# Patient Record
Sex: FEMALE | Race: WHITE | Employment: OTHER | URBAN - METROPOLITAN AREA
[De-identification: names, ages, dates, MRNs, and addresses within clinical notes are randomized per-mention and may not be internally consistent; named-entity substitution may affect disease eponyms.]

---

## 2017-09-23 PROBLEM — R07.9 CHEST PAIN: Status: ACTIVE | Noted: 2017-09-23

## 2017-09-25 PROBLEM — R07.2 PRECORDIAL PAIN: Status: ACTIVE | Noted: 2017-09-23

## 2018-05-02 ENCOUNTER — HOSPITAL ENCOUNTER (OUTPATIENT)
Age: 83
Setting detail: OBSERVATION
Discharge: HOME | End: 2018-05-04
Attending: EMERGENCY MEDICINE | Admitting: INTERNAL MEDICINE
Payer: MEDICARE

## 2018-05-02 ENCOUNTER — APPOINTMENT (OUTPATIENT)
Dept: CT IMAGING | Age: 83
End: 2018-05-02
Payer: MEDICARE

## 2018-05-02 ENCOUNTER — APPOINTMENT (OUTPATIENT)
Dept: GENERAL RADIOLOGY | Age: 83
End: 2018-05-02
Payer: MEDICARE

## 2018-05-02 DIAGNOSIS — R29.90 STROKE-LIKE SYMPTOMS: ICD-10-CM

## 2018-05-02 DIAGNOSIS — H81.4 VERTIGO OF CENTRAL ORIGIN, UNSPECIFIED LATERALITY: Primary | ICD-10-CM

## 2018-05-02 LAB
ANION GAP SERPL CALCULATED.3IONS-SCNC: 13 MMOL/L (ref 7–16)
BASOPHILS ABSOLUTE: 0.05 E9/L (ref 0–0.2)
BASOPHILS RELATIVE PERCENT: 1 % (ref 0–2)
BUN BLDV-MCNC: 15 MG/DL (ref 8–23)
CALCIUM SERPL-MCNC: 9.5 MG/DL (ref 8.6–10.2)
CHLORIDE BLD-SCNC: 99 MMOL/L (ref 98–107)
CO2: 27 MMOL/L (ref 22–29)
CREAT SERPL-MCNC: 0.9 MG/DL (ref 0.5–1)
EOSINOPHILS ABSOLUTE: 0.11 E9/L (ref 0.05–0.5)
EOSINOPHILS RELATIVE PERCENT: 2.1 % (ref 0–6)
GFR AFRICAN AMERICAN: >60
GFR NON-AFRICAN AMERICAN: 59 ML/MIN/1.73
GLUCOSE BLD-MCNC: 105 MG/DL (ref 74–109)
HCT VFR BLD CALC: 40.6 % (ref 34–48)
HEMOGLOBIN: 13.4 G/DL (ref 11.5–15.5)
IMMATURE GRANULOCYTES #: 0.01 E9/L
IMMATURE GRANULOCYTES %: 0.2 % (ref 0–5)
LYMPHOCYTES ABSOLUTE: 1.39 E9/L (ref 1.5–4)
LYMPHOCYTES RELATIVE PERCENT: 26.6 % (ref 20–42)
MCH RBC QN AUTO: 30.5 PG (ref 26–35)
MCHC RBC AUTO-ENTMCNC: 33 % (ref 32–34.5)
MCV RBC AUTO: 92.5 FL (ref 80–99.9)
METER GLUCOSE: 95 MG/DL (ref 70–110)
MONOCYTES ABSOLUTE: 0.34 E9/L (ref 0.1–0.95)
MONOCYTES RELATIVE PERCENT: 6.5 % (ref 2–12)
NEUTROPHILS ABSOLUTE: 3.32 E9/L (ref 1.8–7.3)
NEUTROPHILS RELATIVE PERCENT: 63.6 % (ref 43–80)
PDW BLD-RTO: 12.8 FL (ref 11.5–15)
PLATELET # BLD: 201 E9/L (ref 130–450)
PMV BLD AUTO: 9.6 FL (ref 7–12)
POTASSIUM SERPL-SCNC: 5 MMOL/L (ref 3.5–5)
RBC # BLD: 4.39 E12/L (ref 3.5–5.5)
SODIUM BLD-SCNC: 139 MMOL/L (ref 132–146)
TROPONIN: <0.01 NG/ML (ref 0–0.03)
WBC # BLD: 5.2 E9/L (ref 4.5–11.5)

## 2018-05-02 PROCEDURE — 93005 ELECTROCARDIOGRAM TRACING: CPT

## 2018-05-02 PROCEDURE — 85025 COMPLETE CBC W/AUTO DIFF WBC: CPT

## 2018-05-02 PROCEDURE — 2580000003 HC RX 258: Performed by: INTERNAL MEDICINE

## 2018-05-02 PROCEDURE — 36415 COLL VENOUS BLD VENIPUNCTURE: CPT

## 2018-05-02 PROCEDURE — 6370000000 HC RX 637 (ALT 250 FOR IP): Performed by: EMERGENCY MEDICINE

## 2018-05-02 PROCEDURE — G0378 HOSPITAL OBSERVATION PER HR: HCPCS

## 2018-05-02 PROCEDURE — 82962 GLUCOSE BLOOD TEST: CPT

## 2018-05-02 PROCEDURE — 6360000002 HC RX W HCPCS: Performed by: INTERNAL MEDICINE

## 2018-05-02 PROCEDURE — 96372 THER/PROPH/DIAG INJ SC/IM: CPT

## 2018-05-02 PROCEDURE — 84484 ASSAY OF TROPONIN QUANT: CPT

## 2018-05-02 PROCEDURE — 71045 X-RAY EXAM CHEST 1 VIEW: CPT

## 2018-05-02 PROCEDURE — 80048 BASIC METABOLIC PNL TOTAL CA: CPT

## 2018-05-02 PROCEDURE — 70450 CT HEAD/BRAIN W/O DYE: CPT

## 2018-05-02 PROCEDURE — 99285 EMERGENCY DEPT VISIT HI MDM: CPT

## 2018-05-02 RX ORDER — ACETAMINOPHEN 325 MG/1
650 TABLET ORAL EVERY 4 HOURS PRN
Status: DISCONTINUED | OUTPATIENT
Start: 2018-05-02 | End: 2018-05-04 | Stop reason: HOSPADM

## 2018-05-02 RX ORDER — SODIUM CHLORIDE 0.9 % (FLUSH) 0.9 %
10 SYRINGE (ML) INJECTION PRN
Status: DISCONTINUED | OUTPATIENT
Start: 2018-05-02 | End: 2018-05-04 | Stop reason: HOSPADM

## 2018-05-02 RX ORDER — LEVOTHYROXINE SODIUM 0.05 MG/1
50 TABLET ORAL DAILY
Status: DISCONTINUED | OUTPATIENT
Start: 2018-05-03 | End: 2018-05-04 | Stop reason: HOSPADM

## 2018-05-02 RX ORDER — SODIUM CHLORIDE 0.9 % (FLUSH) 0.9 %
10 SYRINGE (ML) INJECTION EVERY 12 HOURS SCHEDULED
Status: DISCONTINUED | OUTPATIENT
Start: 2018-05-02 | End: 2018-05-04 | Stop reason: HOSPADM

## 2018-05-02 RX ORDER — ALBUTEROL SULFATE 90 UG/1
2 AEROSOL, METERED RESPIRATORY (INHALATION) EVERY 6 HOURS PRN
Status: DISCONTINUED | OUTPATIENT
Start: 2018-05-02 | End: 2018-05-04 | Stop reason: HOSPADM

## 2018-05-02 RX ORDER — TRAMADOL HYDROCHLORIDE 50 MG/1
50 TABLET ORAL EVERY 6 HOURS PRN
Status: DISCONTINUED | OUTPATIENT
Start: 2018-05-02 | End: 2018-05-04 | Stop reason: HOSPADM

## 2018-05-02 RX ORDER — POLYETHYLENE GLYCOL 3350 17 G/17G
17 POWDER, FOR SOLUTION ORAL DAILY PRN
Status: ON HOLD | COMMUNITY
End: 2019-09-10 | Stop reason: HOSPADM

## 2018-05-02 RX ORDER — DEXLANSOPRAZOLE 60 MG/1
60 CAPSULE, DELAYED RELEASE ORAL DAILY
Status: DISCONTINUED | OUTPATIENT
Start: 2018-05-02 | End: 2018-05-04 | Stop reason: HOSPADM

## 2018-05-02 RX ORDER — ONDANSETRON 2 MG/ML
4 INJECTION INTRAMUSCULAR; INTRAVENOUS EVERY 6 HOURS PRN
Status: DISCONTINUED | OUTPATIENT
Start: 2018-05-02 | End: 2018-05-04 | Stop reason: HOSPADM

## 2018-05-02 RX ORDER — ASPIRIN 81 MG/1
81 TABLET ORAL DAILY
Status: DISCONTINUED | OUTPATIENT
Start: 2018-05-03 | End: 2018-05-04 | Stop reason: HOSPADM

## 2018-05-02 RX ORDER — ASPIRIN 81 MG/1
324 TABLET, CHEWABLE ORAL ONCE
Status: COMPLETED | OUTPATIENT
Start: 2018-05-02 | End: 2018-05-02

## 2018-05-02 RX ORDER — SODIUM CHLORIDE 9 MG/ML
INJECTION, SOLUTION INTRAVENOUS CONTINUOUS
Status: DISCONTINUED | OUTPATIENT
Start: 2018-05-02 | End: 2018-05-04

## 2018-05-02 RX ADMIN — Medication 10 ML: at 20:22

## 2018-05-02 RX ADMIN — ENOXAPARIN SODIUM 40 MG: 40 INJECTION SUBCUTANEOUS at 21:10

## 2018-05-02 RX ADMIN — ASPIRIN 81 MG 324 MG: 81 TABLET ORAL at 17:13

## 2018-05-02 RX ADMIN — SODIUM CHLORIDE: 9 INJECTION, SOLUTION INTRAVENOUS at 20:16

## 2018-05-02 ASSESSMENT — ENCOUNTER SYMPTOMS
EYE DISCHARGE: 0
SINUS PRESSURE: 1
DIARRHEA: 0
EYE PAIN: 0
BACK PAIN: 0
STRIDOR: 0
SORE THROAT: 0
SHORTNESS OF BREATH: 0
RHINORRHEA: 0
COUGH: 0
BLOOD IN STOOL: 0
NAUSEA: 0
ABDOMINAL PAIN: 0
VOMITING: 0
COLOR CHANGE: 0
CHEST TIGHTNESS: 0
VOICE CHANGE: 0

## 2018-05-02 ASSESSMENT — PAIN SCALES - GENERAL
PAINLEVEL_OUTOF10: 0
PAINLEVEL_OUTOF10: 0

## 2018-05-03 ENCOUNTER — APPOINTMENT (OUTPATIENT)
Dept: MRI IMAGING | Age: 83
End: 2018-05-03
Payer: MEDICARE

## 2018-05-03 PROBLEM — R07.2 PRECORDIAL PAIN: Status: RESOLVED | Noted: 2017-09-23 | Resolved: 2018-05-03

## 2018-05-03 PROBLEM — G70.00 OCULAR MYASTHENIA GRAVIS (HCC): Status: ACTIVE | Noted: 2018-05-03

## 2018-05-03 PROBLEM — H53.2 DIPLOPIA: Status: ACTIVE | Noted: 2018-05-03

## 2018-05-03 PROCEDURE — 96374 THER/PROPH/DIAG INJ IV PUSH: CPT

## 2018-05-03 PROCEDURE — 97165 OT EVAL LOW COMPLEX 30 MIN: CPT

## 2018-05-03 PROCEDURE — 6360000002 HC RX W HCPCS

## 2018-05-03 PROCEDURE — G8987 SELF CARE CURRENT STATUS: HCPCS

## 2018-05-03 PROCEDURE — G0378 HOSPITAL OBSERVATION PER HR: HCPCS

## 2018-05-03 PROCEDURE — 70544 MR ANGIOGRAPHY HEAD W/O DYE: CPT

## 2018-05-03 PROCEDURE — 2580000003 HC RX 258: Performed by: INTERNAL MEDICINE

## 2018-05-03 PROCEDURE — 83519 RIA NONANTIBODY: CPT

## 2018-05-03 PROCEDURE — G9159 LANG COMP CURRENT STATUS: HCPCS

## 2018-05-03 PROCEDURE — 70547 MR ANGIOGRAPHY NECK W/O DYE: CPT

## 2018-05-03 PROCEDURE — G8979 MOBILITY GOAL STATUS: HCPCS | Performed by: PHYSICAL THERAPIST

## 2018-05-03 PROCEDURE — 97161 PT EVAL LOW COMPLEX 20 MIN: CPT | Performed by: PHYSICAL THERAPIST

## 2018-05-03 PROCEDURE — G8978 MOBILITY CURRENT STATUS: HCPCS | Performed by: PHYSICAL THERAPIST

## 2018-05-03 PROCEDURE — 92523 SPEECH SOUND LANG COMPREHEN: CPT

## 2018-05-03 PROCEDURE — G9161 LANG COMP D/C STATUS: HCPCS

## 2018-05-03 PROCEDURE — 70551 MRI BRAIN STEM W/O DYE: CPT

## 2018-05-03 PROCEDURE — 99221 1ST HOSP IP/OBS SF/LOW 40: CPT | Performed by: OTOLARYNGOLOGY

## 2018-05-03 PROCEDURE — 6370000000 HC RX 637 (ALT 250 FOR IP): Performed by: INTERNAL MEDICINE

## 2018-05-03 PROCEDURE — G9160 LANG COMP GOAL STATUS: HCPCS

## 2018-05-03 PROCEDURE — 96372 THER/PROPH/DIAG INJ SC/IM: CPT

## 2018-05-03 PROCEDURE — G8988 SELF CARE GOAL STATUS: HCPCS

## 2018-05-03 PROCEDURE — 6360000002 HC RX W HCPCS: Performed by: INTERNAL MEDICINE

## 2018-05-03 PROCEDURE — 83516 IMMUNOASSAY NONANTIBODY: CPT

## 2018-05-03 RX ORDER — LORAZEPAM 2 MG/ML
INJECTION INTRAMUSCULAR
Status: COMPLETED
Start: 2018-05-03 | End: 2018-05-03

## 2018-05-03 RX ORDER — LORAZEPAM 2 MG/ML
1 INJECTION INTRAMUSCULAR ONCE
Status: COMPLETED | OUTPATIENT
Start: 2018-05-03 | End: 2018-05-03

## 2018-05-03 RX ADMIN — BECLOMETHASONE DIPROPIONATE 2 PUFF: 80 AEROSOL, METERED RESPIRATORY (INHALATION) at 21:05

## 2018-05-03 RX ADMIN — ASPIRIN 81 MG: 81 TABLET, COATED ORAL at 08:40

## 2018-05-03 RX ADMIN — ACETAMINOPHEN 650 MG: 325 TABLET, FILM COATED ORAL at 21:06

## 2018-05-03 RX ADMIN — BECLOMETHASONE DIPROPIONATE 2 PUFF: 80 AEROSOL, METERED RESPIRATORY (INHALATION) at 08:41

## 2018-05-03 RX ADMIN — Medication 10 ML: at 08:44

## 2018-05-03 RX ADMIN — Medication 10 ML: at 21:07

## 2018-05-03 RX ADMIN — LEVOTHYROXINE SODIUM 50 MCG: 50 TABLET ORAL at 08:40

## 2018-05-03 RX ADMIN — LORAZEPAM 1 MG: 2 INJECTION INTRAMUSCULAR; INTRAVENOUS at 06:07

## 2018-05-03 RX ADMIN — LORAZEPAM 1 MG: 2 INJECTION INTRAMUSCULAR at 06:07

## 2018-05-03 RX ADMIN — ENOXAPARIN SODIUM 40 MG: 40 INJECTION SUBCUTANEOUS at 08:40

## 2018-05-03 ASSESSMENT — PAIN SCALES - GENERAL
PAINLEVEL_OUTOF10: 0
PAINLEVEL_OUTOF10: 0
PAINLEVEL_OUTOF10: 5
PAINLEVEL_OUTOF10: 0
PAINLEVEL_OUTOF10: 0

## 2018-05-04 VITALS
BODY MASS INDEX: 23.6 KG/M2 | DIASTOLIC BLOOD PRESSURE: 64 MMHG | HEIGHT: 61 IN | HEART RATE: 74 BPM | RESPIRATION RATE: 16 BRPM | OXYGEN SATURATION: 95 % | TEMPERATURE: 99.1 F | WEIGHT: 125 LBS | SYSTOLIC BLOOD PRESSURE: 135 MMHG

## 2018-05-04 PROBLEM — D32.9 MENINGIOMA (HCC): Status: ACTIVE | Noted: 2018-05-04

## 2018-05-04 LAB
BACTERIA: ABNORMAL /HPF
BILIRUBIN URINE: NEGATIVE
BLOOD, URINE: ABNORMAL
CLARITY: CLEAR
COLOR: YELLOW
EPITHELIAL CELLS, UA: ABNORMAL /HPF
GLUCOSE URINE: NEGATIVE MG/DL
KETONES, URINE: NEGATIVE MG/DL
LEUKOCYTE ESTERASE, URINE: ABNORMAL
NITRITE, URINE: NEGATIVE
PH UA: 7 (ref 5–9)
PROTEIN UA: NEGATIVE MG/DL
RBC UA: ABNORMAL /HPF (ref 0–2)
SPECIFIC GRAVITY UA: 1.01 (ref 1–1.03)
UROBILINOGEN, URINE: 0.2 E.U./DL
WBC UA: ABNORMAL /HPF (ref 0–5)

## 2018-05-04 PROCEDURE — 6370000000 HC RX 637 (ALT 250 FOR IP): Performed by: INTERNAL MEDICINE

## 2018-05-04 PROCEDURE — 81001 URINALYSIS AUTO W/SCOPE: CPT

## 2018-05-04 PROCEDURE — 96372 THER/PROPH/DIAG INJ SC/IM: CPT

## 2018-05-04 PROCEDURE — 87088 URINE BACTERIA CULTURE: CPT

## 2018-05-04 PROCEDURE — 99219 PR INITIAL OBSERVATION CARE/DAY 50 MINUTES: CPT | Performed by: NEUROLOGICAL SURGERY

## 2018-05-04 PROCEDURE — G0378 HOSPITAL OBSERVATION PER HR: HCPCS

## 2018-05-04 PROCEDURE — 6360000002 HC RX W HCPCS: Performed by: INTERNAL MEDICINE

## 2018-05-04 RX ADMIN — ENOXAPARIN SODIUM 40 MG: 40 INJECTION SUBCUTANEOUS at 08:50

## 2018-05-04 RX ADMIN — ASPIRIN 81 MG: 81 TABLET, COATED ORAL at 08:50

## 2018-05-04 RX ADMIN — LEVOTHYROXINE SODIUM 50 MCG: 50 TABLET ORAL at 06:58

## 2018-05-04 RX ADMIN — BECLOMETHASONE DIPROPIONATE 2 PUFF: 80 AEROSOL, METERED RESPIRATORY (INHALATION) at 08:50

## 2018-05-04 ASSESSMENT — PAIN SCALES - GENERAL: PAINLEVEL_OUTOF10: 0

## 2018-05-06 LAB
ACETYLCHOLINE BINDING ANTIBODY: 0 NMOL/L (ref 0–0.4)
ACETYLCHOLINE BLOCKING AB: 14 % (ref 0–26)
URINE CULTURE, ROUTINE: NORMAL

## 2018-05-09 LAB
EKG ATRIAL RATE: 75 BPM
EKG P AXIS: 68 DEGREES
EKG P-R INTERVAL: 166 MS
EKG Q-T INTERVAL: 372 MS
EKG QRS DURATION: 80 MS
EKG QTC CALCULATION (BAZETT): 415 MS
EKG R AXIS: 17 DEGREES
EKG T AXIS: 71 DEGREES
EKG VENTRICULAR RATE: 75 BPM

## 2018-05-18 ENCOUNTER — TELEPHONE (OUTPATIENT)
Dept: CARDIOLOGY CLINIC | Age: 83
End: 2018-05-18

## 2018-06-01 ENCOUNTER — HOSPITAL ENCOUNTER (OUTPATIENT)
Dept: CARDIOLOGY | Age: 83
Discharge: HOME | End: 2018-06-01
Payer: MEDICARE

## 2018-06-01 DIAGNOSIS — G45.9 TRANSIENT CEREBRAL ISCHEMIA, UNSPECIFIED TYPE: Primary | ICD-10-CM

## 2018-06-01 LAB
LEFT VENTRICULAR EJECTION FRACTION MODE: NORMAL
LV EF: 65 %
LV EF: 65 %
LVEF MODALITY: NORMAL

## 2018-06-01 PROCEDURE — 2580000003 HC RX 258: Performed by: INTERNAL MEDICINE

## 2018-06-01 PROCEDURE — 93306 TTE W/DOPPLER COMPLETE: CPT | Performed by: PSYCHIATRY & NEUROLOGY

## 2018-06-01 RX ORDER — SODIUM CHLORIDE 0.9 % (FLUSH) 0.9 %
10 SYRINGE (ML) INJECTION PRN
Status: DISCONTINUED | OUTPATIENT
Start: 2018-06-01 | End: 2018-06-02 | Stop reason: HOSPADM

## 2018-06-01 RX ADMIN — Medication 10 ML: at 11:15

## 2018-06-04 ENCOUNTER — TELEPHONE (OUTPATIENT)
Dept: CARDIOLOGY CLINIC | Age: 83
End: 2018-06-04

## 2018-06-26 ENCOUNTER — OFFICE VISIT (OUTPATIENT)
Dept: CARDIOLOGY CLINIC | Age: 83
End: 2018-06-26
Payer: MEDICARE

## 2018-06-26 VITALS
DIASTOLIC BLOOD PRESSURE: 78 MMHG | BODY MASS INDEX: 23.92 KG/M2 | HEIGHT: 61 IN | HEART RATE: 65 BPM | SYSTOLIC BLOOD PRESSURE: 130 MMHG | WEIGHT: 126.7 LBS | RESPIRATION RATE: 16 BRPM

## 2018-06-26 DIAGNOSIS — R42 VERTIGO: ICD-10-CM

## 2018-06-26 DIAGNOSIS — I51.89 DIASTOLIC DYSFUNCTION: Primary | ICD-10-CM

## 2018-06-26 DIAGNOSIS — I10 ESSENTIAL HYPERTENSION: ICD-10-CM

## 2018-06-26 DIAGNOSIS — J44.9 CHRONIC OBSTRUCTIVE PULMONARY DISEASE, UNSPECIFIED COPD TYPE (HCC): Chronic | ICD-10-CM

## 2018-06-26 DIAGNOSIS — H53.2 DIPLOPIA: ICD-10-CM

## 2018-06-26 PROCEDURE — 99213 OFFICE O/P EST LOW 20 MIN: CPT | Performed by: PHYSICIAN ASSISTANT

## 2018-06-26 PROCEDURE — 93000 ELECTROCARDIOGRAM COMPLETE: CPT | Performed by: INTERNAL MEDICINE

## 2018-06-26 PROCEDURE — 1123F ACP DISCUSS/DSCN MKR DOCD: CPT | Performed by: PHYSICIAN ASSISTANT

## 2018-06-26 PROCEDURE — 1090F PRES/ABSN URINE INCON ASSESS: CPT | Performed by: PHYSICIAN ASSISTANT

## 2018-06-26 PROCEDURE — G8427 DOCREV CUR MEDS BY ELIG CLIN: HCPCS | Performed by: PHYSICIAN ASSISTANT

## 2018-06-26 PROCEDURE — 3023F SPIROM DOC REV: CPT | Performed by: PHYSICIAN ASSISTANT

## 2018-06-26 PROCEDURE — G8420 CALC BMI NORM PARAMETERS: HCPCS | Performed by: PHYSICIAN ASSISTANT

## 2018-06-26 PROCEDURE — 4040F PNEUMOC VAC/ADMIN/RCVD: CPT | Performed by: PHYSICIAN ASSISTANT

## 2018-06-26 PROCEDURE — G8926 SPIRO NO PERF OR DOC: HCPCS | Performed by: PHYSICIAN ASSISTANT

## 2018-06-26 PROCEDURE — 1036F TOBACCO NON-USER: CPT | Performed by: PHYSICIAN ASSISTANT

## 2018-07-28 ENCOUNTER — HOSPITAL ENCOUNTER (INPATIENT)
Age: 83
LOS: 5 days | Discharge: HOME | DRG: 479 | End: 2018-08-02
Attending: EMERGENCY MEDICINE | Admitting: INTERNAL MEDICINE
Payer: MEDICARE

## 2018-07-28 ENCOUNTER — APPOINTMENT (OUTPATIENT)
Dept: GENERAL RADIOLOGY | Age: 83
DRG: 479 | End: 2018-07-28
Payer: MEDICARE

## 2018-07-28 DIAGNOSIS — R52 INTRACTABLE PAIN: ICD-10-CM

## 2018-07-28 DIAGNOSIS — M54.50 ACUTE MIDLINE LOW BACK PAIN WITHOUT SCIATICA: Primary | ICD-10-CM

## 2018-07-28 DIAGNOSIS — S22.080A CLOSED WEDGE COMPRESSION FRACTURE OF ELEVENTH THORACIC VERTEBRA, INITIAL ENCOUNTER: ICD-10-CM

## 2018-07-28 PROBLEM — H53.2 DIPLOPIA: Status: RESOLVED | Noted: 2018-05-03 | Resolved: 2018-07-28

## 2018-07-28 PROBLEM — E03.9 ACQUIRED HYPOTHYROIDISM: Chronic | Status: ACTIVE | Noted: 2018-07-28

## 2018-07-28 PROBLEM — M54.9 BACK PAIN: Status: ACTIVE | Noted: 2018-07-28

## 2018-07-28 PROBLEM — R29.90 STROKE-LIKE SYMPTOMS: Status: RESOLVED | Noted: 2018-05-02 | Resolved: 2018-07-28

## 2018-07-28 PROCEDURE — 2580000003 HC RX 258: Performed by: INTERNAL MEDICINE

## 2018-07-28 PROCEDURE — 6360000002 HC RX W HCPCS: Performed by: STUDENT IN AN ORGANIZED HEALTH CARE EDUCATION/TRAINING PROGRAM

## 2018-07-28 PROCEDURE — 6360000002 HC RX W HCPCS: Performed by: EMERGENCY MEDICINE

## 2018-07-28 PROCEDURE — 6360000002 HC RX W HCPCS: Performed by: INTERNAL MEDICINE

## 2018-07-28 PROCEDURE — 96374 THER/PROPH/DIAG INJ IV PUSH: CPT

## 2018-07-28 PROCEDURE — 1200000000 HC SEMI PRIVATE

## 2018-07-28 PROCEDURE — 72100 X-RAY EXAM L-S SPINE 2/3 VWS: CPT

## 2018-07-28 PROCEDURE — 72072 X-RAY EXAM THORAC SPINE 3VWS: CPT

## 2018-07-28 PROCEDURE — 6370000000 HC RX 637 (ALT 250 FOR IP): Performed by: INTERNAL MEDICINE

## 2018-07-28 PROCEDURE — 99284 EMERGENCY DEPT VISIT MOD MDM: CPT

## 2018-07-28 RX ORDER — MORPHINE SULFATE 4 MG/ML
4 INJECTION, SOLUTION INTRAMUSCULAR; INTRAVENOUS ONCE
Status: COMPLETED | OUTPATIENT
Start: 2018-07-28 | End: 2018-07-28

## 2018-07-28 RX ORDER — TRAMADOL HYDROCHLORIDE 50 MG/1
100 TABLET ORAL EVERY 8 HOURS PRN
Status: DISCONTINUED | OUTPATIENT
Start: 2018-07-28 | End: 2018-08-02 | Stop reason: HOSPADM

## 2018-07-28 RX ORDER — CELECOXIB 100 MG/1
200 CAPSULE ORAL 2 TIMES DAILY
Status: DISCONTINUED | OUTPATIENT
Start: 2018-07-28 | End: 2018-08-02 | Stop reason: HOSPADM

## 2018-07-28 RX ORDER — ONDANSETRON 2 MG/ML
4 INJECTION INTRAMUSCULAR; INTRAVENOUS EVERY 6 HOURS PRN
Status: DISCONTINUED | OUTPATIENT
Start: 2018-07-28 | End: 2018-08-02 | Stop reason: HOSPADM

## 2018-07-28 RX ORDER — LEVOTHYROXINE SODIUM 0.05 MG/1
50 TABLET ORAL DAILY
Status: DISCONTINUED | OUTPATIENT
Start: 2018-07-29 | End: 2018-08-02 | Stop reason: HOSPADM

## 2018-07-28 RX ORDER — TRAMADOL HYDROCHLORIDE 50 MG/1
50 TABLET ORAL EVERY 6 HOURS PRN
Status: DISCONTINUED | OUTPATIENT
Start: 2018-07-28 | End: 2018-08-02 | Stop reason: HOSPADM

## 2018-07-28 RX ORDER — OXYCODONE HYDROCHLORIDE AND ACETAMINOPHEN 5; 325 MG/1; MG/1
1 TABLET ORAL EVERY 4 HOURS PRN
Status: DISCONTINUED | OUTPATIENT
Start: 2018-07-28 | End: 2018-07-28

## 2018-07-28 RX ORDER — OXYCODONE HYDROCHLORIDE AND ACETAMINOPHEN 5; 325 MG/1; MG/1
2 TABLET ORAL EVERY 4 HOURS PRN
Status: DISCONTINUED | OUTPATIENT
Start: 2018-07-28 | End: 2018-07-28

## 2018-07-28 RX ORDER — SODIUM CHLORIDE 0.9 % (FLUSH) 0.9 %
10 SYRINGE (ML) INJECTION EVERY 12 HOURS SCHEDULED
Status: DISCONTINUED | OUTPATIENT
Start: 2018-07-28 | End: 2018-08-02 | Stop reason: HOSPADM

## 2018-07-28 RX ORDER — FUROSEMIDE 10 MG/ML
80 INJECTION INTRAMUSCULAR; INTRAVENOUS ONCE
Status: DISCONTINUED | OUTPATIENT
Start: 2018-07-28 | End: 2018-07-28

## 2018-07-28 RX ORDER — TRAMADOL HYDROCHLORIDE 50 MG/1
50 TABLET ORAL EVERY 6 HOURS PRN
Status: DISCONTINUED | OUTPATIENT
Start: 2018-07-28 | End: 2018-07-28

## 2018-07-28 RX ORDER — DEXLANSOPRAZOLE 60 MG/1
60 CAPSULE, DELAYED RELEASE ORAL
Status: DISCONTINUED | OUTPATIENT
Start: 2018-07-29 | End: 2018-08-02 | Stop reason: HOSPADM

## 2018-07-28 RX ORDER — ASPIRIN 81 MG/1
81 TABLET ORAL DAILY
Status: DISCONTINUED | OUTPATIENT
Start: 2018-07-29 | End: 2018-07-30

## 2018-07-28 RX ORDER — SUMATRIPTAN 50 MG/1
100 TABLET, FILM COATED ORAL DAILY PRN
Status: DISCONTINUED | OUTPATIENT
Start: 2018-07-28 | End: 2018-08-02 | Stop reason: HOSPADM

## 2018-07-28 RX ORDER — MORPHINE SULFATE 4 MG/ML
4 INJECTION, SOLUTION INTRAMUSCULAR; INTRAVENOUS EVERY 4 HOURS PRN
Status: DISCONTINUED | OUTPATIENT
Start: 2018-07-28 | End: 2018-07-29

## 2018-07-28 RX ORDER — POLYETHYLENE GLYCOL 3350 17 G/17G
17 POWDER, FOR SOLUTION ORAL DAILY
Status: DISCONTINUED | OUTPATIENT
Start: 2018-07-29 | End: 2018-08-02 | Stop reason: HOSPADM

## 2018-07-28 RX ORDER — MORPHINE SULFATE 2 MG/ML
2 INJECTION, SOLUTION INTRAMUSCULAR; INTRAVENOUS EVERY 4 HOURS PRN
Status: DISCONTINUED | OUTPATIENT
Start: 2018-07-28 | End: 2018-07-29

## 2018-07-28 RX ORDER — SODIUM CHLORIDE 0.9 % (FLUSH) 0.9 %
10 SYRINGE (ML) INJECTION PRN
Status: DISCONTINUED | OUTPATIENT
Start: 2018-07-28 | End: 2018-08-02 | Stop reason: HOSPADM

## 2018-07-28 RX ORDER — ANTIOX #8/OM3/DHA/EPA/LUT/ZEAX 250-2.5 MG
CAPSULE ORAL
Status: ON HOLD | COMMUNITY
End: 2018-08-02 | Stop reason: HOSPADM

## 2018-07-28 RX ADMIN — MORPHINE SULFATE 4 MG: 4 INJECTION, SOLUTION INTRAMUSCULAR; INTRAVENOUS at 10:03

## 2018-07-28 RX ADMIN — CELECOXIB 200 MG: 100 CAPSULE ORAL at 21:44

## 2018-07-28 RX ADMIN — MORPHINE SULFATE 4 MG: 4 INJECTION INTRAVENOUS at 18:34

## 2018-07-28 RX ADMIN — Medication 10 ML: at 21:44

## 2018-07-28 RX ADMIN — ENOXAPARIN SODIUM 40 MG: 100 INJECTION SUBCUTANEOUS at 18:35

## 2018-07-28 RX ADMIN — MORPHINE SULFATE 4 MG: 4 INJECTION INTRAVENOUS at 23:22

## 2018-07-28 RX ADMIN — BECLOMETHASONE DIPROPIONATE 2 PUFF: 80 AEROSOL, METERED RESPIRATORY (INHALATION) at 21:44

## 2018-07-28 ASSESSMENT — PAIN DESCRIPTION - ORIENTATION
ORIENTATION: LOWER

## 2018-07-28 ASSESSMENT — PAIN SCALES - GENERAL
PAINLEVEL_OUTOF10: 3
PAINLEVEL_OUTOF10: 10
PAINLEVEL_OUTOF10: 9
PAINLEVEL_OUTOF10: 10
PAINLEVEL_OUTOF10: 7
PAINLEVEL_OUTOF10: 4
PAINLEVEL_OUTOF10: 6
PAINLEVEL_OUTOF10: 10

## 2018-07-28 ASSESSMENT — PAIN DESCRIPTION - LOCATION
LOCATION: BACK

## 2018-07-28 ASSESSMENT — ENCOUNTER SYMPTOMS
COUGH: 0
SHORTNESS OF BREATH: 0
NAUSEA: 0
VOMITING: 0
ABDOMINAL PAIN: 0
BLOOD IN STOOL: 0
BACK PAIN: 1

## 2018-07-28 ASSESSMENT — PAIN DESCRIPTION - DESCRIPTORS
DESCRIPTORS: ACHING;DISCOMFORT
DESCRIPTORS: ACHING
DESCRIPTORS: ACHING;SORE;CONSTANT

## 2018-07-28 ASSESSMENT — PAIN DESCRIPTION - FREQUENCY: FREQUENCY: CONTINUOUS

## 2018-07-28 ASSESSMENT — PAIN DESCRIPTION - ONSET: ONSET: ON-GOING

## 2018-07-28 ASSESSMENT — PAIN DESCRIPTION - PAIN TYPE
TYPE: ACUTE PAIN

## 2018-07-28 NOTE — ED PROVIDER NOTES
Patient is a 51-year-old female presenting to ED with a complaint of back pain. Patient denies any injury has been ambulatory states that for the past 3 days she's had pain she followed up yesterday with the orthopedic urgent care. Patient saw her surgeon Dr. Kash STOKES. She is advised to have an outpatient MRI. Patient was then discharged home. Patient states she woke up today and states she cannot handle the severity of the pain. Patient denies any urinary incontinence or retention, numbness, and Lipitor issue. Patient states when she changes position or moves the pain is severe and is located in the lumbar spine on both sides of the spinous process, she denies any lesion or pain. He states that she's felt nauseous after taking tramadol for the pain and only had minimal relief with tramadol. Patient denies fever recent injury states that this occurred while she was walking around her house but denies any injury or history of carrying or moving heavy objects recently. Review of Systems   Constitutional: Negative for chills and fever. Respiratory: Negative for cough and shortness of breath. Cardiovascular: Negative for chest pain. Gastrointestinal: Negative for abdominal pain, blood in stool, nausea and vomiting. Genitourinary: Negative for dysuria and frequency. Musculoskeletal: Positive for arthralgias and back pain. Negative for gait problem, neck pain and neck stiffness. Skin: Negative for rash. Neurological: Negative for weakness and headaches. All other systems reviewed and are negative. Physical Exam   Constitutional: She is oriented to person, place, and time. She appears well-developed and well-nourished. No distress. HENT:   Head: Normocephalic and atraumatic. Eyes: Conjunctivae are normal.   Neck: Normal range of motion. Neck supple. Cardiovascular: Normal rate, regular rhythm and normal heart sounds. No murmur heard.   Pulmonary/Chest: Effort normal and breath sounds normal. No respiratory distress. She has no wheezes. She has no rales. Abdominal: Soft. Bowel sounds are normal. There is no tenderness. There is no rebound and no guarding. Musculoskeletal: She exhibits tenderness. She exhibits no edema. Lumbar spine tenderness present at level of L3-L4 bilaterally. no spinous process tenderness. Neurological: She is alert and oriented to person, place, and time. No cranial nerve deficit. Coordination normal.   Skin: Skin is warm and dry. She is not diaphoretic. Nursing note and vitals reviewed. Procedures    MDM  Number of Diagnoses or Management Options  Diagnosis management comments: Value inpatient for back pain was advised by her surgeon have an MRI. Spoke with patient about imaging ED. Patient does not have any symptoms concerning for cauda equina or epidural abscess patient will be unable to get an MRI in ED setting. We will treat patient's pain      11:00 treated back pain with morphine without relief. We will consults orthopedic surgery and admit to medicine. 11:25 spoke with Dr. Freddy Vargas. He will admit patient.    --------------------------------------------- PAST HISTORY ---------------------------------------------  Past Medical History:  has a past medical history of Acid reflux disease; Arthritis; Chronic back pain; COPD (chronic obstructive pulmonary disease) ([facility] Hearing loss; Hyperlipidemia; Irritable bowel; Migraine; Neuropathy ([facility] [facility] Osteoarthritis; Osteoporosis; and Thyroid disease. Past Surgical History:  has a past surgical history that includes Hysterectomy; Tonsillectomy; Appendectomy; Thyroid surgery; Endoscopy, colon, diagnostic (8/2012); ERCP (10/31/2012); ERCP (12/05/2012); fracture surgery; eye surgery; Cholecystectomy; Fixation Kyphoplasty (08/03/2016); Breast surgery; Colonoscopy; blepharoplasty; Ankle surgery (1980); and Breast biopsy (1978). Social History:  reports that she quit smoking about 22 years ago.  She has never used smokeless tobacco. She reports that she does not drink alcohol or use drugs. Family History: family history is not on file. The patients home medications have been reviewed. Allergies: Amlodipine besylate; Bentyl [dicyclomine hcl]; Codeine; Mobic [meloxicam];  Other; Pantoprazole; Prednisone; and Talwin [pentazocine]    -------------------------------------------------- RESULTS -------------------------------------------------    LABS:  Results for orders placed or performed during the hospital encounter of 18   CBC Auto Differential   Result Value Ref Range    WBC 5.6 4.5 - 11.5 E9/L    RBC 3.59 3.50 - 5.50 E12/L    Hemoglobin 10.7 (L) 11.5 - 15.5 g/dL    Hematocrit 32.9 (L) 34.0 - 48.0 %    MCV 91.6 80.0 - 99.9 fL    MCH 29.8 26.0 - 35.0 pg    MCHC 32.5 32.0 - 34.5 %    RDW 12.9 11.5 - 15.0 fL    Platelets 124 425 - 910 E9/L    MPV 9.4 7.0 - 12.0 fL    Neutrophils % 64.2 43.0 - 80.0 %    Immature Granulocytes % 0.2 0.0 - 5.0 %    Lymphocytes % 25.0 20.0 - 42.0 %    Monocytes % 7.2 2.0 - 12.0 %    Eosinophils % 2.9 0.0 - 6.0 %    Basophils % 0.5 0.0 - 2.0 %    Neutrophils # 3.56 1.80 - 7.30 E9/L    Immature Granulocytes # 0.01 E9/L    Lymphocytes # 1.39 (L) 1.50 - 4.00 E9/L    Monocytes # 0.40 0.10 - 0.95 E9/L    Eosinophils # 0.16 0.05 - 0.50 E9/L    Basophils # 0.03 0.00 - 0.20 B5/T   Basic Metabolic Panel   Result Value Ref Range    Sodium 137 132 - 146 mmol/L    Potassium 4.6 3.5 - 5.0 mmol/L    Chloride 101 98 - 107 mmol/L    CO2 25 22 - 29 mmol/L    Anion Gap 11 7 - 16 mmol/L    Glucose 108 74 - 109 mg/dL    BUN 13 8 - 23 mg/dL    CREATININE 0.8 0.5 - 1.0 mg/dL    GFR Non-African American >60 >=60 mL/min/1.73    GFR African American >60     Calcium 9.0 8.6 - 10.2 mg/dL   Magnesium   Result Value Ref Range    Magnesium 1.8 1.6 - 2.6 mg/dL       RADIOLOGY:  Xr Thoracic Spine (3 Views)    Result Date: 2018  Patient MRN:  [id number] : 1927 Age: 80 years Gender: Female Order Date:  2018 9:15 PM EXAM: XR THORACIC SPINE (3 VIEWS) NUMBER OF IMAGES:  4 INDICATION: Back pain COMPARISON: CT chests 2017 FINDINGS: Diffuse osteopenia. There is normal alignment of the vertebral bodies with no identifiable compression fracture or spondylolisthesis. Age indeterminate compression fracture deformity of the visualized T12 and L1 with approximately 5-10% anterior height loss. Vertebroplasty at visualized L2. Mild degenerative disc disease of the mid and lower thoracic spine. . Pedicles are intact. No bony lytic or sclerotic lesion is identifiable. 1. Diffuse osteopenia. 2. Age indeterminate compression fracture deformity of T12 and L1 with approximately 5-10% anterior height loss since CT 2017. Xr Lumbar Spine (2-3 Views)    Result Date: 2018  Patient MRN:  [id number] : 1927 Age: 80 years Gender: Female Order Date:  2018 9:15 PM EXAM: XR LUMBAR SPINE (2-3 VIEWS) NUMBER OF IMAGES:  3 views,  X-rays of the lumbar spine (frontal, lateral views ). INDICATION: Low back pain COMPARISON: None FINDINGS: Diffuse osteopenia There are 5 nonrib-bearing lumbar vertebral bodies in near anatomic alignment with no identifiable n spondylolisthesis. Vertebroplasty at L2. Age indeterminate compression fracture deformity of L1 and T11 with approximately 5-10% anterior height loss. Endplates osteophytes are present. Moderate to severe facet joint arthropathy of the lower lumbar spine. Pedicles are intact. No pars interarticularis defect. Visualized sacroiliac joints are unremarkable. No bony lytic or sclerotic lesion is identifiable. 1. Diffuse osteopenia.  2. Vertebroplasty at L2. 3. Multilevel mild to moderate degenerative disc disease and moderate to severe facet joint arthropathy of the lower lumbar spine.           ------------------------- NURSING NOTES AND VITALS REVIEWED ---------------------------  Date / Time Roomed:  2018  8:46 AM  ED Bed Assignment:  5210/5210-A    The nursing notes within the ED encounter and vital signs as below have been reviewed. Patient Vitals for the past 24 hrs:   BP Temp Temp src Pulse Resp SpO2   07/29/18 0830 (!) 108/59 98 °F (36.7 °C) Oral 75 15 93 %   07/28/18 2328 130/70 98.4 °F (36.9 °C) Temporal 72 16 -   07/28/18 1615 135/66 98.5 °F (36.9 °C) Temporal 70 14 96 %       Oxygen Saturation Interpretation: Normal    ------------------------------------------ PROGRESS NOTES ------------------------------------------  Re-evaluation(s):  See above  Counseling:  I have spoken with the patient and discussed todays results, in addition to providing specific details for the plan of care and counseling regarding the diagnosis and prognosis. Their questions are answered at this time and they are agreeable with the plan of admission.    --------------------------------- ADDITIONAL PROVIDER NOTES ---------------------------------  Consultations:  See above    This patient's ED course included: a personal history and physicial examination and IV medications    This patient has remained hemodynamically stable during their ED course. Diagnosis:  1. Acute midline low back pain without sciatica    2. Intractable pain        Disposition:  Patient's disposition: Admit to med/surg floor  Patient's condition is stable.              Randy Ashford DO  Resident  07/29/18 [id number]

## 2018-07-29 LAB
ANION GAP SERPL CALCULATED.3IONS-SCNC: 11 MMOL/L (ref 7–16)
BASOPHILS ABSOLUTE: 0.03 E9/L (ref 0–0.2)
BASOPHILS RELATIVE PERCENT: 0.5 % (ref 0–2)
BUN BLDV-MCNC: 13 MG/DL (ref 8–23)
CALCIUM SERPL-MCNC: 9 MG/DL (ref 8.6–10.2)
CHLORIDE BLD-SCNC: 101 MMOL/L (ref 98–107)
CO2: 25 MMOL/L (ref 22–29)
CREAT SERPL-MCNC: 0.8 MG/DL (ref 0.5–1)
EOSINOPHILS ABSOLUTE: 0.16 E9/L (ref 0.05–0.5)
EOSINOPHILS RELATIVE PERCENT: 2.9 % (ref 0–6)
GFR AFRICAN AMERICAN: >60
GFR NON-AFRICAN AMERICAN: >60 ML/MIN/1.73
GLUCOSE BLD-MCNC: 108 MG/DL (ref 74–109)
HCT VFR BLD CALC: 32.9 % (ref 34–48)
HEMOGLOBIN: 10.7 G/DL (ref 11.5–15.5)
IMMATURE GRANULOCYTES #: 0.01 E9/L
IMMATURE GRANULOCYTES %: 0.2 % (ref 0–5)
LYMPHOCYTES ABSOLUTE: 1.39 E9/L (ref 1.5–4)
LYMPHOCYTES RELATIVE PERCENT: 25 % (ref 20–42)
MAGNESIUM: 1.8 MG/DL (ref 1.6–2.6)
MCH RBC QN AUTO: 29.8 PG (ref 26–35)
MCHC RBC AUTO-ENTMCNC: 32.5 % (ref 32–34.5)
MCV RBC AUTO: 91.6 FL (ref 80–99.9)
MONOCYTES ABSOLUTE: 0.4 E9/L (ref 0.1–0.95)
MONOCYTES RELATIVE PERCENT: 7.2 % (ref 2–12)
NEUTROPHILS ABSOLUTE: 3.56 E9/L (ref 1.8–7.3)
NEUTROPHILS RELATIVE PERCENT: 64.2 % (ref 43–80)
PDW BLD-RTO: 12.9 FL (ref 11.5–15)
PLATELET # BLD: 161 E9/L (ref 130–450)
PMV BLD AUTO: 9.4 FL (ref 7–12)
POTASSIUM SERPL-SCNC: 4.6 MMOL/L (ref 3.5–5)
RBC # BLD: 3.59 E12/L (ref 3.5–5.5)
SODIUM BLD-SCNC: 137 MMOL/L (ref 132–146)
WBC # BLD: 5.6 E9/L (ref 4.5–11.5)

## 2018-07-29 PROCEDURE — 80048 BASIC METABOLIC PNL TOTAL CA: CPT

## 2018-07-29 PROCEDURE — 36415 COLL VENOUS BLD VENIPUNCTURE: CPT

## 2018-07-29 PROCEDURE — 6360000002 HC RX W HCPCS: Performed by: STUDENT IN AN ORGANIZED HEALTH CARE EDUCATION/TRAINING PROGRAM

## 2018-07-29 PROCEDURE — 6360000002 HC RX W HCPCS: Performed by: INTERNAL MEDICINE

## 2018-07-29 PROCEDURE — 1200000000 HC SEMI PRIVATE

## 2018-07-29 PROCEDURE — 83735 ASSAY OF MAGNESIUM: CPT

## 2018-07-29 PROCEDURE — 85025 COMPLETE CBC W/AUTO DIFF WBC: CPT

## 2018-07-29 PROCEDURE — 2580000003 HC RX 258: Performed by: INTERNAL MEDICINE

## 2018-07-29 PROCEDURE — 6370000000 HC RX 637 (ALT 250 FOR IP): Performed by: INTERNAL MEDICINE

## 2018-07-29 RX ORDER — MORPHINE SULFATE 2 MG/ML
2 INJECTION, SOLUTION INTRAMUSCULAR; INTRAVENOUS EVERY 4 HOURS PRN
Status: DISCONTINUED | OUTPATIENT
Start: 2018-07-29 | End: 2018-08-02 | Stop reason: HOSPADM

## 2018-07-29 RX ADMIN — MORPHINE SULFATE 4 MG: 4 INJECTION INTRAVENOUS at 03:58

## 2018-07-29 RX ADMIN — MORPHINE SULFATE 2 MG: 2 INJECTION, SOLUTION INTRAMUSCULAR; INTRAVENOUS at 16:30

## 2018-07-29 RX ADMIN — MORPHINE SULFATE 2 MG: 2 INJECTION, SOLUTION INTRAMUSCULAR; INTRAVENOUS at 10:06

## 2018-07-29 RX ADMIN — Medication 10 ML: at 09:46

## 2018-07-29 RX ADMIN — BECLOMETHASONE DIPROPIONATE 2 PUFF: 80 AEROSOL, METERED RESPIRATORY (INHALATION) at 20:36

## 2018-07-29 RX ADMIN — ENOXAPARIN SODIUM 40 MG: 100 INJECTION SUBCUTANEOUS at 09:45

## 2018-07-29 RX ADMIN — ASPIRIN 81 MG: 81 TABLET, COATED ORAL at 09:45

## 2018-07-29 RX ADMIN — POLYETHYLENE GLYCOL 3350 17 G: 17 POWDER, FOR SOLUTION ORAL at 09:45

## 2018-07-29 RX ADMIN — CELECOXIB 200 MG: 100 CAPSULE ORAL at 09:45

## 2018-07-29 RX ADMIN — MORPHINE SULFATE 2 MG: 2 INJECTION, SOLUTION INTRAMUSCULAR; INTRAVENOUS at 20:36

## 2018-07-29 RX ADMIN — LEVOTHYROXINE SODIUM 50 MCG: 50 TABLET ORAL at 06:55

## 2018-07-29 RX ADMIN — CELECOXIB 200 MG: 100 CAPSULE ORAL at 20:36

## 2018-07-29 RX ADMIN — Medication 10 ML: at 20:36

## 2018-07-29 ASSESSMENT — PAIN SCALES - GENERAL
PAINLEVEL_OUTOF10: 4
PAINLEVEL_OUTOF10: 8
PAINLEVEL_OUTOF10: 4
PAINLEVEL_OUTOF10: 8
PAINLEVEL_OUTOF10: 7
PAINLEVEL_OUTOF10: 4
PAINLEVEL_OUTOF10: 7
PAINLEVEL_OUTOF10: 8
PAINLEVEL_OUTOF10: 4

## 2018-07-29 ASSESSMENT — PAIN DESCRIPTION - DESCRIPTORS
DESCRIPTORS: SORE;ACHING;DISCOMFORT
DESCRIPTORS: ACHING;DISCOMFORT
DESCRIPTORS: ACHING;DISCOMFORT

## 2018-07-29 ASSESSMENT — PAIN DESCRIPTION - PROGRESSION: CLINICAL_PROGRESSION: NOT CHANGED

## 2018-07-29 ASSESSMENT — PAIN DESCRIPTION - PAIN TYPE
TYPE: ACUTE PAIN

## 2018-07-29 ASSESSMENT — PAIN DESCRIPTION - LOCATION
LOCATION: BACK

## 2018-07-29 ASSESSMENT — PAIN DESCRIPTION - ONSET: ONSET: ON-GOING

## 2018-07-29 ASSESSMENT — PAIN DESCRIPTION - FREQUENCY: FREQUENCY: CONTINUOUS

## 2018-07-29 ASSESSMENT — PAIN DESCRIPTION - ORIENTATION: ORIENTATION: LOWER

## 2018-07-29 NOTE — PROGRESS NOTES
Seen and examined  Continues with T-L back pain, but somewhat improved  Was up to chair this morning  Awaiting MRI - Needs sedation. No bowel/bladder changes. No saddle anesthesia    NAD  NVSI  Compartments soft  No calf pain    A/P:  Acute low back pain  ? Acute compression fx  Await MRI.   Discussed possible kyphoplasy if fxs acute  Will D/W Dr. Kingston Call

## 2018-07-29 NOTE — H&P
signs  Extremities:  No clubbing, cyanosis, or edema  Skin:  Warm and dry, no open lesions or rash  Neuro:  Cranial nerves 2-12 intact, no focal deficits.   General physical deconditioning  Breast: deferred  Rectal: deferred  Genitalia:  deferred    LABS:    CBC with Differential:    Lab Results   Component Value Date    WBC 5.6 07/29/2018    RBC 3.59 07/29/2018    HGB 10.7 07/29/2018    HCT 32.9 07/29/2018     07/29/2018    MCV 91.6 07/29/2018    MCH 29.8 07/29/2018    MCHC 32.5 07/29/2018    RDW 12.9 07/29/2018    SEGSPCT 48 08/22/2012    LYMPHOPCT 25.0 07/29/2018    MONOPCT 7.2 07/29/2018    BASOPCT 0.5 07/29/2018    MONOSABS 0.40 07/29/2018    LYMPHSABS 1.39 07/29/2018    EOSABS 0.16 07/29/2018    BASOSABS 0.03 07/29/2018     CMP:    Lab Results   Component Value Date     07/29/2018    K 4.6 07/29/2018     07/29/2018    CO2 25 07/29/2018    BUN 13 07/29/2018    CREATININE 0.8 07/29/2018    GFRAA >60 07/29/2018    LABGLOM >60 07/29/2018    GLUCOSE 108 07/29/2018    PROT 5.7 09/25/2017    LABALBU 3.7 09/25/2017    CALCIUM 9.0 07/29/2018    BILITOT 0.4 09/25/2017    ALKPHOS 68 09/25/2017    AST 11 09/25/2017    ALT 9 09/25/2017     BMP:    Lab Results   Component Value Date     07/29/2018    K 4.6 07/29/2018     07/29/2018    CO2 25 07/29/2018    BUN 13 07/29/2018    LABALBU 3.7 09/25/2017    CREATININE 0.8 07/29/2018    CALCIUM 9.0 07/29/2018    GFRAA >60 07/29/2018    LABGLOM >60 07/29/2018    GLUCOSE 108 07/29/2018     Magnesium:    Lab Results   Component Value Date    MG 1.8 07/29/2018     Phosphorus:    Lab Results   Component Value Date    PHOS 4.4 09/24/2017     PT/INR:    Lab Results   Component Value Date    PROTIME 12.1 09/06/2017    INR 1.1 09/06/2017     PTT:    Lab Results   Component Value Date    APTT 29.6 09/06/2017   [APTT}  Troponin:    Lab Results   Component Value Date    TROPONINI <0.01 05/02/2018     Last 3 Troponin:    Lab Results   Component Value Date

## 2018-07-29 NOTE — PROGRESS NOTES
Jenae Carlson was ordered Dexilant (dexlansoprazole) which is a nonformulary medication. Patient is unable to take our formulary alternative, Protonix (pantoprazole), due to an allergy. The patient has indicated that the home supply of this medication will be brought in to the hospital for inpatient use. If the medication has not been administered by 1400 on the following day from the time the order was placed, a pharmacist will follow-up with the nurse of the patient to assess the capability of the patient to bring in the medication. If it is determined that the patient cannot supply the medication and it is not available to be dispensed from the pharmacy, a call will be placed to the ordering provider to discuss alternative options.       Thank you,  Roselia Camacho, PharmD 7/29/2018 8:54 AM

## 2018-07-29 NOTE — PROGRESS NOTES
Physical Therapy    PT order received, chart reviewed and PT evaluation held this date. Pt awaiting x-rays of lumbar & thoracic spine and MRI of lumbar spine. Will attempt following completion of imaging. Thank you for the opportunity to assist in the care of this patient.   Winsome Monet, PT, DPT  License PT [id number]

## 2018-07-29 NOTE — PROGRESS NOTES
OT BEDSIDE TREATMENT NOTE      Date:2018  Patient Name: Linda Walter  MRN: [id number]  : 1927  Room: [address] [place] [address]-A     OT consult received & appreciated, chart reviewed. Currently awaiting pending x-rays of lumbar & thoracic spine, MRI of lumbar spine, further Ortho orders. Will check back at a later time. Thank you,  Sienna Vázquez.  LEANDER BlackR/L   License #  [id number]

## 2018-07-29 NOTE — CONSULTS
BID  [START ON 7/29/2018] dexlansoprazole (DEXILANT) delayed release capsule 60 mg, 60 mg, Oral, QAM AC  [START ON 7/29/2018] levothyroxine (SYNTHROID) tablet 50 mcg, 50 mcg, Oral, Daily  [START ON 7/29/2018] polyethylene glycol (GLYCOLAX) packet 17 g, 17 g, Oral, Daily  SUMAtriptan (IMITREX) tablet 100 mg, 100 mg, Oral, Daily PRN  sodium chloride flush 0.9 % injection 10 mL, 10 mL, Intravenous, 2 times per day  sodium chloride flush 0.9 % injection 10 mL, 10 mL, Intravenous, PRN  magnesium hydroxide (MILK OF MAGNESIA) 400 MG/5ML suspension 30 mL, 30 mL, Oral, Daily PRN  ondansetron (ZOFRAN) injection 4 mg, 4 mg, Intravenous, Q6H PRN  enoxaparin (LOVENOX) injection 40 mg, 40 mg, Subcutaneous, Daily  morphine (PF) injection 2 mg, 2 mg, Intravenous, Q4H PRN **OR** morphine (PF) injection 4 mg, 4 mg, Intravenous, Q4H PRN  traMADol (ULTRAM) tablet 50 mg, 50 mg, Oral, Q6H PRN **OR** traMADol (ULTRAM) tablet 100 mg, 100 mg, Oral, Q8H PRN  Allergies:  Amlodipine besylate; Bentyl [dicyclomine hcl]; Codeine; Mobic [meloxicam]; Other; Pantoprazole; Prednisone; and Talwin [pentazocine]    Social History:   TOBACCO:   reports that she quit smoking about 22 years ago. She has never used smokeless tobacco.  ETOH:   reports that she does not drink alcohol. DRUGS:   reports that she does not use drugs. ACTIVITIES OF DAILY LIVING:    OCCUPATION:    Family History:   No family history on file.     REVIEW OF SYSTEMS:  CONSTITUTIONAL:  negative for  fevers, chills  EYES:  negative for blurred vision, visual disturbance  HEENT:  negative for  hearing loss, voice change  RESPIRATORY:  negative for  dyspnea, wheezing  CARDIOVASCULAR:  negative for  chest pain, palpitations  GASTROINTESTINAL:  negative for nausea, vomiting  GENITOURINARY:  negative for frequency, urinary incontinence  HEMATOLOGIC/LYMPHATIC:  negative for bleeding and petechiae  MUSCULOSKELETAL:  positive for  Lumbar back pain  NEUROLOGICAL:  negative for headaches,

## 2018-07-30 ENCOUNTER — APPOINTMENT (OUTPATIENT)
Dept: MRI IMAGING | Age: 83
DRG: 479 | End: 2018-07-30
Payer: MEDICARE

## 2018-07-30 ENCOUNTER — ANESTHESIA (OUTPATIENT)
Dept: MRI IMAGING | Age: 83
DRG: 479 | End: 2018-07-30
Payer: MEDICARE

## 2018-07-30 ENCOUNTER — ANESTHESIA EVENT (OUTPATIENT)
Dept: MRI IMAGING | Age: 83
DRG: 479 | End: 2018-07-30
Payer: MEDICARE

## 2018-07-30 VITALS
RESPIRATION RATE: 10 BRPM | OXYGEN SATURATION: 96 % | DIASTOLIC BLOOD PRESSURE: 47 MMHG | SYSTOLIC BLOOD PRESSURE: 100 MMHG

## 2018-07-30 PROCEDURE — 72146 MRI CHEST SPINE W/O DYE: CPT

## 2018-07-30 PROCEDURE — G8979 MOBILITY GOAL STATUS: HCPCS

## 2018-07-30 PROCEDURE — G8987 SELF CARE CURRENT STATUS: HCPCS

## 2018-07-30 PROCEDURE — 97530 THERAPEUTIC ACTIVITIES: CPT

## 2018-07-30 PROCEDURE — 6360000002 HC RX W HCPCS: Performed by: NURSE ANESTHETIST, CERTIFIED REGISTERED

## 2018-07-30 PROCEDURE — 2580000003 HC RX 258: Performed by: INTERNAL MEDICINE

## 2018-07-30 PROCEDURE — 6360000002 HC RX W HCPCS: Performed by: STUDENT IN AN ORGANIZED HEALTH CARE EDUCATION/TRAINING PROGRAM

## 2018-07-30 PROCEDURE — 6370000000 HC RX 637 (ALT 250 FOR IP): Performed by: INTERNAL MEDICINE

## 2018-07-30 PROCEDURE — 7100000001 HC PACU RECOVERY - ADDTL 15 MIN

## 2018-07-30 PROCEDURE — 1200000000 HC SEMI PRIVATE

## 2018-07-30 PROCEDURE — 2580000003 HC RX 258: Performed by: NURSE ANESTHETIST, CERTIFIED REGISTERED

## 2018-07-30 PROCEDURE — 97165 OT EVAL LOW COMPLEX 30 MIN: CPT

## 2018-07-30 PROCEDURE — 3700000001 HC ADD 15 MINUTES (ANESTHESIA)

## 2018-07-30 PROCEDURE — 6360000002 HC RX W HCPCS: Performed by: INTERNAL MEDICINE

## 2018-07-30 PROCEDURE — 7100000000 HC PACU RECOVERY - FIRST 15 MIN

## 2018-07-30 PROCEDURE — 72148 MRI LUMBAR SPINE W/O DYE: CPT

## 2018-07-30 PROCEDURE — G8988 SELF CARE GOAL STATUS: HCPCS

## 2018-07-30 PROCEDURE — G8978 MOBILITY CURRENT STATUS: HCPCS

## 2018-07-30 PROCEDURE — 97161 PT EVAL LOW COMPLEX 20 MIN: CPT

## 2018-07-30 PROCEDURE — 3700000000 HC ANESTHESIA ATTENDED CARE

## 2018-07-30 RX ORDER — SODIUM CHLORIDE 9 MG/ML
INJECTION, SOLUTION INTRAVENOUS CONTINUOUS PRN
Status: DISCONTINUED | OUTPATIENT
Start: 2018-07-30 | End: 2018-07-30 | Stop reason: SDUPTHER

## 2018-07-30 RX ORDER — PROPOFOL 10 MG/ML
INJECTION, EMULSION INTRAVENOUS CONTINUOUS PRN
Status: DISCONTINUED | OUTPATIENT
Start: 2018-07-30 | End: 2018-07-30 | Stop reason: SDUPTHER

## 2018-07-30 RX ADMIN — Medication 10 ML: at 19:53

## 2018-07-30 RX ADMIN — MORPHINE SULFATE 2 MG: 2 INJECTION, SOLUTION INTRAMUSCULAR; INTRAVENOUS at 14:12

## 2018-07-30 RX ADMIN — Medication 10 ML: at 18:39

## 2018-07-30 RX ADMIN — Medication 10 ML: at 14:12

## 2018-07-30 RX ADMIN — LEVOTHYROXINE SODIUM 50 MCG: 50 TABLET ORAL at 14:03

## 2018-07-30 RX ADMIN — PROPOFOL 40 MCG/KG/MIN: 10 INJECTION, EMULSION INTRAVENOUS at 11:25

## 2018-07-30 RX ADMIN — Medication 10 ML: at 23:49

## 2018-07-30 RX ADMIN — CELECOXIB 200 MG: 100 CAPSULE ORAL at 14:02

## 2018-07-30 RX ADMIN — SODIUM CHLORIDE: 9 INJECTION, SOLUTION INTRAVENOUS at 11:22

## 2018-07-30 RX ADMIN — ENOXAPARIN SODIUM 40 MG: 100 INJECTION SUBCUTANEOUS at 14:03

## 2018-07-30 RX ADMIN — Medication 10 ML: at 07:49

## 2018-07-30 RX ADMIN — CELECOXIB 200 MG: 100 CAPSULE ORAL at 19:52

## 2018-07-30 RX ADMIN — MORPHINE SULFATE 2 MG: 2 INJECTION, SOLUTION INTRAMUSCULAR; INTRAVENOUS at 07:49

## 2018-07-30 RX ADMIN — MORPHINE SULFATE 2 MG: 2 INJECTION, SOLUTION INTRAMUSCULAR; INTRAVENOUS at 18:38

## 2018-07-30 RX ADMIN — ASPIRIN 81 MG: 81 TABLET, COATED ORAL at 14:02

## 2018-07-30 RX ADMIN — POLYETHYLENE GLYCOL 3350 17 G: 17 POWDER, FOR SOLUTION ORAL at 19:52

## 2018-07-30 RX ADMIN — DEXLANSOPRAZOLE 60 MG: 60 CAPSULE, DELAYED RELEASE ORAL at 14:03

## 2018-07-30 RX ADMIN — MORPHINE SULFATE 2 MG: 2 INJECTION, SOLUTION INTRAMUSCULAR; INTRAVENOUS at 00:47

## 2018-07-30 RX ADMIN — MORPHINE SULFATE 2 MG: 2 INJECTION, SOLUTION INTRAMUSCULAR; INTRAVENOUS at 23:49

## 2018-07-30 RX ADMIN — BECLOMETHASONE DIPROPIONATE 2 PUFF: 80 AEROSOL, METERED RESPIRATORY (INHALATION) at 07:49

## 2018-07-30 ASSESSMENT — PAIN DESCRIPTION - ORIENTATION
ORIENTATION: LOWER
ORIENTATION: LOWER
ORIENTATION: LOWER;MID
ORIENTATION: MID;LOWER

## 2018-07-30 ASSESSMENT — PAIN DESCRIPTION - ONSET
ONSET: ON-GOING

## 2018-07-30 ASSESSMENT — PAIN SCALES - GENERAL
PAINLEVEL_OUTOF10: 10
PAINLEVEL_OUTOF10: 10
PAINLEVEL_OUTOF10: 8
PAINLEVEL_OUTOF10: 4
PAINLEVEL_OUTOF10: 9
PAINLEVEL_OUTOF10: 8
PAINLEVEL_OUTOF10: 0
PAINLEVEL_OUTOF10: 8
PAINLEVEL_OUTOF10: 7
PAINLEVEL_OUTOF10: 8
PAINLEVEL_OUTOF10: 0
PAINLEVEL_OUTOF10: 3

## 2018-07-30 ASSESSMENT — PAIN DESCRIPTION - PAIN TYPE
TYPE: ACUTE PAIN

## 2018-07-30 ASSESSMENT — PAIN DESCRIPTION - LOCATION
LOCATION: BACK

## 2018-07-30 ASSESSMENT — PAIN DESCRIPTION - PROGRESSION
CLINICAL_PROGRESSION: NOT CHANGED
CLINICAL_PROGRESSION: NOT CHANGED

## 2018-07-30 ASSESSMENT — ENCOUNTER SYMPTOMS: SHORTNESS OF BREATH: 0

## 2018-07-30 ASSESSMENT — PAIN DESCRIPTION - DESCRIPTORS
DESCRIPTORS: ACHING;DISCOMFORT

## 2018-07-30 ASSESSMENT — PAIN DESCRIPTION - FREQUENCY
FREQUENCY: CONTINUOUS

## 2018-07-30 ASSESSMENT — COPD QUESTIONNAIRES: CAT_SEVERITY: NO INTERVAL CHANGE

## 2018-07-30 NOTE — PROGRESS NOTES
Dr. Ludwig Reynolds read MRI result      MRI result was read over the phone by the nurse. At this time. No official read took place by the resident at this juncture.      Electronically signed by Leesa Leonardo DO on 7/30/2018 at 4:47 PM

## 2018-07-30 NOTE — PROGRESS NOTES
Occupational Therapy            OCCUPATIONAL THERAPY INITIAL EVALUATION      Date:2018  Patient Name: Roger Taveras  MRN: [id number]  : 1927  Room: [address] [place] [address]     Evaluating OT: Karl Rutherford OTR/YENI #[id number]    Recommended Adaptive Equipment: tbd     AM PAC ADL RAW SCORE -  19/24  G CODE: CK  Diagnosis: back pain   Patient Active Problem List   Diagnosis    IBS (irritable bowel syndrome)    [facility]    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[facility] [address]    Meningioma ([facility] [address] [facility]    Acquired hypothyroidism    Back pain       Surgery:   Past Surgical History:   Procedure Laterality Date    ANKLE SURGERY      pin placement    APPENDECTOMY      BLEPHAROPLASTY      BREAST BIOPSY      BREAST SURGERY      biopsy bilat     CHOLECYSTECTOMY      COLONOSCOPY      ENDOSCOPY, COLON, DIAGNOSTIC  2012    ERCP  10/31/2012    ERCP  2012    stent removal    EYE SURGERY      cataract    FIXATION KYPHOPLASTY  2016    LUMBAR 2    FRACTURE SURGERY      left ankle, pin placement    HYSTERECTOMY      THYROID SURGERY      biopsy    TONSILLECTOMY          Past Medical History:   Past Medical History:   Diagnosis Date    Acid reflux disease     Arthritis     Chronic back pain     COPD (chronic obstructive pulmonary disease) (HCC)     Hearing loss     Hyperlipidemia     Irritable bowel     Migraine     Neuropathy (HCC)     Osteoarthritis     Osteoporosis     Thyroid disease     hypo       Precautions: falls, spinal neutral mechanics,    Patient agreeable to treatment yes  Home Living: Pt lives alone in a [facility] home  with one stairs to enter and one rails; bed/bath on main floor. basement laundry with full flight of steps with HR. Bathroom setup: tub shower with HH shower and elevated with HR commode  Equipment owned: lois.w/c, [facility] shower seat    Prior Level of Function: mod I with ADLs Independent   with IADLs; using SPC prn for ambulation. daughter assists with changing sheets and transportation. Driving: no   Medication management:self  Occupation: enjoys cooking and adult coloring books. Pain Level: pt c/o 7/10 mid back pain at rest and increase with mobility this session     Cognition: oriented x 3; follows multi step directions. Good- Problem solving skills  Good- Memory   good Sequencing  Additional Comments: alert and cooperative , followed commands with good- safety  Sensory:   Hearing: WFL  Vision: WFL    Glasses: yes [x] no [] reading []      UE Assessment:  Hand Dominance: Right [x]  Left []     Strength ROM Additional Info:    RUE  WFL WFL WFL  and WFL FMC/dexterity noted during ADL tasks     [facility] WFL WFL  and WFL FMC/dexterity noted during ADL tasks   Sensation: intact  Tone: WFL  Edema:none noted    Functional Assessment:   Initial Eval Status 7/30/18 Comments   Feeding  Independent    Grooming  Supervision hand washing cues for walker safety    Upper Body Dressing supervision     Lower Body Dressing supervision    Bathing supervision    Toileting  Supervision able to perform bladder hygiene    Bed Mobility  Supine to Sit: supervision   Sit to Supine:supervision Reinforced spinal neutral mechanics   Functional Transfers Sit to stand supervision   Functional Mobility Supervision with ww and min A with HH assist      Sit balance: good  Stand balance: good-  Endurance/Activity tolerance: good-                              Comments: Upon arrival pt supine in bed. At end of session pt supine with HOB elevated with all devices within reach, all lines and tubes intact. Nursing notified. Treatment: OT evaluation, education on spinal neutral mechanics with bed mobility, LE ADL's and bathroom safety, toileting and grooming task, OT for functional assessment of  ADL, Functional Transfer/Mobility Training, Equipment Needs, Energy Conservation Techniques, Pt/Family Education, OT role and POC reviewed. Patient demo good- understanding of spinal neutral mechanics and bathroom safety and walker safety. Assessment of current deficits   Functional mobility [x]  ADLs [x] Strength []  Cognition []  Functional transfers  [x] IADLs [x] Safety Awareness [x]  Endurance [x]  Fine Motor Coordination [] Balance [x] Vision/perception [] Sensation []   Gross Motor Coordination [] ROM []       Eval Complexity: low  Profile and History- low  Assessment of Occupational Performance and Identification of Deficits- low  Clinical Decision Making- low    Treatment frequency: OT 2-4x     Plan of Care:  ADL retraining [x]   Equipment needs [x]   Neuromuscular re-education []  Energy Conservation Techniques [x]  Functional Transfer training [x]  Patient and/or Family Education [x]  Functional Mobility training [x]  Environmental Modifications []  Cognitive re-training []    Compensatory techniques for ADLs [x]  Splinting Needs []   Positioning to improve overall function [x]  Other: []      Rehab Potential: good    Patient / Family Goal: decrease back pain    Short term goals  Time Frame: 2-4 days  STG #1:  Demo good follow through of spinal neutral  precautions in ADL's and transfers      STG #2:  Increase grooming to mod I in standing/ sitting with ww  STGl #3   Increase UE dressing and bathing to Independent    STG #4   Increase LE dressing and bathing to mod I with AE prn   STG #5   Increase functional commode transfer to mod I with ww  STG #6   Increase safety and problem solving to good during ADL's  STG #7   Increase functional activity tolerance to good in ADL tasks      Patient and/or family understands diagnosis, prognosis and plan of care: yes    [] Malnutrition indicators have been identified and nursing has been notified to ensure a dietitian consult is ordered.      Comments: Based on patient's functional performance as stated above and level of assistance needed prior to admission, this therapist believes that the patient would benefit from further skilled OT following

## 2018-07-30 NOTE — PROGRESS NOTES
Physical Therapy  Initial Assessment     Name: Dariel Michelle  : 1927  MRN: [id number]    Date of Service: 2018    Evaluating PT:  Gracia Reese, PT [id number]    Room #:  5210/5210-A  Diagnosis:  Back pain. PMHx:        Diagnosis Date    Acid reflux disease     Arthritis     Chronic back pain     COPD (chronic obstructive pulmonary disease) (HCC)     Hearing loss     Hyperlipidemia     Irritable bowel     Migraine     Neuropathy (HCC)     Osteoarthritis     Osteoporosis     Thyroid disease     hypo     Precautions:  Spinal neutral mechanics. Equipment Needs:  none    Pt lives with Ind in a [place] [facility] style home lives on the 1st floor with 1 stairs to enter and no rail. Bed is on 1 floor and bath is on 1 floor. Pt ambulated with Ind PTA. Equipment owned: [employer], [employer], w/c     Initial Evaluation  Date: 18 Treatment Short Term/ Long Term   Goals   AM-PAC 6 Clicks 38/08     Was pt agreeable to Eval/treatment? yes     Does pt have pain? Yes 7/10 low back pain. Bed Mobility  Rolling: Ind  Supine to sit: Ind  Sit to supine: In  Scooting: Ind  Ind   Transfers Sit to stand: Sup  Stand to sit: Sup  Stand pivot: Sup  Sit to stand: Ind  Stand to sit: Ind  Stand pivot: Ind   Ambulation    120 feet with  fww initially then no device no LOB at Min A level. >200 feet with  Ind   Stair negotiation: ascended and descended  NT  1 steps with SBA   ROM BUE:  WFL  BLE:  WFL     Strength BUE: WFL  BLE:  4/5  4+/5   Balance Sitting EOB:  Ind  Dynamic Standing:  Sup  Sitting EOB:  Ind  Dynamic Standing:  Ind     Pt is A & O x 3  Sensation:  Pt denies numbness and tingling to extremities  Edema:  none    Patient education  Pt educated on spinal neutral mechanics with poor follow through. Patient response to education:   Pt verbalized understanding Pt demonstrated skill Pt requires further education in this area   yes no yes     Comments:  Patient was seen this date for PT evaluation.  Results of the functional assessment are noted above. Upon entering the room patient was found in supine position in bed. Completed gait initially with fww then without device without LOB. Following assessment patient elected to lay back down to rest. . Call light placed within reach and all lines were intact. This patient can benefit from the continuation of skilled PT while in acute care facility to maximize functional level and return to PLOF. Pts/ family goals   1. Return to home. Patient and or family understand(s) diagnosis, prognosis, and plan of care. yes    PLAN  PT care will be provided in accordance with the objectives noted above. Whenever appropriate, clear delegation orders will be provided for nursing staff. Exercises and functional mobility practice will be used as well as appropriate assistive devices or modalities to obtain goals. Patient and family education will also be administered as needed. Frequency of treatments: 2-5x/week x 7-10 days.     Time in  94050  Time out  [address] [facility], [address] [facility]

## 2018-07-30 NOTE — ANESTHESIA PRE PROCEDURE
procedure    Historical Provider, MD       Current medications:    Current Facility-Administered Medications   Medication Dose Route Frequency Provider Last Rate Last Dose    morphine (PF) injection 2 mg  2 mg Intravenous Q4H PRN Will B Beucler, DO   2 mg at 07/30/18 0749    aspirin EC tablet 81 mg  81 mg Oral Daily Adriana Dolan MD   81 mg at 07/29/18 0945    beclomethasone (QVAR) 80 MCG/ACT inhaler 2 puff  2 puff Inhalation BID dAriana Dolan MD   2 puff at 07/30/18 7516    celecoxib (CELEBREX) capsule 200 mg  200 mg Oral BID Adriana Dolan MD   200 mg at 07/29/18 2036    dexlansoprazole (DEXILANT) delayed release capsule 60 mg  60 mg Oral QAM AC Adriana Dolan MD        levothyroxine (SYNTHROID) tablet 50 mcg  50 mcg Oral Daily Adriana Dolan MD   50 mcg at 07/29/18 6967    polyethylene glycol (GLYCOLAX) packet 17 g  17 g Oral Daily Adriana Dolan MD   17 g at 07/29/18 0945    SUMAtriptan (IMITREX) tablet 100 mg  100 mg Oral Daily PRN Adriana Dolan MD        sodium chloride flush 0.9 % injection 10 mL  10 mL Intravenous 2 times per day Adriana Dolan MD   10 mL at 07/30/18 0749    sodium chloride flush 0.9 % injection 10 mL  10 mL Intravenous PRN Adriana Dolan MD        magnesium hydroxide (MILK OF MAGNESIA) 400 MG/5ML suspension 30 mL  30 mL Oral Daily PRN Adriana Dolan MD        ondansetron [facility]) injection 4 mg  4 mg Intravenous Q6H PRN Adriana Dolan MD        enoxaparin (LOVENOX) injection 40 mg  40 mg Subcutaneous Daily Adriana Dolan MD   40 mg at 07/29/18 0945    traMADol (ULTRAM) tablet 50 mg  50 mg Oral Q6H PRN Corinne Riverside, DO        Or    traMADol Frankie Fortis) tablet 100 mg  100 mg Oral Q8H PRN Corinne Riverside, DO           Allergies:     Allergies   Allergen Reactions    Amlodipine Besylate     Bentyl [Dicyclomine Hcl]     Codeine     Mobic [Meloxicam]     Other      Artificial Sweetners    Pantoprazole     Prednisone     Talwin [Pentazocine]        Problem List:    Patient Active Problem List   Diagnosis Code    IBS (irritable bowel syndrome) K58.9    COPD (chronic obstructive pulmonary disease) ([facility]) J44.9    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    Meningioma ([facility] D32.9    Acquired hypothyroidism E03.9    Back pain M54.9       Past Medical History:        Diagnosis Date    Acid reflux disease     Arthritis     Chronic back pain     COPD (chronic obstructive pulmonary disease) ([facility]     Hearing loss     Hyperlipidemia     Irritable bowel     Migraine     Neuropathy ([facility]     Osteoarthritis     Osteoporosis     Thyroid disease     hypo       Past Surgical History:        Procedure Laterality Date    ANKLE SURGERY  1980    pin placement    APPENDECTOMY      BLEPHAROPLASTY      BREAST BIOPSY  1978    BREAST SURGERY      biopsy bilat     CHOLECYSTECTOMY      COLONOSCOPY      ENDOSCOPY, COLON, DIAGNOSTIC  8/2012    ERCP  10/31/2012    ERCP  12/05/2012    stent removal    EYE SURGERY      cataract    FIXATION KYPHOPLASTY  08/03/2016    LUMBAR 2    FRACTURE SURGERY      left ankle, pin placement    HYSTERECTOMY      THYROID SURGERY      biopsy    TONSILLECTOMY         Social History:    Social History   Substance Use Topics    Smoking status: Former Smoker     Quit date: 1/23/1996    Smokeless tobacco: Never Used    Alcohol use No                                Counseling given: Not Answered      Vital Signs (Current):   Vitals:    07/29/18 1702 07/29/18 2334 07/30/18 0730 07/30/18 0901   BP: (!) 127/59 126/62 135/65    Pulse: 65 64 68    Resp: 16 16 16    Temp: 36.7 °C (98.1 °F) 36.9 °C (98.4 °F) 36.7 °C (98 °F)    TempSrc: Temporal Temporal Oral    SpO2: 96%  95%    Weight:       Height:    5' 1.02\" (1.55 m)                                              BP Readings from Last 3 Encounters:   07/30/18 135/65   06/26/18 130/78   05/04/18 135/64       NPO Status:   0000 7/30/2018, >10 hours

## 2018-07-31 ENCOUNTER — ANESTHESIA (OUTPATIENT)
Dept: OPERATING ROOM | Age: 83
DRG: 479 | End: 2018-07-31
Payer: MEDICARE

## 2018-07-31 ENCOUNTER — ANESTHESIA EVENT (OUTPATIENT)
Dept: OPERATING ROOM | Age: 83
DRG: 479 | End: 2018-07-31
Payer: MEDICARE

## 2018-07-31 PROCEDURE — 6370000000 HC RX 637 (ALT 250 FOR IP): Performed by: STUDENT IN AN ORGANIZED HEALTH CARE EDUCATION/TRAINING PROGRAM

## 2018-07-31 PROCEDURE — 1200000000 HC SEMI PRIVATE

## 2018-07-31 PROCEDURE — 6360000002 HC RX W HCPCS: Performed by: ORTHOPAEDIC SURGERY

## 2018-07-31 PROCEDURE — 2580000003 HC RX 258: Performed by: INTERNAL MEDICINE

## 2018-07-31 PROCEDURE — 6360000002 HC RX W HCPCS: Performed by: STUDENT IN AN ORGANIZED HEALTH CARE EDUCATION/TRAINING PROGRAM

## 2018-07-31 PROCEDURE — 6370000000 HC RX 637 (ALT 250 FOR IP): Performed by: INTERNAL MEDICINE

## 2018-07-31 RX ADMIN — BECLOMETHASONE DIPROPIONATE 2 PUFF: 80 AEROSOL, METERED RESPIRATORY (INHALATION) at 08:55

## 2018-07-31 RX ADMIN — TRAMADOL HYDROCHLORIDE 50 MG: 50 TABLET, FILM COATED ORAL at 21:29

## 2018-07-31 RX ADMIN — Medication 10 ML: at 18:58

## 2018-07-31 RX ADMIN — MORPHINE SULFATE 2 MG: 2 INJECTION, SOLUTION INTRAMUSCULAR; INTRAVENOUS at 14:23

## 2018-07-31 RX ADMIN — POLYETHYLENE GLYCOL 3350 17 G: 17 POWDER, FOR SOLUTION ORAL at 20:24

## 2018-07-31 RX ADMIN — CEFAZOLIN SODIUM 1 G: 1 SOLUTION INTRAVENOUS at 07:23

## 2018-07-31 RX ADMIN — Medication 10 ML: at 20:19

## 2018-07-31 RX ADMIN — CELECOXIB 200 MG: 100 CAPSULE ORAL at 20:19

## 2018-07-31 RX ADMIN — Medication 10 ML: at 08:38

## 2018-07-31 RX ADMIN — MORPHINE SULFATE 2 MG: 2 INJECTION, SOLUTION INTRAMUSCULAR; INTRAVENOUS at 08:34

## 2018-07-31 RX ADMIN — MORPHINE SULFATE 2 MG: 2 INJECTION, SOLUTION INTRAMUSCULAR; INTRAVENOUS at 18:58

## 2018-07-31 ASSESSMENT — PAIN DESCRIPTION - DESCRIPTORS
DESCRIPTORS: ACHING;DISCOMFORT
DESCRIPTORS: ACHING;DISCOMFORT
DESCRIPTORS: ACHING;DULL;DISCOMFORT

## 2018-07-31 ASSESSMENT — PAIN DESCRIPTION - FREQUENCY
FREQUENCY: CONTINUOUS
FREQUENCY: CONTINUOUS

## 2018-07-31 ASSESSMENT — PAIN SCALES - GENERAL
PAINLEVEL_OUTOF10: 8
PAINLEVEL_OUTOF10: 6
PAINLEVEL_OUTOF10: 3
PAINLEVEL_OUTOF10: 9
PAINLEVEL_OUTOF10: 5
PAINLEVEL_OUTOF10: 8
PAINLEVEL_OUTOF10: 0
PAINLEVEL_OUTOF10: 4
PAINLEVEL_OUTOF10: 9
PAINLEVEL_OUTOF10: 8

## 2018-07-31 ASSESSMENT — PAIN DESCRIPTION - PAIN TYPE
TYPE: ACUTE PAIN

## 2018-07-31 ASSESSMENT — PAIN DESCRIPTION - LOCATION
LOCATION: BACK

## 2018-07-31 ASSESSMENT — PAIN DESCRIPTION - ONSET
ONSET: ON-GOING
ONSET: ON-GOING

## 2018-07-31 ASSESSMENT — PAIN DESCRIPTION - ORIENTATION
ORIENTATION: LOWER

## 2018-07-31 NOTE — ANESTHESIA PRE PROCEDURE
Department of Anesthesiology  Preprocedure Note       Name:  Dariel Michelle   Age:  80 y.o.  :  1927                                          MRN:  [id number]         Date:  2018      Surgeon: Carmina Zapata):  Marium Cummins DO    Procedure: Procedure(s):  KYPHOPLASTY T11    Medications prior to admission:   Prior to Admission medications    Medication Sig Start Date End Date Taking? Authorizing Provider   Multiple Vitamins-Minerals (PRESERVISION AREDS 2) CAPS Take by mouth   Yes Historical Provider, MD   NONFORMULARY    Yes Historical Provider, MD   celecoxib (CELEBREX) 200 MG capsule Take 200 mg by mouth 2 times daily   Yes Historical Provider, MD   traMADol (ULTRAM) 50 MG tablet Take 50 mg by mouth every 6 hours as needed for Pain   Yes Historical Provider, MD   aspirin 81 MG EC tablet Take 1 tablet by mouth daily 17  Yes Leslie Martinez DO   acetaminophen (TYLENOL) 325 MG tablet Take 650 mg by mouth every 6 hours as needed for Pain   Yes Historical Provider, MD   Cholecalciferol (VITAMIN D3) 5000 units TABS Take 20,000 Units by mouth daily   Yes Historical Provider, MD   Calcium-Vitamin D (CALTRATE 600 PLUS-VIT D PO) Take by mouth LD 2016   Yes Historical Provider, MD   budesonide (PULMICORT) 180 MCG/ACT AEPB inhaler Inhale 2 puffs into the lungs 2 times daily Use am of 10/31/16   Yes Historical Provider, MD   levothyroxine (SYNTHROID) 50 MCG tablet Take 50 mcg by mouth Six times weekly    Yes Historical Provider, MD   polyethylene glycol (GLYCOLAX) packet Take 17 g by mouth daily as needed for Constipation    Historical Provider, MD   BACITRACIN-POLYMYXIN B, OPHTH, OINT Apply to eye    Historical Provider, MD   SUMAtriptan (IMITREX) 100 MG tablet Take 100 mg by mouth once as needed for Migraine    Historical Provider, MD   bacitracin 500 UNIT/GM ointment Apply topically 2 times daily Apply topically 2 times daily.     Historical Provider, MD   dexlansoprazole (DEXILANT) 60 MG CPDR capsule Take 60 mg by mouth daily Instructed to take am of procedure    Historical Provider, MD       Current medications:    Current Facility-Administered Medications   Medication Dose Route Frequency Provider Last Rate Last Dose    morphine (PF) injection 2 mg  2 mg Intravenous Q4H PRN Will B Beucler, DO   2 mg at 07/31/18 0834    beclomethasone (QVAR) 80 MCG/ACT inhaler 2 puff  2 puff Inhalation BID Wayne Lewis MD   2 puff at 07/30/18 1950    celecoxib (CELEBREX) capsule 200 mg  200 mg Oral BID Wayne Lewis MD   200 mg at 07/30/18 1952    dexlansoprazole (DEXILANT) delayed release capsule 60 mg  60 mg Oral QAM AC Wayne Lewis MD   60 mg at 07/30/18 1403    levothyroxine (SYNTHROID) tablet 50 mcg  50 mcg Oral Daily Wayne Lewis MD   50 mcg at 07/30/18 1403    polyethylene glycol (GLYCOLAX) packet 17 g  17 g Oral Daily Wayne Lewis MD   17 g at 07/30/18 1952    SUMAtriptan (IMITREX) tablet 100 mg  100 mg Oral Daily PRN Wayne Lewis MD        sodium chloride flush 0.9 % injection 10 mL  10 mL Intravenous 2 times per day Wayne Lewis MD   10 mL at 07/31/18 0838    sodium chloride flush 0.9 % injection 10 mL  10 mL Intravenous PRN Wayne Lewis MD   10 mL at 07/30/18 2349    magnesium hydroxide (MILK OF MAGNESIA) 400 MG/5ML suspension 30 mL  30 mL Oral Daily PRN Wayne Lewis MD        ondansetron [facility]) injection 4 mg  4 mg Intravenous Q6H PRN Wayne Lewis MD        traMADol Camillo Pattee) tablet 50 mg  50 mg Oral Q6H PRN Rolando Madison DO        Or    traMADol Camillo Pattee) tablet 100 mg  100 mg Oral Q8H PRN Rolando Madison DO           Allergies:     Allergies   Allergen Reactions    Amlodipine Besylate     Bentyl [Dicyclomine Hcl]     Codeine     Mobic [Meloxicam]     Other      Artificial Sweetners    Pantoprazole     Prednisone     Talwin [Pentazocine]        Problem List:    Patient Active Problem List   Diagnosis Code    IBS (irritable bowel syndrome) K58.9    COPD (chronic Body mass index is 23.62 kg/m². CBC:   Lab Results   Component Value Date    WBC 5.6 07/29/2018    RBC 3.59 07/29/2018    HGB 10.7 07/29/2018    HCT 32.9 07/29/2018    MCV 91.6 07/29/2018    RDW 12.9 07/29/2018     07/29/2018       CMP:   Lab Results   Component Value Date     07/29/2018    K 4.6 07/29/2018     07/29/2018    CO2 25 07/29/2018    BUN 13 07/29/2018    CREATININE 0.8 07/29/2018    GFRAA >60 07/29/2018    LABGLOM >60 07/29/2018    GLUCOSE 108 07/29/2018    PROT 5.7 09/25/2017    CALCIUM 9.0 07/29/2018    BILITOT 0.4 09/25/2017    ALKPHOS 68 09/25/2017    AST 11 09/25/2017    ALT 9 09/25/2017       POC Tests: No results for input(s): POCGLU, POCNA, POCK, POCCL, POCBUN, POCHEMO, POCHCT in the last 72 hours. Coags:   Lab Results   Component Value Date    PROTIME 12.1 09/06/2017    INR 1.1 09/06/2017    APTT 29.6 09/06/2017       HCG (If Applicable): No results found for: PREGTESTUR, PREGSERUM, HCG, HCGQUANT     ABGs: No results found for: PHART, PO2ART, MTC2WRA, CYQ7UFN, BEART, V3WEEMUO     Type & Screen (If Applicable):  No results found for: LABABO, [address]    Anesthesia Evaluation  Patient summary reviewed and Nursing notes reviewed no history of anesthetic complications:   Airway: Mallampati: II  TM distance: >3 FB   Neck ROM: full  Mouth opening: > = 3 FB Dental:    (+) upper dentures and lower dentures  Comment: 4 implants on the bottom    Pulmonary: breath sounds clear to auscultation  (+) COPD:                            ROS comment: Former smoker   Cardiovascular:    (+) hyperlipidemia        Rhythm: regular  Rate: normal                    Neuro/Psych:   (+) neuromuscular disease ( myasthenia gravis):, TIA, headaches: migraine headaches,              ROS comment: Back pain  Neuropathy  [place]  kyphoplasty GI/Hepatic/Renal:   (+) GERD:,          ROS comment: IBS.    Endo/Other:    (+) hypothyroidism, blood dyscrasia: anticoagulation therapy, arthritis:., available for comparison. TECHNIQUE: Magnetic resonance angiography of the brain was performed   without intravenous contrast using a 3-D time-of-flight sequence. Rotating maximum intensity projection (MIP) images were generated from   the axial source images. FINDINGS:     The intracranial internal carotid arteries, middle cerebral arteries,   and anterior cerebral arteries are patent bilaterally, without   evidence of hemodynamically significant stenosis. The intracranial bilateral vertebral arteries, basilar artery, and   bilateral posterior cerebral arteries are patent, without evidence of   hemodynamically significant stenosis. The left vertebral artery is   dominant. No intracranial aneurysm is seen. There appears to be a nodular mass in the left posterior fossa   measuring approximately 1 cm in diameter, isointense to the adjacent   gray matter, and described more fully on the contemporaneously   performed MRI of the brain. Impression   1. No hemodynamically significant stenosis. 2. No intracranial aneurysm. Note that MRA is relatively insensitive   for small aneurysms. 3. Small, approximately 1-cm mass in the left posterior fossa, better   characterized and described more fully on the contemporaneous MRI of   the brain. 5/2/2018 6:30 PM   EXAM: MRA NECK WO CONTRAST   NUMBER OF IMAGES:  240   INDICATION: Stroke symptoms. Headache. COMPARISON: No prior study is available for comparison. TECHNIQUE: Magnetic resonance angiography of the neck was performed   using an axial 2-D time-of-flight pulse sequence without intravenous   contrast. Rotating maximum intensity projection (MIP) images were   generated. FINDINGS:     The study is somewhat degraded by motion artifact, particularly at the   level of the aortic arch and origins of the great vessels, which   cannot be adequately evaluated.        The bilateral common, internal, infarct, intracranial hemorrhage, or major mass   effect. 2. Small nodular mass in the posterior fossa, approximately 11 mm in   diameter. Differential considerations include meningioma and   epidermoid cyst.       3. Moderate cerebral atrophy. 7/29/2018 8:00 AM       EXAM: MRI LUMBAR SPINE WO CONTRAST       TECHNIQUE: Multiplanar, multi-sequential MRI lumbar spine without   contrast. 114 images. INDICATION: Patient is a 5[de-identified]year-old woman with history of back pain   and compression fracture       COMPARISON: MRI of the lumbar spine August 9, 2017       FINDINGS: Mild levocurvature of the lumbar spine. Chronic compression   fracture of L1 with approximately 30-40% vertebral height loss   centrally, no retropulsion of the posterior wall. Mild chronic compression deformity of the superior endplate of L4 with   approximately 5-10% vertebral height loss. Vertebroplasty at L2. Diffuse bone marrow signal changes representing   underlying osteopenia. No spondylolisthesis. Thecal sac ends at S2. Conus medullaris ends at L1. Cauda equina is unremarkable. For the findings of the lower thoracic spine please refer to MRI   thoracic spine report on the same date. T12-L1: Diffuse disc desiccation. No disc bulges or herniations. No   spinal stenosis or foraminal narrowing. L1-L2: Diffuse disc desiccation. Minimal diffuse disc bulge. No   central canal stenosis. Mild foraminal narrowing bilaterally. L2-L3: Diffuse disc desiccation. Diffuse disc bulge associated with   hypertrophy of the ligamentum flavum and facet joint arthropathy. There is mild central canal and lateral recesses narrowing. Moderate   foraminal narrowing bilaterally. L3-L4: Moderate diffuse disc height loss and desiccation. Degenerative   endplates irregularities are present. Mild diffuse disc bulge   associated with hypertrophy of the ligamentum flavum and facet joint   arthropathy.  Mild to

## 2018-07-31 NOTE — PROGRESS NOTES
declined    Patient education  Pt was educated on therapy participation    Patient response to education:   Pt verbalized understanding Pt demonstrated skill Pt requires further education in this area   x x x     Additional Comments: Upon arrival pt was using BR commode. Once pt finished she stood at sink. Pt declined additional ambulation or therex reporting extreme pain and kypho scheduled for today. Pt was encouraged to sit in bedside chair at this time as well and also declined. Pt returned to bed in supine. Maintained spinal precautions. Pt was left supine with call light left by patient. Time in: 0744  Time out: 0752    Pt is making good progress toward established Physical Therapy goals. Continue with physical therapy current plan of care.     Arlnee Blanton PTA  License Number: [id number]

## 2018-08-01 ENCOUNTER — APPOINTMENT (OUTPATIENT)
Dept: GENERAL RADIOLOGY | Age: 83
DRG: 479 | End: 2018-08-01
Payer: MEDICARE

## 2018-08-01 VITALS — DIASTOLIC BLOOD PRESSURE: 53 MMHG | OXYGEN SATURATION: 100 % | SYSTOLIC BLOOD PRESSURE: 105 MMHG

## 2018-08-01 LAB
INR BLD: 1.1
PROTHROMBIN TIME: 13.1 SEC (ref 9.3–12.4)

## 2018-08-01 PROCEDURE — 3700000001 HC ADD 15 MINUTES (ANESTHESIA): Performed by: ORTHOPAEDIC SURGERY

## 2018-08-01 PROCEDURE — 3600000004 HC SURGERY LEVEL 4 BASE: Performed by: ORTHOPAEDIC SURGERY

## 2018-08-01 PROCEDURE — 2500000003 HC RX 250 WO HCPCS: Performed by: ORTHOPAEDIC SURGERY

## 2018-08-01 PROCEDURE — 7100000001 HC PACU RECOVERY - ADDTL 15 MIN: Performed by: ORTHOPAEDIC SURGERY

## 2018-08-01 PROCEDURE — 2580000003 HC RX 258: Performed by: INTERNAL MEDICINE

## 2018-08-01 PROCEDURE — 2720000010 HC SURG SUPPLY STERILE: Performed by: ORTHOPAEDIC SURGERY

## 2018-08-01 PROCEDURE — C1713 ANCHOR/SCREW BN/BN,TIS/BN: HCPCS | Performed by: ORTHOPAEDIC SURGERY

## 2018-08-01 PROCEDURE — 36415 COLL VENOUS BLD VENIPUNCTURE: CPT

## 2018-08-01 PROCEDURE — 6360000002 HC RX W HCPCS: Performed by: STUDENT IN AN ORGANIZED HEALTH CARE EDUCATION/TRAINING PROGRAM

## 2018-08-01 PROCEDURE — 2709999900 HC NON-CHARGEABLE SUPPLY: Performed by: ORTHOPAEDIC SURGERY

## 2018-08-01 PROCEDURE — 85610 PROTHROMBIN TIME: CPT

## 2018-08-01 PROCEDURE — 6360000002 HC RX W HCPCS

## 2018-08-01 PROCEDURE — 3600000014 HC SURGERY LEVEL 4 ADDTL 15MIN: Performed by: ORTHOPAEDIC SURGERY

## 2018-08-01 PROCEDURE — 71045 X-RAY EXAM CHEST 1 VIEW: CPT

## 2018-08-01 PROCEDURE — 1200000000 HC SEMI PRIVATE

## 2018-08-01 PROCEDURE — 3700000000 HC ANESTHESIA ATTENDED CARE: Performed by: ORTHOPAEDIC SURGERY

## 2018-08-01 PROCEDURE — 3209999900 FLUORO FOR SURGICAL PROCEDURES

## 2018-08-01 PROCEDURE — 2580000003 HC RX 258

## 2018-08-01 PROCEDURE — 6370000000 HC RX 637 (ALT 250 FOR IP): Performed by: INTERNAL MEDICINE

## 2018-08-01 PROCEDURE — 7100000000 HC PACU RECOVERY - FIRST 15 MIN: Performed by: ORTHOPAEDIC SURGERY

## 2018-08-01 DEVICE — BONE CEMENT C01B HV-R WITH MIXER  US
Type: IMPLANTABLE DEVICE | Site: BACK | Status: FUNCTIONAL
Brand: KYPHON® HV-R® BONE CEMENT AND KYPHON® MIXER PACK

## 2018-08-01 RX ORDER — MEPERIDINE HYDROCHLORIDE 50 MG/ML
12.5 INJECTION INTRAMUSCULAR; INTRAVENOUS; SUBCUTANEOUS EVERY 5 MIN PRN
Status: DISCONTINUED | OUTPATIENT
Start: 2018-08-01 | End: 2018-08-01 | Stop reason: HOSPADM

## 2018-08-01 RX ORDER — PROMETHAZINE HYDROCHLORIDE 25 MG/ML
6.25 INJECTION, SOLUTION INTRAMUSCULAR; INTRAVENOUS
Status: DISCONTINUED | OUTPATIENT
Start: 2018-08-01 | End: 2018-08-01 | Stop reason: HOSPADM

## 2018-08-01 RX ORDER — HYDRALAZINE HYDROCHLORIDE 20 MG/ML
5 INJECTION INTRAMUSCULAR; INTRAVENOUS EVERY 10 MIN PRN
Status: DISCONTINUED | OUTPATIENT
Start: 2018-08-01 | End: 2018-08-01 | Stop reason: HOSPADM

## 2018-08-01 RX ORDER — BUPIVACAINE HYDROCHLORIDE 5 MG/ML
INJECTION, SOLUTION EPIDURAL; INTRACAUDAL PRN
Status: DISCONTINUED | OUTPATIENT
Start: 2018-08-01 | End: 2018-08-01 | Stop reason: HOSPADM

## 2018-08-01 RX ORDER — PROPOFOL 10 MG/ML
INJECTION, EMULSION INTRAVENOUS CONTINUOUS PRN
Status: DISCONTINUED | OUTPATIENT
Start: 2018-08-01 | End: 2018-08-01 | Stop reason: SDUPTHER

## 2018-08-01 RX ORDER — FENTANYL CITRATE 50 UG/ML
50 INJECTION, SOLUTION INTRAMUSCULAR; INTRAVENOUS EVERY 5 MIN PRN
Status: DISCONTINUED | OUTPATIENT
Start: 2018-08-01 | End: 2018-08-01 | Stop reason: HOSPADM

## 2018-08-01 RX ORDER — TRAMADOL HYDROCHLORIDE 50 MG/1
50 TABLET ORAL EVERY 6 HOURS PRN
Qty: 40 TABLET | Refills: 0 | Status: SHIPPED | OUTPATIENT
Start: 2018-08-01 | End: 2018-08-08

## 2018-08-01 RX ORDER — ASPIRIN 81 MG/1
81 TABLET ORAL DAILY
Status: DISCONTINUED | OUTPATIENT
Start: 2018-08-02 | End: 2018-08-02 | Stop reason: HOSPADM

## 2018-08-01 RX ORDER — LABETALOL HYDROCHLORIDE 5 MG/ML
5 INJECTION, SOLUTION INTRAVENOUS EVERY 10 MIN PRN
Status: DISCONTINUED | OUTPATIENT
Start: 2018-08-01 | End: 2018-08-01 | Stop reason: HOSPADM

## 2018-08-01 RX ORDER — LIDOCAINE HYDROCHLORIDE AND EPINEPHRINE 10; 10 MG/ML; UG/ML
INJECTION, SOLUTION INFILTRATION; PERINEURAL PRN
Status: DISCONTINUED | OUTPATIENT
Start: 2018-08-01 | End: 2018-08-01 | Stop reason: HOSPADM

## 2018-08-01 RX ORDER — SODIUM CHLORIDE 9 MG/ML
INJECTION, SOLUTION INTRAVENOUS CONTINUOUS PRN
Status: DISCONTINUED | OUTPATIENT
Start: 2018-08-01 | End: 2018-08-01 | Stop reason: SDUPTHER

## 2018-08-01 RX ORDER — FENTANYL CITRATE 50 UG/ML
INJECTION, SOLUTION INTRAMUSCULAR; INTRAVENOUS PRN
Status: DISCONTINUED | OUTPATIENT
Start: 2018-08-01 | End: 2018-08-01 | Stop reason: SDUPTHER

## 2018-08-01 RX ORDER — FENTANYL CITRATE 50 UG/ML
25 INJECTION, SOLUTION INTRAMUSCULAR; INTRAVENOUS EVERY 5 MIN PRN
Status: DISCONTINUED | OUTPATIENT
Start: 2018-08-01 | End: 2018-08-01 | Stop reason: HOSPADM

## 2018-08-01 RX ORDER — CEFAZOLIN SODIUM 1 G/3ML
INJECTION, POWDER, FOR SOLUTION INTRAMUSCULAR; INTRAVENOUS PRN
Status: DISCONTINUED | OUTPATIENT
Start: 2018-08-01 | End: 2018-08-01 | Stop reason: SDUPTHER

## 2018-08-01 RX ADMIN — MORPHINE SULFATE 2 MG: 2 INJECTION, SOLUTION INTRAMUSCULAR; INTRAVENOUS at 15:10

## 2018-08-01 RX ADMIN — FENTANYL CITRATE 50 MCG: 50 INJECTION, SOLUTION INTRAMUSCULAR; INTRAVENOUS at 18:31

## 2018-08-01 RX ADMIN — PHENYLEPHRINE HYDROCHLORIDE 100 MCG: 10 INJECTION INTRAVENOUS at 18:50

## 2018-08-01 RX ADMIN — FENTANYL CITRATE 25 MCG: 50 INJECTION, SOLUTION INTRAMUSCULAR; INTRAVENOUS at 18:40

## 2018-08-01 RX ADMIN — MORPHINE SULFATE 2 MG: 2 INJECTION, SOLUTION INTRAMUSCULAR; INTRAVENOUS at 02:09

## 2018-08-01 RX ADMIN — BECLOMETHASONE DIPROPIONATE 2 PUFF: 80 AEROSOL, METERED RESPIRATORY (INHALATION) at 10:19

## 2018-08-01 RX ADMIN — Medication 10 ML: at 02:09

## 2018-08-01 RX ADMIN — Medication 10 ML: at 06:19

## 2018-08-01 RX ADMIN — POLYETHYLENE GLYCOL 3350 17 G: 17 POWDER, FOR SOLUTION ORAL at 10:18

## 2018-08-01 RX ADMIN — MORPHINE SULFATE 2 MG: 2 INJECTION, SOLUTION INTRAMUSCULAR; INTRAVENOUS at 10:18

## 2018-08-01 RX ADMIN — Medication 10 ML: at 10:17

## 2018-08-01 RX ADMIN — PROPOFOL 60 MCG/KG/MIN: 10 INJECTION, EMULSION INTRAVENOUS at 18:34

## 2018-08-01 RX ADMIN — CELECOXIB 200 MG: 100 CAPSULE ORAL at 10:16

## 2018-08-01 RX ADMIN — MORPHINE SULFATE 2 MG: 2 INJECTION, SOLUTION INTRAMUSCULAR; INTRAVENOUS at 06:18

## 2018-08-01 RX ADMIN — Medication 10 ML: at 22:10

## 2018-08-01 RX ADMIN — SODIUM CHLORIDE: 9 INJECTION, SOLUTION INTRAVENOUS at 18:33

## 2018-08-01 RX ADMIN — CEFAZOLIN 1000 MG: 1 INJECTION, POWDER, FOR SOLUTION INTRAVENOUS at 18:38

## 2018-08-01 RX ADMIN — PHENYLEPHRINE HYDROCHLORIDE 100 MCG: 10 INJECTION INTRAVENOUS at 18:44

## 2018-08-01 RX ADMIN — MORPHINE SULFATE 2 MG: 2 INJECTION, SOLUTION INTRAMUSCULAR; INTRAVENOUS at 20:55

## 2018-08-01 ASSESSMENT — PAIN DESCRIPTION - PAIN TYPE
TYPE: ACUTE PAIN
TYPE: SURGICAL PAIN
TYPE: ACUTE PAIN

## 2018-08-01 ASSESSMENT — PAIN SCALES - GENERAL
PAINLEVEL_OUTOF10: 7
PAINLEVEL_OUTOF10: 7
PAINLEVEL_OUTOF10: 0
PAINLEVEL_OUTOF10: 4
PAINLEVEL_OUTOF10: 7
PAINLEVEL_OUTOF10: 0
PAINLEVEL_OUTOF10: 0
PAINLEVEL_OUTOF10: 3
PAINLEVEL_OUTOF10: 8
PAINLEVEL_OUTOF10: 0
PAINLEVEL_OUTOF10: 0
PAINLEVEL_OUTOF10: 8

## 2018-08-01 ASSESSMENT — PULMONARY FUNCTION TESTS
PIF_VALUE: 1
PIF_VALUE: 0
PIF_VALUE: 1
PIF_VALUE: 0
PIF_VALUE: 1
PIF_VALUE: 0
PIF_VALUE: 1
PIF_VALUE: 0
PIF_VALUE: 1
PIF_VALUE: 0
PIF_VALUE: 0
PIF_VALUE: 1
PIF_VALUE: 0
PIF_VALUE: 1
PIF_VALUE: 1
PIF_VALUE: 0
PIF_VALUE: 1
PIF_VALUE: 0
PIF_VALUE: 1
PIF_VALUE: 1

## 2018-08-01 ASSESSMENT — PAIN DESCRIPTION - LOCATION
LOCATION: BACK

## 2018-08-01 ASSESSMENT — PAIN DESCRIPTION - DESCRIPTORS
DESCRIPTORS: ACHING;DISCOMFORT;SORE
DESCRIPTORS: ACHING;DISCOMFORT

## 2018-08-01 ASSESSMENT — PAIN DESCRIPTION - ORIENTATION
ORIENTATION: LOWER
ORIENTATION: LOWER

## 2018-08-01 NOTE — PROGRESS NOTES
Surgery called and dr Nettie Lord added patient on for surgery on tomorrow. She will be an add on case .  Dr Agata Junior left message through answering service

## 2018-08-01 NOTE — PROGRESS NOTES
Spoke with patient and daughter @ bedside regarding the events of yesterday and this am.  Both were agreeable of plan of surgery today with Dr. Sherry Fontaine to be done here @ [facility]. Will attempt to follow and await return call back. [facility] with Debbie Goel in Dr. Mohan Betts office regarding plan for or today. Left return call back. [phone] left on Dr. Sherry Fontaine cellphone regarding the above.

## 2018-08-01 NOTE — PROGRESS NOTES
Discussed with patient , patient does not want dr Yamilex Romero doing surgery on her, she stAted I FIRED HIM EARLIER.  Surgery notified

## 2018-08-02 VITALS
WEIGHT: 125 LBS | HEART RATE: 67 BPM | SYSTOLIC BLOOD PRESSURE: 96 MMHG | DIASTOLIC BLOOD PRESSURE: 57 MMHG | TEMPERATURE: 98.1 F | HEIGHT: 61 IN | RESPIRATION RATE: 14 BRPM | BODY MASS INDEX: 23.6 KG/M2 | OXYGEN SATURATION: 98 %

## 2018-08-02 PROCEDURE — 0PS43ZZ REPOSITION THORACIC VERTEBRA, PERCUTANEOUS APPROACH: ICD-10-PCS | Performed by: ORTHOPAEDIC SURGERY

## 2018-08-02 PROCEDURE — 0PU43JZ SUPPLEMENT THORACIC VERTEBRA WITH SYNTHETIC SUBSTITUTE, PERCUTANEOUS APPROACH: ICD-10-PCS | Performed by: ORTHOPAEDIC SURGERY

## 2018-08-02 PROCEDURE — 97168 OT RE-EVAL EST PLAN CARE: CPT

## 2018-08-02 PROCEDURE — G8988 SELF CARE GOAL STATUS: HCPCS

## 2018-08-02 PROCEDURE — 2580000003 HC RX 258: Performed by: INTERNAL MEDICINE

## 2018-08-02 PROCEDURE — 6370000000 HC RX 637 (ALT 250 FOR IP): Performed by: INTERNAL MEDICINE

## 2018-08-02 PROCEDURE — 0PB43ZX EXCISION OF THORACIC VERTEBRA, PERCUTANEOUS APPROACH, DIAGNOSTIC: ICD-10-PCS | Performed by: ORTHOPAEDIC SURGERY

## 2018-08-02 PROCEDURE — 97164 PT RE-EVAL EST PLAN CARE: CPT

## 2018-08-02 PROCEDURE — 6360000002 HC RX W HCPCS: Performed by: STUDENT IN AN ORGANIZED HEALTH CARE EDUCATION/TRAINING PROGRAM

## 2018-08-02 PROCEDURE — 6370000000 HC RX 637 (ALT 250 FOR IP): Performed by: ORTHOPAEDIC SURGERY

## 2018-08-02 PROCEDURE — G8979 MOBILITY GOAL STATUS: HCPCS

## 2018-08-02 PROCEDURE — 2700000000 HC OXYGEN THERAPY PER DAY

## 2018-08-02 PROCEDURE — G8978 MOBILITY CURRENT STATUS: HCPCS

## 2018-08-02 PROCEDURE — G8987 SELF CARE CURRENT STATUS: HCPCS

## 2018-08-02 RX ADMIN — Medication 10 ML: at 10:09

## 2018-08-02 RX ADMIN — ASPIRIN 81 MG: 81 TABLET, COATED ORAL at 10:08

## 2018-08-02 RX ADMIN — POLYETHYLENE GLYCOL 3350 17 G: 17 POWDER, FOR SOLUTION ORAL at 10:08

## 2018-08-02 RX ADMIN — LEVOTHYROXINE SODIUM 50 MCG: 50 TABLET ORAL at 06:36

## 2018-08-02 RX ADMIN — CELECOXIB 200 MG: 100 CAPSULE ORAL at 10:08

## 2018-08-02 RX ADMIN — MORPHINE SULFATE 2 MG: 2 INJECTION, SOLUTION INTRAMUSCULAR; INTRAVENOUS at 10:09

## 2018-08-02 RX ADMIN — MORPHINE SULFATE 2 MG: 2 INJECTION, SOLUTION INTRAMUSCULAR; INTRAVENOUS at 05:03

## 2018-08-02 RX ADMIN — BECLOMETHASONE DIPROPIONATE 2 PUFF: 80 AEROSOL, METERED RESPIRATORY (INHALATION) at 10:08

## 2018-08-02 RX ADMIN — MORPHINE SULFATE 2 MG: 2 INJECTION, SOLUTION INTRAMUSCULAR; INTRAVENOUS at 14:34

## 2018-08-02 RX ADMIN — DEXLANSOPRAZOLE 60 MG: 60 CAPSULE, DELAYED RELEASE ORAL at 06:36

## 2018-08-02 ASSESSMENT — PAIN SCALES - GENERAL
PAINLEVEL_OUTOF10: 3
PAINLEVEL_OUTOF10: 7
PAINLEVEL_OUTOF10: 4

## 2018-08-02 ASSESSMENT — PAIN DESCRIPTION - LOCATION: LOCATION: BACK

## 2018-08-02 ASSESSMENT — PAIN DESCRIPTION - DESCRIPTORS: DESCRIPTORS: ACHING;DISCOMFORT

## 2018-08-02 ASSESSMENT — PAIN DESCRIPTION - PAIN TYPE: TYPE: SURGICAL PAIN

## 2018-08-02 NOTE — PROGRESS NOTES
OCCUPATIONAL THERAPY RE-EVAL    Date:2018  Patient Name: Magali Heller  MRN: [id number]  : 1927  Room: [address] [place], [place] [address]A     Re-Evaluating OT: Mariana Black OTR/L   License #  [id number]    Recommended Adaptive Equipment: possible A.E. For LB ADLs     AM PAC ADL RAW SCORE -  19/24  G code: CK    Diagnosis: Acute T11 compression fx. Surgery: s/p KYPHOPLASTY  T11  on 18   Past Medical History:       Diagnosis Date    Acid reflux disease     Arthritis     Chronic back pain     [facility]     Hearing loss     Hyperlipidemia     Irritable bowel     Migraine     Neuropathy ([facility]     Osteoarthritis     Osteoporosis     Thyroid disease     hypo     Precautions: falls, neutral spine, no lifting greater than 10 lbs. Pt was agreeable to eval/treatment this day: yes    Home Living: Live alone in 1  [place] home with ramp entry, B&B on ., tub/shower on  with HH shower & HR commode. Owns shower seat, w/w, w/c, st. cane    Prior Level of Function: Ind. with ADLs, Min A from dtr. with IADLs; using st. cane for ambulation. Driving: NA  Occupation: enjoys cooking    Pain Level: pt c/o 8/10 R low back pain this session     Cognition: oriented x 4; follows 2 step directions. Sensory:   Hearing: wfl, wears R hearing aid  Vision: wfl    Glasses: yes [x] no [] reading []      UE Assessment:  Hand Dominance: Right [x]  Left []     Strength ROM Additional Info:    RUE   wfl/5 wfl wfl  and wfl FMC/dexterity noted during ADL tasks     LUE wfl/5 wfl wfl  and wfl FMC/dexterity noted during ADL tasks   Sensation: wfl B UEs  Tone: wfl B UEs  Edema: none noted B UEs    Functional Assessment:   Initial Eval Status  Comments   Feeding  Ind. Grooming  Sup    Upper Body Dressing Sup     Lower Body Dressing Sup    Bathing Sup    Toileting  Sup with hygiene and clothing mgmt.     Bed Mobility  Rolling: Sup  Supine to Sit: Sup  Sit to Supine: Sup    Functional

## 2018-08-02 NOTE — CARE COORDINATION
Social Work Discharge Planning:  SW met with patient regarding [address] choice, patient is declining [address]. SW will continue to follow.   Electronically signed by CIRO Miller on 8/2/2018 at 3:07 PM

## 2018-08-02 NOTE — PROGRESS NOTES
Physical Therapy  Re- evaluation post kyphoplasty    Name: Chinedu Chen  : 1927  MRN: [id number]    Date of Service: 2018    Evaluating PT:  Madiha Jara, PT [id number]    Room #:  5210/5210-A  Diagnosis:  Back pain  Surgery: 18 S/P T11 Kyphoplasty 18  PMHx:        Diagnosis Date    Acid reflux disease     Arthritis     Chronic back pain     COPD (chronic obstructive pulmonary disease) (HCC)     Hearing loss     Hyperlipidemia     Irritable bowel     Migraine     Neuropathy (HCC)     Osteoarthritis     Osteoporosis     Thyroid disease     hypo     Precautions: Activity as tolerated - No lifting > 10lbs, Spinal neutral mechanics  Equipment Needs:  none  States daughter will be able to assist upon D/C. Pt lives Ind in a 1 story home with ramped entrance to enter and no rail. Bed is on 1 floor and bath is on 1 floor. Pt ambulated with Ind PTA. Equipment owned: [facility], [address], w/c     Initial Evaluation  Date: 18 Treatment Short Term/ Long Term   Goals   AM-PAC 6 Clicks 75/64     Was pt agreeable to Eval/treatment? yes     Does pt have pain? 8/10 reports R side lumbar pain \"muscle\"     Bed Mobility  Rolling: Sup  Supine to sit: Sup  Sit to supine: Sup  Scooting: Sup  Rolling: Ind  Supine to sit: Ind  Sit to supine: Ind  Scooting: Ind   Transfers Sit to stand: sup  Stand to sit: Sup  Stand pivot: Sup  Sit to stand: Ind  Stand to sit: Ind  Stand pivot: Ind   Ambulation    150 feet with  fww SBA  >200 feet with  fww Mod Ind. Stair negotiation: ascended and descended  NT  Ramped home. ROM BUE:  WFL  BLE:  WFL     Strength BUE:  4/5  BLE:  4/5  4+/5   Balance Sitting EOB:  Ind  Dynamic Standing:  Sup  Sitting EOB:  Ind  Dynamic Standing:   Mod Ind     Pt is A & O x 3  Sensation:  Pt denies numbness and tingling to extremities  Edema:  none    Patient education  Pt educated on spinal neutral mechanics    Patient response to education:   Pt verbalized understanding Pt demonstrated skill Pt

## 2018-08-02 NOTE — ANESTHESIA POSTPROCEDURE EVALUATION
Department of Anesthesiology  Postprocedure Note    Patient: Mili Barriga  MRN: [id number]  YOB: 1927  Date of evaluation: 8/2/2018  Time:  10:02 AM     Procedure Summary     Date:  08/01/18 Room / Location:  [facility] OR 06 / [facility] OR    Anesthesia Start:  1822 Anesthesia Stop:  1910    Procedure:  KYPHOPLASTY  T11  -- TIME UNDETERMINED (N/A Back) Diagnosis:  (.)    Surgeon:  Robbie Hernandez DO Responsible Provider:  Iraida Pantoja MD    Anesthesia Type:  general ASA Status:  3          Anesthesia Type: general    Mariana Phase I: Mariana Score: 9    Mariana Phase II: Mariana Score: 10    Last vitals: Reviewed and per EMR flowsheets.        Anesthesia Post Evaluation    Patient location during evaluation: PACU  Patient participation: complete - patient participated  Level of consciousness: awake and alert  Airway patency: patent  Nausea & Vomiting: no nausea and no vomiting  Complications: no  Cardiovascular status: hemodynamically stable and blood pressure returned to baseline  Respiratory status: acceptable  Hydration status: euvolemic

## 2018-12-13 ENCOUNTER — OFFICE VISIT (OUTPATIENT)
Dept: CARDIOLOGY CLINIC | Age: 83
End: 2018-12-13
Payer: MEDICARE

## 2018-12-13 VITALS
SYSTOLIC BLOOD PRESSURE: 160 MMHG | HEIGHT: 61 IN | WEIGHT: 126.2 LBS | HEART RATE: 73 BPM | RESPIRATION RATE: 18 BRPM | DIASTOLIC BLOOD PRESSURE: 90 MMHG | BODY MASS INDEX: 23.83 KG/M2

## 2018-12-13 DIAGNOSIS — I38 VHD (VALVULAR HEART DISEASE): Primary | ICD-10-CM

## 2018-12-13 PROCEDURE — 99213 OFFICE O/P EST LOW 20 MIN: CPT | Performed by: INTERNAL MEDICINE

## 2018-12-13 PROCEDURE — 1090F PRES/ABSN URINE INCON ASSESS: CPT | Performed by: INTERNAL MEDICINE

## 2018-12-13 PROCEDURE — G8427 DOCREV CUR MEDS BY ELIG CLIN: HCPCS | Performed by: INTERNAL MEDICINE

## 2018-12-13 PROCEDURE — 93000 ELECTROCARDIOGRAM COMPLETE: CPT | Performed by: INTERNAL MEDICINE

## 2018-12-13 PROCEDURE — 1101F PT FALLS ASSESS-DOCD LE1/YR: CPT | Performed by: INTERNAL MEDICINE

## 2018-12-13 PROCEDURE — G8420 CALC BMI NORM PARAMETERS: HCPCS | Performed by: INTERNAL MEDICINE

## 2018-12-13 PROCEDURE — 1123F ACP DISCUSS/DSCN MKR DOCD: CPT | Performed by: INTERNAL MEDICINE

## 2018-12-13 PROCEDURE — 1036F TOBACCO NON-USER: CPT | Performed by: INTERNAL MEDICINE

## 2018-12-13 PROCEDURE — G8484 FLU IMMUNIZE NO ADMIN: HCPCS | Performed by: INTERNAL MEDICINE

## 2018-12-13 PROCEDURE — 4040F PNEUMOC VAC/ADMIN/RCVD: CPT | Performed by: INTERNAL MEDICINE

## 2018-12-13 RX ORDER — CHOLECALCIFEROL (VITAMIN D3) 125 MCG
2000 CAPSULE ORAL DAILY
COMMUNITY

## 2018-12-13 RX ORDER — TRAMADOL HYDROCHLORIDE 50 MG/1
50 TABLET ORAL EVERY 6 HOURS PRN
Status: ON HOLD | COMMUNITY
End: 2020-01-05 | Stop reason: ALTCHOICE

## 2019-05-06 DIAGNOSIS — D32.9 MENINGIOMA (HCC): Primary | ICD-10-CM

## 2019-05-08 DIAGNOSIS — Z01.812 PRE-PROCEDURE LAB EXAM: ICD-10-CM

## 2019-05-08 DIAGNOSIS — D32.9 MENINGIOMA (HCC): Primary | ICD-10-CM

## 2019-05-28 DIAGNOSIS — Z01.812 PRE-PROCEDURE LAB EXAM: ICD-10-CM

## 2019-05-28 DIAGNOSIS — D32.9 MENINGIOMA (HCC): ICD-10-CM

## 2019-05-29 ENCOUNTER — HOSPITAL ENCOUNTER (OUTPATIENT)
Dept: MRI IMAGING | Age: 84
Discharge: HOME | End: 2019-05-31
Payer: MEDICARE

## 2019-05-29 ENCOUNTER — ANESTHESIA EVENT (OUTPATIENT)
Dept: MRI IMAGING | Age: 84
End: 2019-05-29

## 2019-05-29 ENCOUNTER — ANESTHESIA (OUTPATIENT)
Dept: MRI IMAGING | Age: 84
End: 2019-05-29

## 2019-05-29 VITALS
RESPIRATION RATE: 16 BRPM | BODY MASS INDEX: 22.84 KG/M2 | WEIGHT: 121 LBS | HEIGHT: 61 IN | OXYGEN SATURATION: 96 % | TEMPERATURE: 98.6 F | HEART RATE: 68 BPM | DIASTOLIC BLOOD PRESSURE: 72 MMHG | SYSTOLIC BLOOD PRESSURE: 150 MMHG

## 2019-05-29 VITALS
RESPIRATION RATE: 7 BRPM | DIASTOLIC BLOOD PRESSURE: 56 MMHG | OXYGEN SATURATION: 97 % | SYSTOLIC BLOOD PRESSURE: 105 MMHG

## 2019-05-29 DIAGNOSIS — D32.9 MENINGIOMA (HCC): ICD-10-CM

## 2019-05-29 PROCEDURE — 7100000011 HC PHASE II RECOVERY - ADDTL 15 MIN

## 2019-05-29 PROCEDURE — 3700000001 HC ADD 15 MINUTES (ANESTHESIA)

## 2019-05-29 PROCEDURE — 36415 COLL VENOUS BLD VENIPUNCTURE: CPT

## 2019-05-29 PROCEDURE — 6360000002 HC RX W HCPCS: Performed by: NURSE ANESTHETIST, CERTIFIED REGISTERED

## 2019-05-29 PROCEDURE — 70553 MRI BRAIN STEM W/O & W/DYE: CPT

## 2019-05-29 PROCEDURE — A9579 GAD-BASE MR CONTRAST NOS,1ML: HCPCS | Performed by: RADIOLOGY

## 2019-05-29 PROCEDURE — 2580000003 HC RX 258: Performed by: NURSE ANESTHETIST, CERTIFIED REGISTERED

## 2019-05-29 PROCEDURE — 3700000000 HC ANESTHESIA ATTENDED CARE

## 2019-05-29 PROCEDURE — 7100000010 HC PHASE II RECOVERY - FIRST 15 MIN

## 2019-05-29 PROCEDURE — 6360000004 HC RX CONTRAST MEDICATION: Performed by: RADIOLOGY

## 2019-05-29 RX ORDER — PROPOFOL 10 MG/ML
INJECTION, EMULSION INTRAVENOUS CONTINUOUS PRN
Status: DISCONTINUED | OUTPATIENT
Start: 2019-05-29 | End: 2019-05-29 | Stop reason: SDUPTHER

## 2019-05-29 RX ORDER — SODIUM CHLORIDE 9 MG/ML
INJECTION, SOLUTION INTRAVENOUS CONTINUOUS PRN
Status: DISCONTINUED | OUTPATIENT
Start: 2019-05-29 | End: 2019-05-29 | Stop reason: SDUPTHER

## 2019-05-29 RX ORDER — HYDROCODONE BITARTRATE AND ACETAMINOPHEN 5; 325 MG/1; MG/1
2 TABLET ORAL PRN
Status: ACTIVE | OUTPATIENT
Start: 2019-05-29 | End: 2019-05-29

## 2019-05-29 RX ORDER — MEPERIDINE HYDROCHLORIDE 50 MG/ML
12.5 INJECTION INTRAMUSCULAR; INTRAVENOUS; SUBCUTANEOUS EVERY 5 MIN PRN
Status: DISCONTINUED | OUTPATIENT
Start: 2019-05-29 | End: 2019-06-01 | Stop reason: HOSPADM

## 2019-05-29 RX ORDER — MORPHINE SULFATE 2 MG/ML
1 INJECTION, SOLUTION INTRAMUSCULAR; INTRAVENOUS EVERY 5 MIN PRN
Status: DISCONTINUED | OUTPATIENT
Start: 2019-05-29 | End: 2019-06-01 | Stop reason: HOSPADM

## 2019-05-29 RX ORDER — PROMETHAZINE HYDROCHLORIDE 25 MG/ML
6.25 INJECTION, SOLUTION INTRAMUSCULAR; INTRAVENOUS EVERY 10 MIN PRN
Status: DISCONTINUED | OUTPATIENT
Start: 2019-05-29 | End: 2019-06-01 | Stop reason: HOSPADM

## 2019-05-29 RX ORDER — HYDROCODONE BITARTRATE AND ACETAMINOPHEN 5; 325 MG/1; MG/1
1 TABLET ORAL PRN
Status: ACTIVE | OUTPATIENT
Start: 2019-05-29 | End: 2019-05-29

## 2019-05-29 RX ORDER — MORPHINE SULFATE 2 MG/ML
2 INJECTION, SOLUTION INTRAMUSCULAR; INTRAVENOUS EVERY 5 MIN PRN
Status: DISCONTINUED | OUTPATIENT
Start: 2019-05-29 | End: 2019-06-01 | Stop reason: HOSPADM

## 2019-05-29 RX ADMIN — GADOTERIDOL 11 ML: 279.3 INJECTION, SOLUTION INTRAVENOUS at 09:50

## 2019-05-29 RX ADMIN — SODIUM CHLORIDE: 9 INJECTION, SOLUTION INTRAVENOUS at 08:59

## 2019-05-29 RX ADMIN — PROPOFOL 25 MCG/KG/MIN: 10 INJECTION, EMULSION INTRAVENOUS at 09:03

## 2019-05-29 ASSESSMENT — COPD QUESTIONNAIRES: CAT_SEVERITY: NO INTERVAL CHANGE

## 2019-05-29 NOTE — PROGRESS NOTES
0800 Received pt on cart in angio recovery  0820 20 g Introcan inserted LAC  0825 ODALIS Ramon CRNA visits and examines  0830 Hand off report given to PT RN  0945 Received pt via cart to angio.  Hand off report received from ODALIS Ramon CRNA  1718 Taking PO fluids  1020 LAC 20 g Introcan removed intact  1035 Home going instructions discussed withpt and niece, both verbalizes understanding  65 Departed via wheel chair to casr, in stable condition

## 2019-05-29 NOTE — ANESTHESIA PRE PROCEDURE
Department of Anesthesiology  Preprocedure Note       Name:  Emelia June   Age:  80 y.o.  :  1927                                          MRN:  [id number]         Date:  2019      Surgeon: * Surgery not found *    Procedure: MRI BRAIN W WO CONTRAST    Medications prior to admission:   Prior to Admission medications    Medication Sig Start Date End Date Taking? Authorizing Provider   celecoxib (CELEBREX) 200 MG capsule Take 200 mg by mouth 2 times daily   Yes Historical Provider, MD   Cholecalciferol (VITAMIN D3) 2000 units CAPS Take by mouth daily   Yes Historical Provider, MD   traMADol (ULTRAM) 50 MG tablet Take 50 mg by mouth every 6 hours as needed for Pain. .   Yes Historical Provider, MD   polyethylene glycol (GLYCOLAX) packet Take 17 g by mouth daily as needed for Constipation   Yes Historical Provider, MD   NONFORMULARY    Yes Historical Provider, MD   aspirin 81 MG EC tablet Take 1 tablet by mouth daily 17  Yes Stevo Mathew DO   acetaminophen (TYLENOL) 325 MG tablet Take 650 mg by mouth every 6 hours as needed for Pain   Yes Historical Provider, MD   SUMAtriptan (IMITREX) 100 MG tablet Take 100 mg by mouth once as needed for Migraine   Yes Historical Provider, MD   dexlansoprazole (DEXILANT) 60 MG CPDR capsule Take 60 mg by mouth daily Instructed to take am of procedure   Yes Historical Provider, MD   budesonide (PULMICORT) 180 MCG/ACT AEPB inhaler Inhale 2 puffs into the lungs 2 times daily Use am of 10/31/16   Yes Historical Provider, MD   levothyroxine (SYNTHROID) 50 MCG tablet Take 50 mcg by mouth Six times weekly    Yes Historical Provider, MD       Current medications:    Current Outpatient Medications   Medication Sig Dispense Refill    celecoxib (CELEBREX) 200 MG capsule Take 200 mg by mouth 2 times daily      Cholecalciferol (VITAMIN D3) 2000 units CAPS Take by mouth daily      traMADol (ULTRAM) 50 MG tablet Take 50 mg by mouth every 6 hours as needed for Pain. Zelalem Cintron polyethylene glycol (GLYCOLAX) packet Take 17 g by mouth daily as needed for Constipation      NONFORMULARY       aspirin 81 MG EC tablet Take 1 tablet by mouth daily 30 tablet 3    acetaminophen (TYLENOL) 325 MG tablet Take 650 mg by mouth every 6 hours as needed for Pain      SUMAtriptan (IMITREX) 100 MG tablet Take 100 mg by mouth once as needed for Migraine      dexlansoprazole (DEXILANT) 60 MG CPDR capsule Take 60 mg by mouth daily Instructed to take am of procedure      budesonide (PULMICORT) 180 MCG/ACT AEPB inhaler Inhale 2 puffs into the lungs 2 times daily Use am of 10/31/16      levothyroxine (SYNTHROID) 50 MCG tablet Take 50 mcg by mouth Six times weekly        No current facility-administered medications for this encounter. 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    Meningioma ([facility]) D32.9    Acquired hypothyroidism E03.9    Back pain M54.9       Past Medical History:        Diagnosis Date    Acid reflux disease     Arthritis     Chronic back pain     COPD (chronic obstructive pulmonary disease) ([facility])     Hearing loss     Hyperlipidemia     Irritable bowel     Migraine     Neuropathy     Osteoarthritis     Osteoporosis     Thyroid disease     hypo    TIA (transient ischemic attack) 05/2018       Past Surgical History:        Procedure Laterality Date    ANKLE SURGERY  1980    pin placement    APPENDECTOMY      BLEPHAROPLASTY      BREAST BIOPSY  1978    BREAST SURGERY      biopsy bilat     CHOLECYSTECTOMY      COLONOSCOPY      ENDOSCOPY, COLON, DIAGNOSTIC  8/2012    ERCP 10/31/2012    ERCP  2012    stent removal    EYE SURGERY      cataract    FIXATION KYPHOPLASTY  2016    LUMBAR 2    FRACTURE SURGERY      left ankle, pin placement    HYSTERECTOMY      MT OFFICE/OUTPT VISIT,PROCEDURE ONLY N/A 2018    KYPHOPLASTY  T11  -- TIME UNDETERMINED performed by Varghese Garcia DO at [facility]      biopsy    TONSILLECTOMY         Social History:    Social History     Tobacco Use    Smoking status: Former Smoker     Last attempt to quit: 1996     Years since quittin.3    Smokeless tobacco: Never Used   Substance Use Topics    Alcohol use: No                                Counseling given: Not Answered      Vital Signs (Current):   Vitals:    19 0805   BP: (!) 169/71   Pulse: 71   Resp: 18   Temp: 37.1 °C (98.7 °F)   TempSrc: Oral   SpO2: 97%   Weight: 121 lb (54.9 kg)   Height: 5' 1\" (1.549 m)                                              BP Readings from Last 3 Encounters:   19 (!) 169/71   19 (!) 140/76   18 (!) 160/90       NPO Status:                                                                                 BMI:   Wt Readings from Last 3 Encounters:   19 121 lb (54.9 kg)   19 124 lb (56.2 kg)   18 126 lb 3.2 oz (57.2 kg)     Body mass index is 22.86 kg/m².     CBC:   Lab Results   Component Value Date    WBC 5.6 2018    RBC 3.59 2018    HGB 10.7 2018    HCT 32.9 2018    MCV 91.6 2018    RDW 12.9 2018     2018       CMP:   Lab Results   Component Value Date     2018    K 4.6 2018     2018    CO2 25 2018    BUN 13 2018    CREATININE 0.8 2018    GFRAA >60 2018    LABGLOM >60 2018    GLUCOSE 108 2018    PROT 5.7 2017    CALCIUM 9.0 2018    BILITOT 0.4 2017    ALKPHOS 68 2017    AST 11 2017    ALT 9 2017       POC Tests: No results for input(s): POCGLU, POCNA, POCK, POCCL, POCBUN, POCHEMO, POCHCT in the last 72 hours. Coags:   Lab Results   Component Value Date    PROTIME 13.1 08/01/2018    INR 1.1 08/01/2018    APTT 29.6 09/06/2017       HCG (If Applicable): No results found for: PREGTESTUR, PREGSERUM, HCG, HCGQUANT     ABGs: No results found for: PHART, PO2ART, DOI6SPV, KKG8DJI, BEART, V1FIWNNE     Type & Screen (If Applicable):  No results found for: LABABO, [address]    Anesthesia Evaluation  Patient summary reviewed and Nursing notes reviewed  Airway: Mallampati: I  TM distance: >3 FB   Neck ROM: limited  Mouth opening: > = 3 FB Dental:    (+) edentulous      Pulmonary: breath sounds clear to auscultation  (+) COPD: no interval change,                             Cardiovascular:  Exercise tolerance: no interval change,           Rhythm: regular  Rate: normal                    Neuro/Psych:   (+) neuromuscular disease:, headaches:,             GI/Hepatic/Renal:   (+) GERD: no interval change,           Endo/Other:    (+) hypothyroidism::., .                 Abdominal:           Vascular:                                        Anesthesia Plan      MAC     ASA 3             Anesthetic plan and risks discussed with patient. Plan discussed with CRNA and attending. Attending anesthesiologist reviewed and agrees with 110 NISHANT Moran - CRNA   5/29/2019        Pt seen, examined, chart reviewed, plan discussed.   Xiomara Hubbard  5/29/2019  9:22 AM

## 2019-06-02 ENCOUNTER — HOSPITAL ENCOUNTER (EMERGENCY)
Age: 84
Discharge: HOME | End: 2019-06-02
Attending: EMERGENCY MEDICINE
Payer: MEDICARE

## 2019-06-02 ENCOUNTER — APPOINTMENT (OUTPATIENT)
Dept: ULTRASOUND IMAGING | Age: 84
End: 2019-06-02
Payer: MEDICARE

## 2019-06-02 ENCOUNTER — APPOINTMENT (OUTPATIENT)
Dept: GENERAL RADIOLOGY | Age: 84
End: 2019-06-02
Payer: MEDICARE

## 2019-06-02 VITALS
BODY MASS INDEX: 22.84 KG/M2 | OXYGEN SATURATION: 96 % | TEMPERATURE: 97.4 F | DIASTOLIC BLOOD PRESSURE: 85 MMHG | WEIGHT: 121 LBS | SYSTOLIC BLOOD PRESSURE: 159 MMHG | HEART RATE: 75 BPM | RESPIRATION RATE: 16 BRPM | HEIGHT: 61 IN

## 2019-06-02 DIAGNOSIS — S80.12XA CONTUSION OF LEFT LOWER EXTREMITY, INITIAL ENCOUNTER: Primary | ICD-10-CM

## 2019-06-02 PROCEDURE — 73610 X-RAY EXAM OF ANKLE: CPT

## 2019-06-02 PROCEDURE — 93971 EXTREMITY STUDY: CPT

## 2019-06-02 PROCEDURE — 99283 EMERGENCY DEPT VISIT LOW MDM: CPT

## 2019-06-02 ASSESSMENT — ENCOUNTER SYMPTOMS
WHEEZING: 0
EYE REDNESS: 0
COUGH: 0
BACK PAIN: 0
NAUSEA: 0
SHORTNESS OF BREATH: 0
SORE THROAT: 0
EYE PAIN: 0
DIARRHEA: 0
ABDOMINAL DISTENTION: 0
EYE DISCHARGE: 0
VOMITING: 0
SINUS PRESSURE: 0

## 2019-06-02 ASSESSMENT — PAIN SCALES - GENERAL: PAINLEVEL_OUTOF10: 6

## 2019-06-02 ASSESSMENT — PAIN SCALES - WONG BAKER: WONGBAKER_NUMERICALRESPONSE: 2

## 2019-06-02 ASSESSMENT — PAIN DESCRIPTION - ONSET: ONSET: ON-GOING

## 2019-06-02 ASSESSMENT — PAIN DESCRIPTION - PAIN TYPE: TYPE: ACUTE PAIN

## 2019-06-02 ASSESSMENT — PAIN DESCRIPTION - LOCATION: LOCATION: ANKLE;LEG

## 2019-06-02 ASSESSMENT — PAIN DESCRIPTION - DESCRIPTORS: DESCRIPTORS: DISCOMFORT

## 2019-06-02 ASSESSMENT — PAIN DESCRIPTION - PROGRESSION: CLINICAL_PROGRESSION: GRADUALLY WORSENING

## 2019-06-02 ASSESSMENT — PAIN DESCRIPTION - FREQUENCY: FREQUENCY: INTERMITTENT

## 2019-06-02 NOTE — ED PROVIDER NOTES
59-year-old male presenting with left shin and calf pain after sustaining an injury yesterday. She states that the injury occurred when a can a frozen spaghetti sauce landed on her leg. She states she is ambulatory and does have some crampy pain in the back half. His primary concern about fracture of her shin as well as possible DVT. Patient has no prior history of DVT but does not take any anticoagulants. Denies any shortness of breath, syncope or near syncopal events, diaphoresis, nausea. The history is provided by the patient. Leg Injury   Location:  Leg  Injury: yes    Leg location:  L leg  Pain details:     Severity:  Moderate  Chronicity:  New  Dislocation: no    Foreign body present:  No foreign bodies  Tetanus status:  Unknown  Prior injury to area:  No  Associated symptoms: no back pain and no fever        Review of Systems   Constitutional: Negative for chills and fever. HENT: Negative for ear pain, sinus pressure and sore throat. Eyes: Negative for pain, discharge and redness. Respiratory: Negative for cough, shortness of breath and wheezing. Cardiovascular: Negative for chest pain. Gastrointestinal: Negative for abdominal distention, diarrhea, nausea and vomiting. Genitourinary: Negative for dysuria and frequency. Musculoskeletal: Negative for arthralgias and back pain. Left shin, calf pain     Skin: Negative for rash and wound. Neurological: Negative for weakness and headaches. Hematological: Negative for adenopathy. All other systems reviewed and are negative. Physical Exam   Constitutional: She is oriented to person, place, and time. She appears well-developed and well-nourished. HENT:   Head: Normocephalic and atraumatic. Eyes: Pupils are equal, round, and reactive to light. Neck: Normal range of motion. Neck supple. Cardiovascular: Normal rate, regular rhythm and normal heart sounds. No murmur heard.   Pulmonary/Chest: Effort normal and breath sounds (8/2012); ERCP (10/31/2012); ERCP (12/05/2012); fracture surgery; eye surgery; Cholecystectomy; Fixation Kyphoplasty (08/03/2016); Breast surgery; Colonoscopy; blepharoplasty; Ankle surgery (1980); Breast biopsy (1978); and pr office/outpt visit,procedure only (N/A, 8/1/2018). Social History:  reports that she quit smoking about 23 years ago. She has never used smokeless tobacco. She reports that she does not drink alcohol or use drugs. Family History: family history is not on file. The patients home medications have been reviewed. Allergies: Amlodipine besylate; Bentyl [dicyclomine hcl]; Codeine; Mobic [meloxicam]; Other; Pantoprazole; Prednisone; and Talwin [pentazocine]    -------------------------------------------------- RESULTS -------------------------------------------------  Labs:  No results found for this visit on 06/02/19. Radiology:  US DUP LOWER EXTREMITY LEFT SALOMÓN   Final Result   PATENT DEEP VENOUS SYSTEM OF THE LEFT LOWER EXTREMITY. NO EVIDENCE FOR DVT. XR ANKLE LEFT (MIN 3 VIEWS)   Final Result      Diffuse osteopenia. No acute osseous abnormality. Healed fracture deformity of the lateral   malleolus. ------------------------- NURSING NOTES AND VITALS REVIEWED ---------------------------  Date / Time Roomed:  6/2/2019  8:14 AM  ED Bed Assignment:  17/17    The nursing notes within the ED encounter and vital signs as below have been reviewed. BP (!) 181/77   Pulse 84   Temp 97.4 °F (36.3 °C) (Oral)   Resp 16   Ht 5' 1\" (1.549 m)   Wt 121 lb (54.9 kg)   SpO2 97%   BMI 22.86 kg/m²   Oxygen Saturation Interpretation: Normal      ------------------------------------------ PROGRESS NOTES ------------------------------------------         10:31 AM  I have spoken with the patient and discussed todays results, in addition to providing specific details for the plan of care and counseling regarding the diagnosis and prognosis.   Their questions are answered at this time and they are agreeable with the plan. I discussed at length with them reasons for immediate return here for re evaluation. They will followup with their primary care physician by calling their office on Monday.      --------------------------------- ADDITIONAL PROVIDER NOTES ---------------------------------  At this time the patient is without objective evidence of an acute process requiring hospitalization or inpatient management. They have remained hemodynamically stable throughout their entire ED visit and are stable for discharge with outpatient follow-up. The plan has been discussed in detail and they are aware of the specific conditions for emergent return, as well as the importance of follow-up. New Prescriptions    No medications on file       Diagnosis:  1. Contusion of left lower extremity, initial encounter        Disposition:  Patient's disposition: Discharge to home  Patient's condition is stable. NOTE: This report was transcribed using voice recognition software. Every effort was made to ensure accuracy; however, inadvertent computerized transcription errors may be present.        Kelvin Christina,   Resident  06/02/19 [id number]

## 2019-06-05 ENCOUNTER — OFFICE VISIT (OUTPATIENT)
Dept: NEUROSURGERY | Age: 84
End: 2019-06-05
Payer: MEDICARE

## 2019-06-05 VITALS
HEIGHT: 61 IN | WEIGHT: 121 LBS | SYSTOLIC BLOOD PRESSURE: 153 MMHG | DIASTOLIC BLOOD PRESSURE: 74 MMHG | HEART RATE: 74 BPM | BODY MASS INDEX: 22.84 KG/M2

## 2019-06-05 DIAGNOSIS — D32.9 MENINGIOMA (HCC): Primary | ICD-10-CM

## 2019-06-05 PROCEDURE — G8420 CALC BMI NORM PARAMETERS: HCPCS | Performed by: NEUROLOGICAL SURGERY

## 2019-06-05 PROCEDURE — 1123F ACP DISCUSS/DSCN MKR DOCD: CPT | Performed by: NEUROLOGICAL SURGERY

## 2019-06-05 PROCEDURE — 1090F PRES/ABSN URINE INCON ASSESS: CPT | Performed by: NEUROLOGICAL SURGERY

## 2019-06-05 PROCEDURE — 4040F PNEUMOC VAC/ADMIN/RCVD: CPT | Performed by: NEUROLOGICAL SURGERY

## 2019-06-05 PROCEDURE — 99213 OFFICE O/P EST LOW 20 MIN: CPT | Performed by: NEUROLOGICAL SURGERY

## 2019-06-05 PROCEDURE — 1036F TOBACCO NON-USER: CPT | Performed by: NEUROLOGICAL SURGERY

## 2019-06-05 PROCEDURE — G8427 DOCREV CUR MEDS BY ELIG CLIN: HCPCS | Performed by: NEUROLOGICAL SURGERY

## 2019-06-05 NOTE — PROGRESS NOTES
Patient is here for follow up consult for: meningioma. She is doing well. Physical exam  Alert and Oriented X3  PERRLA, EOMI  PUTNAM 5/5  Sensation intact to LT and PP  Reflexes are 2+ and symmetric    A/P: patient is here for follow up for: left posterior fossa meningioma. She is doing okay and the tumor is unchanged. No intervention.   Follow up in 2 years with repeat MRI    Avinash Prem

## 2019-06-18 ENCOUNTER — OFFICE VISIT (OUTPATIENT)
Dept: CARDIOLOGY CLINIC | Age: 84
End: 2019-06-18
Payer: MEDICARE

## 2019-06-18 VITALS
RESPIRATION RATE: 16 BRPM | WEIGHT: 121.6 LBS | HEIGHT: 61 IN | SYSTOLIC BLOOD PRESSURE: 168 MMHG | DIASTOLIC BLOOD PRESSURE: 90 MMHG | BODY MASS INDEX: 22.96 KG/M2 | HEART RATE: 65 BPM

## 2019-06-18 DIAGNOSIS — I38 VHD (VALVULAR HEART DISEASE): Primary | ICD-10-CM

## 2019-06-18 PROCEDURE — 93000 ELECTROCARDIOGRAM COMPLETE: CPT | Performed by: INTERNAL MEDICINE

## 2019-06-18 PROCEDURE — 99213 OFFICE O/P EST LOW 20 MIN: CPT | Performed by: INTERNAL MEDICINE

## 2019-06-18 PROCEDURE — 1090F PRES/ABSN URINE INCON ASSESS: CPT | Performed by: INTERNAL MEDICINE

## 2019-06-18 PROCEDURE — 1123F ACP DISCUSS/DSCN MKR DOCD: CPT | Performed by: INTERNAL MEDICINE

## 2019-06-18 PROCEDURE — G8420 CALC BMI NORM PARAMETERS: HCPCS | Performed by: INTERNAL MEDICINE

## 2019-06-18 PROCEDURE — G8427 DOCREV CUR MEDS BY ELIG CLIN: HCPCS | Performed by: INTERNAL MEDICINE

## 2019-06-18 PROCEDURE — 4040F PNEUMOC VAC/ADMIN/RCVD: CPT | Performed by: INTERNAL MEDICINE

## 2019-06-18 PROCEDURE — 1036F TOBACCO NON-USER: CPT | Performed by: INTERNAL MEDICINE

## 2019-06-18 NOTE — PATIENT INSTRUCTIONS
Patient Education        Heart-Healthy Diet: Care Instructions  Your Care Instructions    A heart-healthy diet has lots of vegetables, fruits, nuts, beans, and whole grains, and is low in salt. It limits foods that are high in saturated fat, such as meats, cheeses, and fried foods. It may be hard to change your diet, but even small changes can lower your risk of heart attack and heart disease. Follow-up care is a key part of your treatment and safety. Be sure to make and go to all appointments, and call your doctor if you are having problems. It's also a good idea to know your test results and keep a list of the medicines you take. How can you care for yourself at home? Watch your portions  · Learn what a serving is. A \"serving\" and a \"portion\" are not always the same thing. Make sure that you are not eating larger portions than are recommended. For example, a serving of pasta is ½ cup. A serving size of meat is 2 to 3 ounces. A 3-ounce serving is about the size of a deck of cards. Measure serving sizes until you are good at [place]" them. Keep in mind that restaurants often serve portions that are 2 or 3 times the size of one serving. · To keep your energy level up and keep you from feeling hungry, eat often but in smaller portions. · Eat only the number of calories you need to stay at a healthy weight. If you need to lose weight, eat fewer calories than your body burns (through exercise and other physical activity). Eat more fruits and vegetables  · Eat a variety of fruit and vegetables every day. Dark green, deep orange, red, or yellow fruits and vegetables are especially good for you. Examples include spinach, carrots, peaches, and berries. · Keep carrots, celery, and other veggies handy for snacks. Buy fruit that is in season and store it where you can see it so that you will be tempted to eat it. · Cook dishes that have a lot of veggies in them, such as stir-fries and soups.   Limit saturated and trans fat  · Read food labels, and try to avoid saturated and trans fats. They increase your risk of heart disease. Trans fat is found in many processed foods such as cookies and crackers. · Use olive or canola oil when you cook. Try cholesterol-lowering spreads, such as Benecol or Take Control. · Bake, broil, grill, or steam foods instead of frying them. · Choose lean meats instead of high-fat meats such as hot dogs and sausages. Cut off all visible fat when you prepare meat. · Eat fish, skinless poultry, and meat alternatives such as soy products instead of high-fat meats. Soy products, such as tofu, may be especially good for your heart. · Choose low-fat or fat-free milk and dairy products. Eat fish  · Eat at least two servings of fish a week. Certain fish, such as salmon and tuna, contain omega-3 fatty acids, which may help reduce your risk of heart attack. Eat foods high in fiber  · Eat a variety of grain products every day. Include whole-grain foods that have lots of fiber and nutrients. Examples of whole-grain foods include oats, whole wheat bread, and brown rice. · Buy whole-grain breads and cereals, instead of white bread or pastries. Limit salt and sodium  · Limit how much salt and sodium you eat to help lower your blood pressure. · Taste food before you salt it. Add only a little salt when you think you need it. With time, your taste buds will adjust to less salt. · Eat fewer snack items, fast foods, and other high-salt, processed foods. Check food labels for the amount of sodium in packaged foods. · Choose low-sodium versions of canned goods (such as soups, vegetables, and beans). Limit sugar  · Limit drinks and foods with added sugar. These include candy, desserts, and soda pop. Limit alcohol  · Limit alcohol to no more than 2 drinks a day for men and 1 drink a day for women. Too much alcohol can cause health problems. When should you call for help?   Watch closely for changes in your QBuy, and be sure to contact your doctor if:    · You would like help planning heart-healthy meals. Where can you learn more? Go to https://chpepiceweb.[facility]. org and sign in to your [employer] account. Enter V137 in the [employer] box to learn more about \"Heart-Healthy Diet: Care Instructions. \"     If you do not have an account, please click on the \"Sign Up Now\" link. Current as of: July 22, 2018  Content Version: 12.0  © 5180-1257 Healthwise, Incorporated. Care instructions adapted under license by [facility] ([facility]). If you have questions about a medical condition or this instruction, always ask your healthcare professional. [address] any warranty or liability for your use of this information.

## 2019-06-20 NOTE — PROGRESS NOTES
' ans Cardiology  MD Lea Alonso MD Alfredia Needy, MD Primus Hickman. Mary Jane Rey MD            Dia Trujillo   12/22/1927  Alyssia Millan MD    Mrs. Dia Trujillo was in the outpatient office on 6/18/2019 for follow up of her valvular heart disease. This pleasant, 80-year-old female has a history of mild AI, hypertension, dyslipidemia, hypothyroidism. She also had a meningioma for which she follows with Dr. Claude Nesbitt. Her last MRI on 5/29/2019 revealed stable size of the tumor. No intervention is planned. She presented to our office today for a yearly follow up visit. She has no specific complaints. She denies any chest pain or shortness of breath. She remains functional for her age without limiting cardiac complaints. Past medical history:   1. Allergies to Bentyl, codeine, prednisone and Mobic. 2. Former smoker. Quit in 1996.   3. Hypothyroidism. 4. Hyperlipidemia, diet controlled. 5. Common bilateral stricture, S/P repeat ERCP and stent placement. 6. Lexiscan MPS, 8/2011. Normal perfusion.  EF 70%.    7. Hypertension, recently diagnosed. Started on amlodipine. 8. Epistaxis requiring cauterization and rapid rhino pack placement in 9/2017.  9. Presentation, [facility], 9/24/2017, with epigastric burning pain.  Negative troponin x 3.    10. Lexiscan MPS, normal perfusion. EF 77%.    11. Presentation 05/02/2018 [facility] with left eye diplopia, blurred vision and left head fullness. MRI revealed mass in the posterior fossa (11 mm in diameter). Thought to be a meningioma. Follows Dr. Claude Nesbitt. 12. Echocardiogram 06/01/2018. EF 65%. Stage I diastolic dysfunction. Normal left atrium. Mild AR.   Normal estimated PA pressures.     Review of Systems:  HEENT: negative for acute visual and auditory problems  Constitutional: negative for fever and chills  Respiratory: negative for cough and hemoptysis  Cardiovascular: as per HPI  Gastrointestinal: negative for abdominal pain, diarrhea, nausea and vomiting  Genitourinary: negative for dysuria and hematuria  Derm: negative for rash and skin lesion(s)  Neurological: negative for seizures and tremors  Endocrine: negative for diabetic symptoms including polydipsia and polyuria  Musculoskeletal: negative for CTD  Psychiatric: negative for anxiety and major depression    On exam, she is an elderly female in no particular distress. BP: 166/88. P: 65.  R: 16. Wt. 121 lbs. BMI: 22. HEENT, conjunctiva pink. Sclerae anicteric. Mucous membranes are moist.  Neck, no JVD could be appreciated. Carotid upstrokes normal.  No bruits. Chest expands normally. No intercostal retractions. Cardiovascular exam reveals normal S1/S2. Regular rate and rhythm. No murmurs, rubs or gallops noted. Lungs have good air entry bilaterally without any creps, rhonchi or wheezes. Abdomen is soft, nontender with intact bowel sounds. Extremities have no edema. Distal pulses are normal bilaterally. No cyanosis. Skin has no rashes. Neurologically, oriented x 3. Psych, patient is pleasant. Back has no tenderness. Muscle tone is normal.       EKG, as per my interpretation, reveals sinus rhythm, anteroseptal T wave inversions that are chronic. No acute changes noted. Mrs. Nita Jones was in the outpatient office for follow up of her valvular heart disease. Clinically, she has no symptoms and appears compensated and euvolemic on exam.  BP is elevated here. She, however, notes that pressures 1-3 times daily at home and her blood pressures are typically in the 130's. She was asked to bring her blood pressure cuff to her doctor's visit next time in an attempt to correlate the numbers that she gets. Since her blood pressure has been within normal range at home, I elected not to initiate any antihypertensive medications.   Her outpatient meds are as follows:   Multiple Vitamins-Minerals (PRESERVISION AREDS 2+MULTI VIT PO) Take by mouth   celecoxib (CELEBREX) 200 MG

## 2019-09-09 ENCOUNTER — HOSPITAL ENCOUNTER (OUTPATIENT)
Age: 84
Setting detail: OBSERVATION
Discharge: HOME | End: 2019-09-10
Attending: EMERGENCY MEDICINE | Admitting: INTERNAL MEDICINE
Payer: MEDICARE

## 2019-09-09 ENCOUNTER — APPOINTMENT (OUTPATIENT)
Dept: CT IMAGING | Age: 84
End: 2019-09-09
Payer: MEDICARE

## 2019-09-09 ENCOUNTER — APPOINTMENT (OUTPATIENT)
Dept: GENERAL RADIOLOGY | Age: 84
End: 2019-09-09
Payer: MEDICARE

## 2019-09-09 DIAGNOSIS — R10.12 ABDOMINAL PAIN, LEFT UPPER QUADRANT: Primary | ICD-10-CM

## 2019-09-09 PROBLEM — R10.9 ABDOMINAL PAIN: Status: ACTIVE | Noted: 2019-09-09

## 2019-09-09 LAB
ALBUMIN SERPL-MCNC: 4.7 G/DL (ref 3.5–5.2)
ALP BLD-CCNC: 89 U/L (ref 35–104)
ALT SERPL-CCNC: 8 U/L (ref 0–32)
ANION GAP SERPL CALCULATED.3IONS-SCNC: 12 MMOL/L (ref 7–16)
AST SERPL-CCNC: 13 U/L (ref 0–31)
BACTERIA: ABNORMAL /HPF
BASOPHILS ABSOLUTE: 0.03 E9/L (ref 0–0.2)
BASOPHILS RELATIVE PERCENT: 0.7 % (ref 0–2)
BILIRUB SERPL-MCNC: 0.5 MG/DL (ref 0–1.2)
BILIRUBIN URINE: NEGATIVE
BLOOD, URINE: NEGATIVE
BUN BLDV-MCNC: 11 MG/DL (ref 8–23)
CALCIUM SERPL-MCNC: 10 MG/DL (ref 8.6–10.2)
CHLORIDE BLD-SCNC: 103 MMOL/L (ref 98–107)
CLARITY: CLEAR
CO2: 25 MMOL/L (ref 22–29)
COLOR: YELLOW
CREAT SERPL-MCNC: 0.8 MG/DL (ref 0.5–1)
EKG ATRIAL RATE: 69 BPM
EKG P AXIS: 49 DEGREES
EKG P-R INTERVAL: 158 MS
EKG Q-T INTERVAL: 394 MS
EKG QRS DURATION: 80 MS
EKG QTC CALCULATION (BAZETT): 422 MS
EKG R AXIS: -8 DEGREES
EKG T AXIS: 44 DEGREES
EKG VENTRICULAR RATE: 69 BPM
EOSINOPHILS ABSOLUTE: 0.06 E9/L (ref 0.05–0.5)
EOSINOPHILS RELATIVE PERCENT: 1.3 % (ref 0–6)
GFR AFRICAN AMERICAN: >60
GFR NON-AFRICAN AMERICAN: >60 ML/MIN/1.73
GLUCOSE BLD-MCNC: 105 MG/DL (ref 74–99)
GLUCOSE URINE: NEGATIVE MG/DL
HCT VFR BLD CALC: 40.9 % (ref 34–48)
HEMOGLOBIN: 13.4 G/DL (ref 11.5–15.5)
IMMATURE GRANULOCYTES #: 0.03 E9/L
IMMATURE GRANULOCYTES %: 0.7 % (ref 0–5)
KETONES, URINE: NEGATIVE MG/DL
LACTIC ACID, SEPSIS: 1 MMOL/L (ref 0.5–1.9)
LEUKOCYTE ESTERASE, URINE: ABNORMAL
LIPASE: 21 U/L (ref 13–60)
LYMPHOCYTES ABSOLUTE: 1.02 E9/L (ref 1.5–4)
LYMPHOCYTES RELATIVE PERCENT: 22.9 % (ref 20–42)
MCH RBC QN AUTO: 31.2 PG (ref 26–35)
MCHC RBC AUTO-ENTMCNC: 32.8 % (ref 32–34.5)
MCV RBC AUTO: 95.1 FL (ref 80–99.9)
MONOCYTES ABSOLUTE: 0.23 E9/L (ref 0.1–0.95)
MONOCYTES RELATIVE PERCENT: 5.2 % (ref 2–12)
NEUTROPHILS ABSOLUTE: 3.08 E9/L (ref 1.8–7.3)
NEUTROPHILS RELATIVE PERCENT: 69.2 % (ref 43–80)
NITRITE, URINE: NEGATIVE
PDW BLD-RTO: 13 FL (ref 11.5–15)
PH UA: 7 (ref 5–9)
PLATELET # BLD: 195 E9/L (ref 130–450)
PMV BLD AUTO: 9.2 FL (ref 7–12)
POTASSIUM REFLEX MAGNESIUM: 4.2 MMOL/L (ref 3.5–5)
PROTEIN UA: NEGATIVE MG/DL
RBC # BLD: 4.3 E12/L (ref 3.5–5.5)
RBC UA: ABNORMAL /HPF (ref 0–2)
SODIUM BLD-SCNC: 140 MMOL/L (ref 132–146)
SPECIFIC GRAVITY UA: 1.01 (ref 1–1.03)
TOTAL PROTEIN: 7.1 G/DL (ref 6.4–8.3)
TROPONIN: <0.01 NG/ML (ref 0–0.03)
UROBILINOGEN, URINE: 0.2 E.U./DL
WBC # BLD: 4.5 E9/L (ref 4.5–11.5)
WBC UA: ABNORMAL /HPF (ref 0–5)

## 2019-09-09 PROCEDURE — 96376 TX/PRO/DX INJ SAME DRUG ADON: CPT

## 2019-09-09 PROCEDURE — 84484 ASSAY OF TROPONIN QUANT: CPT

## 2019-09-09 PROCEDURE — 96374 THER/PROPH/DIAG INJ IV PUSH: CPT

## 2019-09-09 PROCEDURE — 6370000000 HC RX 637 (ALT 250 FOR IP): Performed by: INTERNAL MEDICINE

## 2019-09-09 PROCEDURE — 80053 COMPREHEN METABOLIC PANEL: CPT

## 2019-09-09 PROCEDURE — 94760 N-INVAS EAR/PLS OXIMETRY 1: CPT

## 2019-09-09 PROCEDURE — G0378 HOSPITAL OBSERVATION PER HR: HCPCS

## 2019-09-09 PROCEDURE — 94664 DEMO&/EVAL PT USE INHALER: CPT

## 2019-09-09 PROCEDURE — 6360000002 HC RX W HCPCS: Performed by: INTERNAL MEDICINE

## 2019-09-09 PROCEDURE — 83690 ASSAY OF LIPASE: CPT

## 2019-09-09 PROCEDURE — 6360000004 HC RX CONTRAST MEDICATION: Performed by: RADIOLOGY

## 2019-09-09 PROCEDURE — 74177 CT ABD & PELVIS W/CONTRAST: CPT

## 2019-09-09 PROCEDURE — 99285 EMERGENCY DEPT VISIT HI MDM: CPT

## 2019-09-09 PROCEDURE — 6360000002 HC RX W HCPCS: Performed by: EMERGENCY MEDICINE

## 2019-09-09 PROCEDURE — 93005 ELECTROCARDIOGRAM TRACING: CPT | Performed by: EMERGENCY MEDICINE

## 2019-09-09 PROCEDURE — 93010 ELECTROCARDIOGRAM REPORT: CPT | Performed by: INTERNAL MEDICINE

## 2019-09-09 PROCEDURE — 81001 URINALYSIS AUTO W/SCOPE: CPT

## 2019-09-09 PROCEDURE — 71045 X-RAY EXAM CHEST 1 VIEW: CPT

## 2019-09-09 PROCEDURE — 83605 ASSAY OF LACTIC ACID: CPT

## 2019-09-09 PROCEDURE — 85025 COMPLETE CBC W/AUTO DIFF WBC: CPT

## 2019-09-09 PROCEDURE — 96372 THER/PROPH/DIAG INJ SC/IM: CPT

## 2019-09-09 RX ORDER — MORPHINE SULFATE 2 MG/ML
2 INJECTION, SOLUTION INTRAMUSCULAR; INTRAVENOUS ONCE
Status: COMPLETED | OUTPATIENT
Start: 2019-09-09 | End: 2019-09-09

## 2019-09-09 RX ORDER — ASPIRIN 81 MG/1
81 TABLET ORAL DAILY
Status: DISCONTINUED | OUTPATIENT
Start: 2019-09-09 | End: 2019-09-10 | Stop reason: HOSPADM

## 2019-09-09 RX ORDER — MORPHINE SULFATE 2 MG/ML
1 INJECTION, SOLUTION INTRAMUSCULAR; INTRAVENOUS
Status: DISCONTINUED | OUTPATIENT
Start: 2019-09-09 | End: 2019-09-10 | Stop reason: HOSPADM

## 2019-09-09 RX ORDER — POLYETHYLENE GLYCOL 3350 17 G/17G
17 POWDER, FOR SOLUTION ORAL DAILY PRN
Status: DISCONTINUED | OUTPATIENT
Start: 2019-09-09 | End: 2019-09-10 | Stop reason: HOSPADM

## 2019-09-09 RX ORDER — CELECOXIB 100 MG/1
200 CAPSULE ORAL DAILY
Status: DISCONTINUED | OUTPATIENT
Start: 2019-09-09 | End: 2019-09-10 | Stop reason: HOSPADM

## 2019-09-09 RX ORDER — CHOLECALCIFEROL (VITAMIN D3) 50 MCG
2000 TABLET ORAL DAILY
Status: DISCONTINUED | OUTPATIENT
Start: 2019-09-09 | End: 2019-09-10 | Stop reason: HOSPADM

## 2019-09-09 RX ORDER — PANTOPRAZOLE SODIUM 40 MG/1
40 TABLET, DELAYED RELEASE ORAL
Status: DISCONTINUED | OUTPATIENT
Start: 2019-09-10 | End: 2019-09-10 | Stop reason: HOSPADM

## 2019-09-09 RX ORDER — FLUTICASONE PROPIONATE 110 UG/1
1 AEROSOL, METERED RESPIRATORY (INHALATION) 2 TIMES DAILY
Status: DISCONTINUED | OUTPATIENT
Start: 2019-09-09 | End: 2019-09-10 | Stop reason: HOSPADM

## 2019-09-09 RX ORDER — ACETAMINOPHEN 325 MG/1
650 TABLET ORAL EVERY 6 HOURS PRN
Status: DISCONTINUED | OUTPATIENT
Start: 2019-09-09 | End: 2019-09-10 | Stop reason: HOSPADM

## 2019-09-09 RX ORDER — SODIUM CHLORIDE 0.9 % (FLUSH) 0.9 %
10 SYRINGE (ML) INJECTION PRN
Status: DISCONTINUED | OUTPATIENT
Start: 2019-09-09 | End: 2019-09-10 | Stop reason: HOSPADM

## 2019-09-09 RX ORDER — LEVOTHYROXINE SODIUM 0.05 MG/1
50 TABLET ORAL DAILY
Status: DISCONTINUED | OUTPATIENT
Start: 2019-09-09 | End: 2019-09-10 | Stop reason: HOSPADM

## 2019-09-09 RX ORDER — SUMATRIPTAN 50 MG/1
100 TABLET, FILM COATED ORAL
Status: DISPENSED | OUTPATIENT
Start: 2019-09-09 | End: 2019-09-09

## 2019-09-09 RX ADMIN — ASPIRIN 81 MG: 81 TABLET, COATED ORAL at 15:08

## 2019-09-09 RX ADMIN — MORPHINE SULFATE 2 MG: 2 INJECTION, SOLUTION INTRAMUSCULAR; INTRAVENOUS at 11:36

## 2019-09-09 RX ADMIN — ENOXAPARIN SODIUM 40 MG: 40 INJECTION SUBCUTANEOUS at 15:08

## 2019-09-09 RX ADMIN — LEVOTHYROXINE SODIUM 50 MCG: 50 TABLET ORAL at 15:08

## 2019-09-09 RX ADMIN — IOPAMIDOL 110 ML: 755 INJECTION, SOLUTION INTRAVENOUS at 09:19

## 2019-09-09 RX ADMIN — MORPHINE SULFATE 1 MG: 2 INJECTION, SOLUTION INTRAMUSCULAR; INTRAVENOUS at 15:07

## 2019-09-09 RX ADMIN — CHOLECALCIFEROL TAB 50 MCG (2000 UNIT) 2000 UNITS: 50 TAB at 15:08

## 2019-09-09 RX ADMIN — FLUTICASONE PROPIONATE 1 PUFF: 110 AEROSOL, METERED RESPIRATORY (INHALATION) at 20:39

## 2019-09-09 RX ADMIN — MORPHINE SULFATE 1 MG: 2 INJECTION, SOLUTION INTRAMUSCULAR; INTRAVENOUS at 19:27

## 2019-09-09 SDOH — HEALTH STABILITY: MENTAL HEALTH: HOW OFTEN DO YOU HAVE A DRINK CONTAINING ALCOHOL?: NEVER

## 2019-09-09 ASSESSMENT — ENCOUNTER SYMPTOMS
BLOOD IN STOOL: 0
SHORTNESS OF BREATH: 0
BACK PAIN: 0
ABDOMINAL PAIN: 1
NAUSEA: 1
VOMITING: 0
DIARRHEA: 0
CHEST TIGHTNESS: 0
COLOR CHANGE: 0
COUGH: 0

## 2019-09-09 ASSESSMENT — PAIN SCALES - GENERAL
PAINLEVEL_OUTOF10: 8
PAINLEVEL_OUTOF10: 8
PAINLEVEL_OUTOF10: 9
PAINLEVEL_OUTOF10: 8
PAINLEVEL_OUTOF10: 9
PAINLEVEL_OUTOF10: 9

## 2019-09-09 NOTE — ED PROVIDER NOTES
Axis -8 degrees    T Axis 44 degrees       Radiology  XR CHEST PORTABLE   Final Result      No sign of pneumonia. Slight elevation of the left hemidiaphragm. Findings suggestive of chronic obstructive pulmonary disease. Aortic atherosclerosis. CT ABDOMEN PELVIS W IV CONTRAST Additional Contrast? None    (Results Pending)       EKG: This EKG is signed and interpreted by me. Rate: 69  Rhythm: Sinus  Interpretation: Sinus rhythm, non-specific ST-T wave changes  Comparison: stable as compared to patient's most recent EKG (T wave inversion V3 likely 2/2 lead comparison compared to prior studies all with V1/V2 inversion)    ------------------------- NURSING NOTES AND VITALS REVIEWED ---------------------------  Date / Time Roomed:  9/9/2019  6:53 AM  ED Bed Assignment:  20/20    The nursing notes within the ED encounter and vital signs as below have been reviewed. Patient Vitals for the past 24 hrs:   BP Temp Temp src Pulse Resp SpO2 Height Weight   09/09/19 0716 (!) 186/91 97.8 °F (36.6 °C) Oral 76 18 96 % 5' 1\" (1.549 m) 121 lb (54.9 kg)       Oxygen Saturation Interpretation: Normal    ------------------------------------------ PROGRESS NOTES ------------------------------------------  Re-evaluation(s):  7:19 AM: Attending updated. Case and plan discussed. Evaluated patient. Consultations:  Spoke with Dr. Wilfredo Khan (Gastroenterology). Discussed case. They will admit this patient.    --------------------------------------- ED Clinical Course/MDM --------------------------------------    Patient is a 80 y.o. female presenting to the Emergency Department for abdominal pain. After thorough history, physical, labs, imaging, EKG reviewed presentation is most consistent with LUQ abdominal pain. Differential diagnoses include, but are not limited to, pancreatitis, AA, pna, colitis, enteritis, SBO. Initially declined pain medication but later consented.  Discussed results of imaging with her

## 2019-09-09 NOTE — PLAN OF CARE
Problem: Falls - Risk of:  Goal: Will remain free from falls  Description  Will remain free from falls  9/9/2019 1848 by Estefanía Lyle RN  Outcome: Met This Shift  9/9/2019 1205 by Aletta Najjar, RN  Outcome: Met This Shift  Goal: Absence of physical injury  Description  Absence of physical injury  9/9/2019 1848 by Estefanía Lyle RN  Outcome: Met This Shift  9/9/2019 1205 by Aletta Najjar, RN  Outcome: Met This Shift     Problem: Pain:  Goal: Control of chronic pain  Description  Control of chronic pain  Outcome: Met This Shift     Problem: Infection:  Goal: Absence of urinary tract infection signs and symptoms  Description  Absence of urinary tract infection signs and symptoms     9/9/2019 1205 by Aletta Najjar, RN  Outcome: Ongoing     Problem: Pain:  Goal: Pain level will decrease  Description  Pain level will decrease     Pain level will decrease  9/9/2019 1848 by Estefanía Lyle RN  Outcome: Not Met This Shift  9/9/2019 1205 by Aletta Najjar, RN  Outcome: Met This Shift     Problem: Pain:  Goal: Pain level will decrease  Description  Pain level will decrease     Pain level will decrease  9/9/2019 1848 by Estefanía Lyle RN  Outcome: Not Met This Shift  9/9/2019 1205 by Aletta Najjar, RN  Outcome: Met This Shift  Goal: Control of acute pain  Description  Control of acute pain  Outcome: Not Met This Shift

## 2019-09-10 VITALS
WEIGHT: 121 LBS | SYSTOLIC BLOOD PRESSURE: 118 MMHG | DIASTOLIC BLOOD PRESSURE: 60 MMHG | TEMPERATURE: 97.7 F | HEART RATE: 80 BPM | RESPIRATION RATE: 16 BRPM | BODY MASS INDEX: 22.84 KG/M2 | OXYGEN SATURATION: 94 % | HEIGHT: 61 IN

## 2019-09-10 PROCEDURE — G0378 HOSPITAL OBSERVATION PER HR: HCPCS

## 2019-09-10 PROCEDURE — 97161 PT EVAL LOW COMPLEX 20 MIN: CPT

## 2019-09-10 PROCEDURE — 96372 THER/PROPH/DIAG INJ SC/IM: CPT

## 2019-09-10 PROCEDURE — 6360000002 HC RX W HCPCS: Performed by: INTERNAL MEDICINE

## 2019-09-10 PROCEDURE — 94640 AIRWAY INHALATION TREATMENT: CPT

## 2019-09-10 PROCEDURE — 96376 TX/PRO/DX INJ SAME DRUG ADON: CPT

## 2019-09-10 PROCEDURE — 6370000000 HC RX 637 (ALT 250 FOR IP): Performed by: INTERNAL MEDICINE

## 2019-09-10 PROCEDURE — 2580000003 HC RX 258: Performed by: EMERGENCY MEDICINE

## 2019-09-10 RX ADMIN — MORPHINE SULFATE 1 MG: 2 INJECTION, SOLUTION INTRAMUSCULAR; INTRAVENOUS at 00:42

## 2019-09-10 RX ADMIN — ENOXAPARIN SODIUM 40 MG: 40 INJECTION SUBCUTANEOUS at 09:00

## 2019-09-10 RX ADMIN — MORPHINE SULFATE 1 MG: 2 INJECTION, SOLUTION INTRAMUSCULAR; INTRAVENOUS at 06:18

## 2019-09-10 RX ADMIN — ACETAMINOPHEN 650 MG: 325 TABLET ORAL at 17:00

## 2019-09-10 RX ADMIN — CHOLECALCIFEROL TAB 50 MCG (2000 UNIT) 2000 UNITS: 50 TAB at 09:01

## 2019-09-10 RX ADMIN — LEVOTHYROXINE SODIUM 50 MCG: 50 TABLET ORAL at 06:17

## 2019-09-10 RX ADMIN — ASPIRIN 81 MG: 81 TABLET, COATED ORAL at 09:01

## 2019-09-10 RX ADMIN — ACETAMINOPHEN 650 MG: 325 TABLET ORAL at 11:00

## 2019-09-10 RX ADMIN — PANTOPRAZOLE SODIUM 40 MG: 40 TABLET, DELAYED RELEASE ORAL at 06:17

## 2019-09-10 RX ADMIN — FLUTICASONE PROPIONATE 1 PUFF: 110 AEROSOL, METERED RESPIRATORY (INHALATION) at 07:57

## 2019-09-10 RX ADMIN — Medication 10 ML: at 06:19

## 2019-09-10 ASSESSMENT — PAIN DESCRIPTION - LOCATION: LOCATION: ABDOMEN

## 2019-09-10 ASSESSMENT — PAIN DESCRIPTION - PAIN TYPE: TYPE: ACUTE PAIN

## 2019-09-10 ASSESSMENT — PAIN SCALES - GENERAL
PAINLEVEL_OUTOF10: 6
PAINLEVEL_OUTOF10: 2
PAINLEVEL_OUTOF10: 7
PAINLEVEL_OUTOF10: 7
PAINLEVEL_OUTOF10: 6
PAINLEVEL_OUTOF10: 0
PAINLEVEL_OUTOF10: 0

## 2019-09-10 NOTE — PATIENT CARE CONFERENCE
[facility] Quality Flow/Interdisciplinary Rounds Progress Note        Quality Flow Rounds held on September 10, 2019    Disciplines Attending:  Bedside Nurse, ,  and Nursing Unit Leadership    Chanel Talbot was admitted on 9/9/2019  6:53 AM    Anticipated Discharge Date:  Expected Discharge Date: 09/12/19    Disposition:    Freeman Score:  Freeman Scale Score: 21    Readmission Risk              Risk of Unplanned Readmission:        8           Discussed patient goal for the day, patient clinical progression, and barriers to discharge.   The following Goal(s) of the Day/Commitment(s) have been identified:  Labs - Report Results      Abdirahman Bob  September 10, 2019

## 2019-09-10 NOTE — PLAN OF CARE
Problem: Falls - Risk of:  Goal: Will remain free from falls  Description  Will remain free from falls  Outcome: Met This Shift  Goal: Absence of physical injury  Description  Absence of physical injury  Outcome: Met This Shift     Problem: Infection:  Goal: Absence of urinary tract infection signs and symptoms  Description  Absence of urinary tract infection signs and symptoms     Outcome: Met This Shift     Problem: Pain:  Goal: Control of acute pain  Description  Control of acute pain  Outcome: Met This Shift  Goal: Control of chronic pain  Description  Control of chronic pain  Outcome: Met This Shift     Problem: Pain:  Goal: Pain level will decrease  Description  Pain level will decrease     Pain level will decrease  Outcome: Not Met This Shift  Note:   Patient reports acute abdominal pain     Problem: Pain:  Goal: Pain level will decrease  Description  Pain level will decrease     Pain level will decrease  Outcome: Not Met This Shift  Note:   Patient reports acute abdominal pain     Electronically signed by Kamran Lucero RN on 9/10/19 at 5:19 PM

## 2019-09-10 NOTE — CONSULTS
disease identified and it was not  clear that she ever benefited from such intervention. Subsequent to  that, she had complaints of abdominal pain, which I believe were quite  similar, but she now had a dilated common bile duct, but consistently  normal liver function studies, and then underwent a series of ERCPs with  stent placement and stent removal, but with no stones and no clear  improvement. Her last ERCP was completed in 2016. She has been  evaluated endoscopically from above since that time with no findings. Colonoscopy was last completed in 2015 and she does have severe  diverticular disease, but that seems to be only an incidental finding. PAST MEDICAL HISTORY:  Includes hypothyroidism, chronic back pain,  meningioma, irritable bowel syndrome, hyperlipidemia, COPD, and  questionable acid reflux. PAST SURGICAL HISTORY:  Includes three ERCPs, numerous colonoscopies and  endoscopies, cholecystectomy, breast biopsy, appendectomy, and  hysterectomy. She has had kyphoplasty. HOME MEDICATIONS:  Do [place], recently added for constipation;  Celebrex; aspirin; Dexilant; Pulmicort; tramadol; GlycoLax;  acetaminophen; Imitrex; and Synthroid with allergies to NORVASC,  DICYCLOMINE, MOBIC, PREDNISONE, AND PANTOPRAZOLE. SOCIAL HISTORY:  She is . Very vigorous. Former smoker. No  alcohol. OBJECTIVE:  GENERAL:  She is currently lying comfortably. VITAL SIGNS:  She has been afebrile all day. Her pulse is running no  higher than 75. Her respirations are 16. Blood pressures have been  normal.  Room air saturation 97%. Weight of 122 pounds. HEENT:  She is not pale or jaundiced. NECK:  Neck veins are flat. LUNGS:  Lung fields are clear. HEART:  Heart tones are normal and there was really no reproducible  tenderness of any magnitude. EXTREMITIES:  Normal.    DIAGNOSTIC DATA:  Reviewing her CAT scan, I would concur with the interpretation.  There hasbeen recent CAT scan done at

## 2019-10-27 NOTE — H&P
[address] [facility]                  [address]                              HISTORY AND PHYSICAL    PATIENT NAME: Valencia Norwood                          :        1927  MED REC NO:   [id number]                            ROOM:       7990  ACCOUNT NO:   [de-identified]                           ADMIT DATE: 2019  PROVIDER:     Louie Murray MD    DATE OF ADMISSION:  2019    HISTORY OF PRESENT ILLNESS:  She is 89. She was awoken from sleep at  03:00 a.m. this morning with pain, which was felt predominantly  epigastric in location, severe, but without vomiting and she also felt  it towards the back. She waited approximately 2 hours to call her  daughter who then waited until 6 o'clock to call me. I have known the  patient for years and I have evaluated her for abdominal pain in the  past without any clear-cut diagnosis, but she has never awoken me at 5  o'clock in the morning, so I felt it prudent to send her to the  emergency room. There, she was found to be stable with normal lab work  throughout including her liver function studies and serum lipase. Another CAT scan was done, one had recently just been done for a left  lower quadrant pain several weeks ago yielding diverticular disease  without diverticulitis and this examination was otherwise unchanged. There was said to be small bowel dilatation of mild degree in the left  upper quadrant suggestive of adynamic ileus, but having reviewed the  films, I find that that does not seem very impressive or a real finding. She has had diminishing abdominal pain, but is still using opioids when  offered to her. She is tolerating a full liquid diet. Her past medical history includes abdominal pain.   Years ago, she  underwent a cholecystectomy despite an ultrasound and HIDA scan being  negative and there was no calculus disease identified in the pathology  specimen and it was not clear that she ever benefited from such  intervention. Subsequent to that, she had continued complaints of  abdominal pain, which were quite similar, but now she had a dilated  common bile duct, but with consistently normal liver function studies. She then underwent a series of ERCPs with stent placement and stent  removal, but no stones and no clear improvement. Her last ERCP was  completed in 2016. She has been evaluated endoscopically from above  since that time with no findings. Colonoscopy was completed in 2015 and  she does have severe diverticular disease, but that seemed to be only an  incidental finding and unrelated to her complaints. PAST MEDICAL HISTORY:  Includes hypothyroidism, chronic back pain, a  history of a meningioma, irritable bowel syndrome, hyperlipidemia, COPD,  and questionable acid reflux. PAST SURGICAL HISTORY:  Includes three ERCPs, numerous endoscopic  procedures, cholecystectomy, breast biopsy, appendectomy, and  hysterectomy as well as a kyphoplasty. HOME MEDICATIONS:  Include Linzess, Celebrex, aspirin, Dexilant,  Pulmicort, tramadol, GlycoLax, acetaminophen, Imitrex, Synthroid, and  allergies listed as NORVASC, DICYCLOMINE, MOBIC, PREDNISONE and  PANTOPRAZOLE. SOCIAL HISTORY:  She is . She is very vigorous in activities of  daily living and is independent. She lives alone. Daughter lives in  close proximity. Former smoker. No alcohol. OBJECTIVE:  GENERAL:  She is lying comfortably. VITAL SIGNS:  She has been afebrile. Her pulse is running no higher  than 75 per minute. Respirations are 16. Blood pressure has been  normal.  Room air saturation 97% and weight 122 pounds. HEAD, EARS, EYES, NOSE, and THROAT:  No pallor or jaundice. NECK:  Flat neck veins. LUNGS:  Clear to auscultation and percussion. HEART:  Heart tones normal and there is no reproducible tenderness in  the chest.  ABDOMEN:  Completely benign. Nontender, nondistended.   No hepatic

## 2020-01-05 ENCOUNTER — HOSPITAL ENCOUNTER (INPATIENT)
Age: 85
LOS: 1 days | Discharge: HOME | DRG: 313 | End: 2020-01-05
Attending: INTERNAL MEDICINE | Admitting: INTERNAL MEDICINE
Payer: MEDICARE

## 2020-01-05 ENCOUNTER — APPOINTMENT (OUTPATIENT)
Dept: GENERAL RADIOLOGY | Age: 85
DRG: 313 | End: 2020-01-05
Payer: MEDICARE

## 2020-01-05 VITALS
BODY MASS INDEX: 22.09 KG/M2 | HEIGHT: 61 IN | OXYGEN SATURATION: 95 % | HEART RATE: 97 BPM | DIASTOLIC BLOOD PRESSURE: 64 MMHG | WEIGHT: 117 LBS | SYSTOLIC BLOOD PRESSURE: 146 MMHG | TEMPERATURE: 97.8 F | RESPIRATION RATE: 16 BRPM

## 2020-01-05 PROBLEM — R07.9 CHEST PAIN: Status: ACTIVE | Noted: 2020-01-05

## 2020-01-05 PROBLEM — K21.9 GERD (GASTROESOPHAGEAL REFLUX DISEASE): Status: ACTIVE | Noted: 2020-01-05

## 2020-01-05 LAB
ANION GAP SERPL CALCULATED.3IONS-SCNC: 11 MMOL/L (ref 7–16)
BASOPHILS ABSOLUTE: 0.04 E9/L (ref 0–0.2)
BASOPHILS RELATIVE PERCENT: 0.7 % (ref 0–2)
BUN BLDV-MCNC: 11 MG/DL (ref 8–23)
CALCIUM SERPL-MCNC: 9.7 MG/DL (ref 8.6–10.2)
CHLORIDE BLD-SCNC: 106 MMOL/L (ref 98–107)
CO2: 22 MMOL/L (ref 22–29)
CREAT SERPL-MCNC: 0.9 MG/DL (ref 0.5–1)
EKG ATRIAL RATE: 80 BPM
EKG P AXIS: 53 DEGREES
EKG P-R INTERVAL: 164 MS
EKG Q-T INTERVAL: 364 MS
EKG QRS DURATION: 82 MS
EKG QTC CALCULATION (BAZETT): 419 MS
EKG R AXIS: -9 DEGREES
EKG T AXIS: 37 DEGREES
EKG VENTRICULAR RATE: 80 BPM
EOSINOPHILS ABSOLUTE: 0.15 E9/L (ref 0.05–0.5)
EOSINOPHILS RELATIVE PERCENT: 2.7 % (ref 0–6)
GFR AFRICAN AMERICAN: >60
GFR NON-AFRICAN AMERICAN: 59 ML/MIN/1.73
GLUCOSE BLD-MCNC: 110 MG/DL (ref 74–99)
HCT VFR BLD CALC: 37.6 % (ref 34–48)
HEMOGLOBIN: 12 G/DL (ref 11.5–15.5)
IMMATURE GRANULOCYTES #: 0.03 E9/L
IMMATURE GRANULOCYTES %: 0.5 % (ref 0–5)
LYMPHOCYTES ABSOLUTE: 1.26 E9/L (ref 1.5–4)
LYMPHOCYTES RELATIVE PERCENT: 22.7 % (ref 20–42)
MCH RBC QN AUTO: 30.5 PG (ref 26–35)
MCHC RBC AUTO-ENTMCNC: 31.9 % (ref 32–34.5)
MCV RBC AUTO: 95.7 FL (ref 80–99.9)
MONOCYTES ABSOLUTE: 0.31 E9/L (ref 0.1–0.95)
MONOCYTES RELATIVE PERCENT: 5.6 % (ref 2–12)
NEUTROPHILS ABSOLUTE: 3.75 E9/L (ref 1.8–7.3)
NEUTROPHILS RELATIVE PERCENT: 67.8 % (ref 43–80)
PDW BLD-RTO: 13.1 FL (ref 11.5–15)
PLATELET # BLD: 173 E9/L (ref 130–450)
PMV BLD AUTO: 9.6 FL (ref 7–12)
POTASSIUM REFLEX MAGNESIUM: 4.4 MMOL/L (ref 3.5–5)
PRO-BNP: 127 PG/ML (ref 0–450)
RBC # BLD: 3.93 E12/L (ref 3.5–5.5)
SODIUM BLD-SCNC: 139 MMOL/L (ref 132–146)
TROPONIN: <0.01 NG/ML (ref 0–0.03)
WBC # BLD: 5.5 E9/L (ref 4.5–11.5)

## 2020-01-05 PROCEDURE — 71045 X-RAY EXAM CHEST 1 VIEW: CPT

## 2020-01-05 PROCEDURE — 6370000000 HC RX 637 (ALT 250 FOR IP): Performed by: EMERGENCY MEDICINE

## 2020-01-05 PROCEDURE — 83880 ASSAY OF NATRIURETIC PEPTIDE: CPT

## 2020-01-05 PROCEDURE — 93005 ELECTROCARDIOGRAM TRACING: CPT | Performed by: EMERGENCY MEDICINE

## 2020-01-05 PROCEDURE — 99223 1ST HOSP IP/OBS HIGH 75: CPT | Performed by: INTERNAL MEDICINE

## 2020-01-05 PROCEDURE — 36415 COLL VENOUS BLD VENIPUNCTURE: CPT

## 2020-01-05 PROCEDURE — 6360000002 HC RX W HCPCS: Performed by: INTERNAL MEDICINE

## 2020-01-05 PROCEDURE — 6360000002 HC RX W HCPCS: Performed by: EMERGENCY MEDICINE

## 2020-01-05 PROCEDURE — 99285 EMERGENCY DEPT VISIT HI MDM: CPT

## 2020-01-05 PROCEDURE — 96375 TX/PRO/DX INJ NEW DRUG ADDON: CPT

## 2020-01-05 PROCEDURE — 2140000000 HC CCU INTERMEDIATE R&B

## 2020-01-05 PROCEDURE — 85025 COMPLETE CBC W/AUTO DIFF WBC: CPT

## 2020-01-05 PROCEDURE — 80048 BASIC METABOLIC PNL TOTAL CA: CPT

## 2020-01-05 PROCEDURE — 96374 THER/PROPH/DIAG INJ IV PUSH: CPT

## 2020-01-05 PROCEDURE — 93010 ELECTROCARDIOGRAM REPORT: CPT | Performed by: INTERNAL MEDICINE

## 2020-01-05 PROCEDURE — 6370000000 HC RX 637 (ALT 250 FOR IP): Performed by: INTERNAL MEDICINE

## 2020-01-05 PROCEDURE — 84484 ASSAY OF TROPONIN QUANT: CPT

## 2020-01-05 RX ORDER — ONDANSETRON 2 MG/ML
4 INJECTION INTRAMUSCULAR; INTRAVENOUS EVERY 6 HOURS PRN
Status: DISCONTINUED | OUTPATIENT
Start: 2020-01-05 | End: 2020-01-05 | Stop reason: HOSPADM

## 2020-01-05 RX ORDER — TRAMADOL HYDROCHLORIDE 50 MG/1
50 TABLET ORAL EVERY 6 HOURS PRN
Qty: 15 TABLET | Refills: 0 | Status: SHIPPED | OUTPATIENT
Start: 2020-01-05 | End: 2020-01-12

## 2020-01-05 RX ORDER — ONDANSETRON 2 MG/ML
4 INJECTION INTRAMUSCULAR; INTRAVENOUS ONCE
Status: COMPLETED | OUTPATIENT
Start: 2020-01-05 | End: 2020-01-05

## 2020-01-05 RX ORDER — FLUTICASONE PROPIONATE 110 UG/1
2 AEROSOL, METERED RESPIRATORY (INHALATION) 2 TIMES DAILY
Status: DISCONTINUED | OUTPATIENT
Start: 2020-01-05 | End: 2020-01-05 | Stop reason: HOSPADM

## 2020-01-05 RX ORDER — MORPHINE SULFATE 2 MG/ML
2 INJECTION, SOLUTION INTRAMUSCULAR; INTRAVENOUS ONCE
Status: COMPLETED | OUTPATIENT
Start: 2020-01-05 | End: 2020-01-05

## 2020-01-05 RX ORDER — SODIUM CHLORIDE 0.9 % (FLUSH) 0.9 %
10 SYRINGE (ML) INJECTION PRN
Status: DISCONTINUED | OUTPATIENT
Start: 2020-01-05 | End: 2020-01-05 | Stop reason: SDUPTHER

## 2020-01-05 RX ORDER — SODIUM CHLORIDE 0.9 % (FLUSH) 0.9 %
10 SYRINGE (ML) INJECTION EVERY 12 HOURS SCHEDULED
Status: DISCONTINUED | OUTPATIENT
Start: 2020-01-05 | End: 2020-01-05 | Stop reason: SDUPTHER

## 2020-01-05 RX ORDER — SUMATRIPTAN 50 MG/1
100 TABLET, FILM COATED ORAL
Status: DISCONTINUED | OUTPATIENT
Start: 2020-01-05 | End: 2020-01-05 | Stop reason: CLARIF

## 2020-01-05 RX ORDER — PANTOPRAZOLE SODIUM 40 MG/1
40 TABLET, DELAYED RELEASE ORAL
Status: DISCONTINUED | OUTPATIENT
Start: 2020-01-05 | End: 2020-01-05 | Stop reason: HOSPADM

## 2020-01-05 RX ORDER — SODIUM CHLORIDE 0.9 % (FLUSH) 0.9 %
10 SYRINGE (ML) INJECTION EVERY 12 HOURS SCHEDULED
Status: DISCONTINUED | OUTPATIENT
Start: 2020-01-05 | End: 2020-01-05 | Stop reason: HOSPADM

## 2020-01-05 RX ORDER — ACETAMINOPHEN 325 MG/1
650 TABLET ORAL EVERY 6 HOURS PRN
Status: DISCONTINUED | OUTPATIENT
Start: 2020-01-05 | End: 2020-01-05 | Stop reason: ALTCHOICE

## 2020-01-05 RX ORDER — LEVOTHYROXINE SODIUM 0.05 MG/1
50 TABLET ORAL
Status: DISCONTINUED | OUTPATIENT
Start: 2020-01-06 | End: 2020-01-05 | Stop reason: HOSPADM

## 2020-01-05 RX ORDER — CHOLECALCIFEROL (VITAMIN D3) 50 MCG
2000 TABLET ORAL DAILY
Status: DISCONTINUED | OUTPATIENT
Start: 2020-01-05 | End: 2020-01-05 | Stop reason: HOSPADM

## 2020-01-05 RX ORDER — SODIUM CHLORIDE 0.9 % (FLUSH) 0.9 %
10 SYRINGE (ML) INJECTION PRN
Status: DISCONTINUED | OUTPATIENT
Start: 2020-01-05 | End: 2020-01-05 | Stop reason: HOSPADM

## 2020-01-05 RX ORDER — ASPIRIN 81 MG/1
81 TABLET ORAL DAILY
Status: DISCONTINUED | OUTPATIENT
Start: 2020-01-06 | End: 2020-01-05 | Stop reason: HOSPADM

## 2020-01-05 RX ORDER — ATORVASTATIN CALCIUM 40 MG/1
40 TABLET, FILM COATED ORAL NIGHTLY
Status: DISCONTINUED | OUTPATIENT
Start: 2020-01-05 | End: 2020-01-05 | Stop reason: HOSPADM

## 2020-01-05 RX ORDER — ACETAMINOPHEN 325 MG/1
650 TABLET ORAL EVERY 4 HOURS PRN
Status: DISCONTINUED | OUTPATIENT
Start: 2020-01-05 | End: 2020-01-05 | Stop reason: HOSPADM

## 2020-01-05 RX ORDER — TRAMADOL HYDROCHLORIDE 50 MG/1
50 TABLET ORAL EVERY 6 HOURS PRN
Status: DISCONTINUED | OUTPATIENT
Start: 2020-01-05 | End: 2020-01-05 | Stop reason: HOSPADM

## 2020-01-05 RX ADMIN — ACETAMINOPHEN 650 MG: 325 TABLET, FILM COATED ORAL at 16:00

## 2020-01-05 RX ADMIN — ENOXAPARIN SODIUM 40 MG: 40 INJECTION SUBCUTANEOUS at 10:35

## 2020-01-05 RX ADMIN — MORPHINE SULFATE 2 MG: 2 INJECTION, SOLUTION INTRAMUSCULAR; INTRAVENOUS at 06:25

## 2020-01-05 RX ADMIN — ACETAMINOPHEN 650 MG: 325 TABLET, FILM COATED ORAL at 10:35

## 2020-01-05 RX ADMIN — Medication 2000 UNITS: at 10:35

## 2020-01-05 RX ADMIN — FLUTICASONE PROPIONATE 2 PUFF: 110 AEROSOL, METERED RESPIRATORY (INHALATION) at 10:35

## 2020-01-05 RX ADMIN — MORPHINE SULFATE 2 MG: 2 INJECTION, SOLUTION INTRAMUSCULAR; INTRAVENOUS at 05:03

## 2020-01-05 RX ADMIN — ONDANSETRON 4 MG: 2 INJECTION INTRAMUSCULAR; INTRAVENOUS at 05:03

## 2020-01-05 RX ADMIN — LIDOCAINE HYDROCHLORIDE: 20 SOLUTION ORAL; TOPICAL at 06:19

## 2020-01-05 ASSESSMENT — PAIN SCALES - GENERAL
PAINLEVEL_OUTOF10: 3
PAINLEVEL_OUTOF10: 4
PAINLEVEL_OUTOF10: 9
PAINLEVEL_OUTOF10: 2
PAINLEVEL_OUTOF10: 3
PAINLEVEL_OUTOF10: 4
PAINLEVEL_OUTOF10: 4
PAINLEVEL_OUTOF10: 8

## 2020-01-05 ASSESSMENT — PAIN DESCRIPTION - ONSET
ONSET: ON-GOING
ONSET: ON-GOING

## 2020-01-05 ASSESSMENT — PAIN DESCRIPTION - FREQUENCY
FREQUENCY: CONTINUOUS
FREQUENCY: CONTINUOUS

## 2020-01-05 ASSESSMENT — PAIN DESCRIPTION - LOCATION
LOCATION: CHEST

## 2020-01-05 ASSESSMENT — PAIN DESCRIPTION - DESCRIPTORS
DESCRIPTORS: PRESSURE
DESCRIPTORS: PRESSURE

## 2020-01-05 ASSESSMENT — PAIN DESCRIPTION - PROGRESSION
CLINICAL_PROGRESSION: NOT CHANGED
CLINICAL_PROGRESSION: NOT CHANGED

## 2020-01-05 ASSESSMENT — PAIN - FUNCTIONAL ASSESSMENT
PAIN_FUNCTIONAL_ASSESSMENT: ACTIVITIES ARE NOT PREVENTED
PAIN_FUNCTIONAL_ASSESSMENT: ACTIVITIES ARE NOT PREVENTED

## 2020-01-05 ASSESSMENT — PAIN DESCRIPTION - PAIN TYPE
TYPE: ACUTE PAIN

## 2020-01-05 ASSESSMENT — PAIN DESCRIPTION - ORIENTATION: ORIENTATION: MID

## 2020-01-05 NOTE — H&P
[address] [facility]  History and Physical      CHIEF COMPLAINT: Pain      HISTORY OF PRESENT ILLNESS:      The patient is a 80 y.o. female patient of Dr. Ingris Chamorro who presents with chest pain. Lasting for hours started while sleeping. Nonexertional.  No exacerbation relief. Hurrts when pushes. No associated shortness of breath or diaphoresis. Patient is admitted for further evaluation treatment as well as cardiology consultation. Denies GERD sxs.     Past Medical History:    Past Medical History:   Diagnosis Date    Acid reflux disease     Arthritis     Chronic back pain     [facility] obstructive [facility] disease[facility])     Hearing loss     Hyperlipidemia     Irritable bowel     Meningioma ([facility] [address] 05/2018    Migraine     Neuropathy     Osteoarthritis     Osteoporosis     Thyroid disease     hypo    TIA (transient ischemic attack) 05/2018       Past Surgical History:    Past Surgical History:   Procedure Laterality Date    ANKLE SURGERY  1980    pin placement    APPENDECTOMY      BLEPHAROPLASTY      BREAST BIOPSY  1978    BREAST SURGERY      biopsy bilat     CHOLECYSTECTOMY      COLONOSCOPY      ENDOSCOPY, COLON, DIAGNOSTIC  8/2012    ERCP  10/31/2012    ERCP  12/05/2012    stent removal    EYE SURGERY      cataract    FIXATION KYPHOPLASTY  08/03/2016    LUMBAR 2    FRACTURE SURGERY      left ankle, pin placement    HYSTERECTOMY      MA OFFICE/OUTPT VISIT,PROCEDURE ONLY N/A 8/1/2018    KYPHOPLASTY  T11  -- TIME UNDETERMINED performed by Clydie Schlatter, DO at [facility]      biopsy    TONSILLECTOMY         Medications Prior to Admission:    Medications Prior to Admission: Cholecalciferol (VITAMIN D3) 2000 units CAPS, Take 2,000 Units by mouth daily   aspirin 81 MG EC tablet, Take 1 tablet by mouth daily  acetaminophen (TYLENOL) 325 MG tablet, Take 650 mg by mouth every 6 hours as needed for Pain  dexlansoprazole (DEXILANT) 60 MG CPDR capsule, Take 60 mg by mouth daily Instructed to take am of procedure  budesonide (PULMICORT) 180 MCG/ACT AEPB inhaler, Inhale 2 puffs into the lungs daily Use am of 10/31/16  levothyroxine (SYNTHROID) 50 MCG tablet, Take 50 mcg by mouth Six times weekly None on sunday    Allergies:    Amlodipine besylate; Bentyl [dicyclomine hcl]; Codeine; Lactose intolerance (gi); Mobic [meloxicam]; Monosodium glutamate; Other; Pantoprazole; Prednisone; and Talwin [pentazocine]    Social History:    reports that she quit smoking about 23 years ago. Her smoking use included cigarettes. She started smoking about 60 years ago. She smoked 1.00 pack per day. She has never used smokeless tobacco. She reports that she does not drink alcohol or use drugs. Family History:   family history includes Cancer in her brother, brother, and mother; Emphysema in her father; Heart Disease in her brother; Rheum Arthritis in her sister. REVIEW OF SYSTEMS:  As above in the HPI, otherwise negative    PHYSICAL EXAM:    Vitals:  BP (!) 146/64   Pulse 97   Temp 97.8 °F (36.6 °C) (Temporal)   Resp 16   Ht 5' 1\" (1.549 m)   Wt 117 lb (53.1 kg)   SpO2 95%   BMI 22.11 kg/m²     General:  Awake, alert, oriented X 3. Well developed, well nourished, well groomed. No apparent distress. HEENT:  Normocephalic, atraumatic. Pupils equal, round, reactive to light. No scleral icterus. No conjunctival injection. Normal lips, teeth, and gums. No nasal discharge. Neck:  Supple  Heart: Reproducible chest pain RRR, no murmurs, gallops, rubs  Lungs:  CTA bilaterally, bilat symmetrical expansion, no wheeze, rales, or rhonchi  Abdomen:   Bowel sounds present, soft, nontender, no masses, no organomegaly, no peritoneal signs  Extremities:  No clubbing, cyanosis, or edema  Skin:  Warm and dry, no open lesions or rash  Neuro:  Cranial nerves 2-12 intact, no focal deficits  Breast: deferred  Rectal: deferred  Genitalia:  deferred    LABS:    CBC with Differential:    Lab Results   Component Value Date    WBC 5.5 01/05/2020    RBC 3.93 01/05/2020    HGB 12.0 01/05/2020    HCT 37.6 01/05/2020     01/05/2020    MCV 95.7 01/05/2020    MCH 30.5 01/05/2020    MCHC 31.9 01/05/2020    RDW 13.1 01/05/2020    SEGSPCT 48 08/22/2012    LYMPHOPCT 22.7 01/05/2020    MONOPCT 5.6 01/05/2020    BASOPCT 0.7 01/05/2020    MONOSABS 0.31 01/05/2020    LYMPHSABS 1.26 01/05/2020    EOSABS 0.15 01/05/2020    BASOSABS 0.04 01/05/2020     CMP:    Lab Results   Component Value Date     01/05/2020    K 4.4 01/05/2020     01/05/2020    CO2 22 01/05/2020    BUN 11 01/05/2020    CREATININE 0.9 01/05/2020    GFRAA >60 01/05/2020    LABGLOM 59 01/05/2020    GLUCOSE 110 01/05/2020    PROT 7.1 09/09/2019    LABALBU 4.7 09/09/2019    CALCIUM 9.7 01/05/2020    BILITOT 0.5 09/09/2019    ALKPHOS 89 09/09/2019    AST 13 09/09/2019    ALT 8 09/09/2019     Magnesium:    Lab Results   Component Value Date    MG 1.8 07/29/2018     Phosphorus:    Lab Results   Component Value Date    PHOS 4.4 09/24/2017     PT/INR:    Lab Results   Component Value Date    PROTIME 13.1 08/01/2018    INR 1.1 08/01/2018     Last 3 Troponin:    Lab Results   Component Value Date    TROPONINI <0.01 01/05/2020    TROPONINI <0.01 01/05/2020    TROPONINI <0.01 01/05/2020     U/A:    Lab Results   Component Value Date    COLORU Yellow 09/09/2019    PROTEINU Negative 09/09/2019    PHUR 7.0 09/09/2019    LABCAST FEW  08/22/2012    LABCAST RARE  08/22/2012    WBCUA 0-1 09/09/2019    RBCUA NONE 09/09/2019    RBCUA 1-3 08/22/2012    BACTERIA RARE 09/09/2019    CLARITYU Clear 09/09/2019    SPECGRAV 1.010 09/09/2019    LEUKOCYTESUR TRACE 09/09/2019    UROBILINOGEN 0.2 09/09/2019    BILIRUBINUR Negative 09/09/2019    BLOODU Negative 09/09/2019    GLUCOSEU Negative 09/09/2019     ABG:  No results found for: PH, PCO2, PO2, HCO3, BE, THGB, TCO2, O2SAT  HgBA1c:  No results found for: LABA1C  FLP:    Lab Results   Component Value Date    TRIG 115 09/24/2017    HDL 80 09/24/2017    LDLCALC 145 09/24/2017    LABVLDL 23 09/24/2017     TSH:    Lab Results   Component Value Date    TSH 3.120 09/24/2017       XR CHEST PORTABLE   Final Result   No acute cardiopulmonary disease process is identified. ASSESSMENT:      Principal Problem:    Chest pain  Active Problems:    IBS (irritable bowel syndrome)    COPD (chronic obstructive pulmonary disease) (HCC)    Acquired hypothyroidism  Resolved Problems:    * No resolved hospital problems. *      PLAN:    Observation monitored bed  assess for ACS-- negative   ASA  statin  cycle troponin negative   Fasting lipid panel  Cardiology consult appreciated- suspect noncardiac chest pain. No further cardiac evaluation.     Medications for other co morbidities cont as appropriate w dosage adjustments as necessary    DVT PPx  DC planning stable for discharge home with symptomatic treatment and follow-up as an outpatient with PCP-- tramadol      Electronically signed by Tyler Hickman MD on 1/5/2020 at 4:14 PM

## 2020-01-05 NOTE — CONSULTS
INPATIENT CARDIOLOGY CONSULT    Name: Gerhardt Manner    Age: 80 y.o. Date of Admission: 1/5/2020  4:41 AM    Date of Service: 1/5/2020    Reason for Consultation: Atypical chest pain, hypertension    Referring Physician: Marie Dumont DO    History of Present Illness: The patient is a 72-year-old white female known to [facility] with previous evaluation by Kaye Diana. He has a known history of hypertension and hyperlipidemia as well as that of mild aortic valve disease and a history of a known cerebral meningioma. She has most recently undergone objective assessment with an echocardiogram in June, 2018 demonstrating evidence of a normal size left ventricular chamber with normal left ventricular systolic function stage I diastolic dysfunction with mild aortic insufficiency. Continues to live independently and relates no active cardiovascular symptoms. On the morning of hospitalization, she awoke and special was awakened by midsternal and precordial chest pressure-like discomfort with a mild pleuritic component and transient intermittent paresthesias of her left upper extremity. She relates no additional radiation of the discomfort nor ischemic equivalents. At the time of emergency room evaluation following summoning emergency medical personnel, no electrocardiographic abnormalities were noted with initial and subsequent cardiac biomarkers demonstrating no evidence of abnormality. In addition to its above related pleuritic component without associated dyspnea, symptomatology is reproducible to palpation at the time of evaluation. She relates no additional manifestations of decompensated left ventricular systolic dysfunction or volume overload nor arrhythmia related manifestations and a chest x-ray while in the emergency room demonstrated no evidence of cardiomegaly or infiltrate. .    Review of Systems:    The remainder of a complete multisystem review including consitutional, central nervous, respiratory, circulatory, gastrointestinal, genitourinary, endocrinologic, hematologic, musculoskeletal and psychiatric are negative. Past Medical History:  Past Medical History:   Diagnosis Date    Acid reflux disease     Arthritis     Chronic back pain     COPD (chronic obstructive pulmonary disease) (HCC)     Hearing loss     Hyperlipidemia     Irritable bowel     Meningioma ([facility] [address] 05/2018    Migraine     Neuropathy     Osteoarthritis     Osteoporosis     Thyroid disease     hypo    TIA (transient ischemic attack) 05/2018       Past Surgical History:  Past Surgical History:   Procedure Laterality Date    ANKLE SURGERY  1980    pin placement    APPENDECTOMY      BLEPHAROPLASTY      BREAST BIOPSY  1978    BREAST SURGERY      biopsy bilat     CHOLECYSTECTOMY      COLONOSCOPY      ENDOSCOPY, COLON, DIAGNOSTIC  8/2012    ERCP  10/31/2012    ERCP  12/05/2012    stent removal    EYE SURGERY      cataract    FIXATION KYPHOPLASTY  08/03/2016    LUMBAR 2    FRACTURE SURGERY      left ankle, pin placement    HYSTERECTOMY      UT OFFICE/OUTPT VISIT,PROCEDURE ONLY N/A 8/1/2018    KYPHOPLASTY  T11  -- TIME UNDETERMINED performed by Corrine Valentino DO at [facility] 22 THYROID SURGERY      biopsy    TONSILLECTOMY         Family History:  Family History   Problem Relation Age of Onset    Cancer Mother         leukemia    Emphysema Father     Rheum Arthritis Sister     Cancer Brother     Cancer Brother     Heart Disease Brother        Social History:  Social History     Socioeconomic History    Marital status:       Spouse name: Not on file    Number of children: Not on file    Years of education: Not on file    Highest education level: Not on file   Occupational History    Not on file   Social Needs    Financial resource strain: Not on file    Food insecurity:     Worry: Not on file     Inability: Not on file    Transportation needs:     Medical: Not on file     Non-medical: Not (54.9 kg)   06/18/19 121 lb 9.6 oz (55.2 kg)     The patient is awake, alert and in no discomfort or distress. No gross musculoskeletal deformity or lymphadenopathy are present. No significant skin or nail changes are present. Gross examination of head, eyes, nose and throat are negative. Jugular venous pressure is normal and no carotid bruits are present. No thyromegaly is noted. Normal respiratory effort is noted with no accessory muscle usage present. Lung fields are clear to ascultation. The patient's chest discomfort is reproducible with palpation. Cardiac examination is notable for a regular rate and rhythm with no palpable thrill. No gallop rhythm pericardial rub or cardiac murmur are identified. A benign abdominal examination is present with no masses or organomegaly. A normal abdominal aortic pulsation is present. Intact pulses are present throughout all extremities and no peripheral edema is present. No focal neurologic deficits are present. Intake/Output:  No intake or output data in the 24 hours ending 01/05/20 1014  No intake/output data recorded. Laboratory Tests:  Lab Results   Component Value Date    CREATININE 0.9 01/05/2020    BUN 11 01/05/2020     01/05/2020    K 4.4 01/05/2020     01/05/2020    CO2 22 01/05/2020     No results for input(s): CKTOTAL, CKMB in the last 72 hours. Invalid input(s): TROPONONI  No results found for: BNP  Lab Results   Component Value Date    WBC 5.5 01/05/2020    RBC 3.93 01/05/2020    HGB 12.0 01/05/2020    HCT 37.6 01/05/2020    MCV 95.7 01/05/2020    MCH 30.5 01/05/2020    MCHC 31.9 01/05/2020    RDW 13.1 01/05/2020     01/05/2020    MPV 9.6 01/05/2020     No results for input(s): ALKPHOS, ALT, AST, PROT, BILITOT, BILIDIR, LABALBU in the last 72 hours.   Lab Results   Component Value Date    MG 1.8 07/29/2018     Lab Results   Component Value Date    PROTIME 13.1 08/01/2018    INR 1.1 08/01/2018     Lab Results   Component Value Date

## 2020-01-05 NOTE — ED NOTES
Bed: 13  Expected date:   Expected time:   Means of arrival:   Comments:  Barney Sidhu, RN  01/05/20 [id number]

## 2020-01-05 NOTE — ED PROVIDER NOTES
patients home medications have been reviewed. Allergies: Amlodipine besylate; Bentyl [dicyclomine hcl]; Codeine; Lactose intolerance (gi); Mobic [meloxicam]; Monosodium glutamate; Other; Pantoprazole; Prednisone; and Talwin [pentazocine]        ---------------------------------------------------PHYSICAL EXAM--------------------------------------    Constitutional/General: Alert and oriented x3, well appearing, non toxic in NAD  Head: Normocephalic and atraumatic  Eyes: PERRL, EOMI, conjunctive normal, sclera non icteric  Mouth: Oropharynx clear, handling secretions, no trismus, no asymmetry of the posterior oropharynx or uvular edema  Neck: Supple, full ROM, non tender to palpation in the midline, no stridor, no crepitus, no meningeal signs  Respiratory: Lungs clear to auscultation bilaterally, no wheezes, rales, or rhonchi. Not in respiratory distress  Cardiovascular:  Regular rate. Regular rhythm. No murmurs, gallops, or rubs. 2+ distal pulses  GI:  Abdomen Soft, Non tender, Non distended. +BS. No organomegaly, no palpable masses,  No rebound, guarding, or rigidity. Musculoskeletal: Moves all extremities x 4. Warm and well perfused, no clubbing, cyanosis, or edema. Capillary refill <3 seconds  Integument: skin warm and dry. No rashes. Lymphatic: no lymphadenopathy noted  Neurologic: GCS 15, no focal deficits,  Psychiatric: Normal Affect    -------------------------------------------------- RESULTS -------------------------------------------------  I have personally reviewed all laboratory and imaging results for this patient. Results are listed below.      LABS:  Results for orders placed or performed during the hospital encounter of 01/05/20   CBC Auto Differential   Result Value Ref Range    WBC 5.5 4.5 - 11.5 E9/L    RBC 3.93 3.50 - 5.50 E12/L    Hemoglobin 12.0 11.5 - 15.5 g/dL    Hematocrit 37.6 34.0 - 48.0 %    MCV 95.7 80.0 - 99.9 fL    MCH 30.5 26.0 - 35.0 pg    MCHC 31.9 (L) 32.0 - 34.5 %    RDW 13.1 11.5 - 15.0 fL    Platelets 289 090 - 614 E9/L    MPV 9.6 7.0 - 12.0 fL    Neutrophils % 67.8 43.0 - 80.0 %    Immature Granulocytes % 0.5 0.0 - 5.0 %    Lymphocytes % 22.7 20.0 - 42.0 %    Monocytes % 5.6 2.0 - 12.0 %    Eosinophils % 2.7 0.0 - 6.0 %    Basophils % 0.7 0.0 - 2.0 %    Neutrophils Absolute 3.75 1.80 - 7.30 E9/L    Immature Granulocytes # 0.03 E9/L    Lymphocytes Absolute 1.26 (L) 1.50 - 4.00 E9/L    Monocytes Absolute 0.31 0.10 - 0.95 E9/L    Eosinophils Absolute 0.15 0.05 - 0.50 E9/L    Basophils Absolute 0.04 0.00 - 0.20 C3/Q   Basic Metabolic Panel w/ Reflex to MG   Result Value Ref Range    Sodium 139 132 - 146 mmol/L    Potassium reflex Magnesium 4.4 3.5 - 5.0 mmol/L    Chloride 106 98 - 107 mmol/L    CO2 22 22 - 29 mmol/L    Anion Gap 11 7 - 16 mmol/L    Glucose 110 (H) 74 - 99 mg/dL    BUN 11 8 - 23 mg/dL    CREATININE 0.9 0.5 - 1.0 mg/dL    GFR Non-African American 59 >=60 mL/min/1.73    GFR African American >60     Calcium 9.7 8.6 - 10.2 mg/dL   Troponin   Result Value Ref Range    Troponin <0.01 0.00 - 0.03 ng/mL   Brain Natriuretic Peptide   Result Value Ref Range    Pro- 0 - 450 pg/mL       RADIOLOGY:  Interpreted by Radiologist.  XR CHEST PORTABLE    (Results Pending)       EKG: This EKG is signed and interpreted by the EP. EKG shows normal sinus rhythm at 80 bpm.  Normal axis. Normal QRS. No ST elevation or depression. No STEMI. Nonspecific ST and T wave changes noted in the anterior leads. ------------------------- NURSING NOTES AND VITALS REVIEWED ---------------------------   The nursing notes within the ED encounter and vital signs as below have been reviewed by myself. BP (!) 150/68   Pulse 69   Resp 18   Ht 5' 1\" (1.549 m)   Wt 117 lb (53.1 kg)   SpO2 95%   BMI 22.11 kg/m²   Oxygen Saturation Interpretation: Normal    The patients available past medical records and past encounters were reviewed.         ------------------------------ ED COURSE/MEDICAL

## 2020-01-28 NOTE — DISCHARGE SUMMARY
Physician Discharge Summary     Patient ID:  Areatha Phoenix  [id number]  80 y.o.  12/22/1927    Admit date: 1/5/2020    Discharge date and time:  1/5/2020    Discharge Diagnoses: Principal Problem:    Chest pain  Active Problems:    IBS (irritable bowel syndrome)    COPD (chronic obstructive pulmonary disease) (HCC)    Acquired hypothyroidism    GERD (gastroesophageal reflux disease)  Resolved Problems:    * No resolved hospital problems.  *      Consults: IP CONSULT TO INTERNAL MEDICINE  IP CONSULT TO CARDIOLOGY    See H&P for <48 hr D/C  Stable      Signed:  Peter Solitario MD  1/28/2020  4:34 PM

## 2020-03-06 ENCOUNTER — HOSPITAL ENCOUNTER (INPATIENT)
Age: 85
LOS: 4 days | Discharge: NURSING, REHAB OR LONG-TERM CARE | DRG: 563 | End: 2020-03-10
Attending: EMERGENCY MEDICINE | Admitting: INTERNAL MEDICINE
Payer: MEDICARE

## 2020-03-06 ENCOUNTER — APPOINTMENT (OUTPATIENT)
Dept: CT IMAGING | Age: 85
DRG: 563 | End: 2020-03-06
Payer: MEDICARE

## 2020-03-06 ENCOUNTER — APPOINTMENT (OUTPATIENT)
Dept: GENERAL RADIOLOGY | Age: 85
DRG: 563 | End: 2020-03-06
Payer: MEDICARE

## 2020-03-06 PROBLEM — S42.202A CLOSED FRACTURE OF PROXIMAL END OF LEFT HUMERUS, INITIAL ENCOUNTER: Status: ACTIVE | Noted: 2020-03-06

## 2020-03-06 LAB
ALBUMIN SERPL-MCNC: 4.3 G/DL (ref 3.5–5.2)
ALP BLD-CCNC: 76 U/L (ref 35–104)
ALT SERPL-CCNC: 10 U/L (ref 0–32)
ANION GAP SERPL CALCULATED.3IONS-SCNC: 13 MMOL/L (ref 7–16)
APTT: 29.9 SEC (ref 24.5–35.1)
AST SERPL-CCNC: 16 U/L (ref 0–31)
BACTERIA: ABNORMAL /HPF
BASOPHILS ABSOLUTE: 0.03 E9/L (ref 0–0.2)
BASOPHILS RELATIVE PERCENT: 0.3 % (ref 0–2)
BILIRUB SERPL-MCNC: 0.4 MG/DL (ref 0–1.2)
BILIRUBIN URINE: NEGATIVE
BLOOD, URINE: ABNORMAL
BUN BLDV-MCNC: 17 MG/DL (ref 8–23)
CALCIUM SERPL-MCNC: 9.1 MG/DL (ref 8.6–10.2)
CHLORIDE BLD-SCNC: 98 MMOL/L (ref 98–107)
CLARITY: CLEAR
CO2: 21 MMOL/L (ref 22–29)
COLOR: YELLOW
CREAT SERPL-MCNC: 0.9 MG/DL (ref 0.5–1)
EOSINOPHILS ABSOLUTE: 0.01 E9/L (ref 0.05–0.5)
EOSINOPHILS RELATIVE PERCENT: 0.1 % (ref 0–6)
GFR AFRICAN AMERICAN: >60
GFR NON-AFRICAN AMERICAN: 59 ML/MIN/1.73
GLUCOSE BLD-MCNC: 140 MG/DL (ref 74–99)
GLUCOSE URINE: NEGATIVE MG/DL
HCT VFR BLD CALC: 36.2 % (ref 34–48)
HEMOGLOBIN: 11.6 G/DL (ref 11.5–15.5)
IMMATURE GRANULOCYTES #: 0.05 E9/L
IMMATURE GRANULOCYTES %: 0.5 % (ref 0–5)
INR BLD: 1.1
KETONES, URINE: NEGATIVE MG/DL
LEUKOCYTE ESTERASE, URINE: ABNORMAL
LYMPHOCYTES ABSOLUTE: 0.88 E9/L (ref 1.5–4)
LYMPHOCYTES RELATIVE PERCENT: 9.4 % (ref 20–42)
MCH RBC QN AUTO: 30.7 PG (ref 26–35)
MCHC RBC AUTO-ENTMCNC: 32 % (ref 32–34.5)
MCV RBC AUTO: 95.8 FL (ref 80–99.9)
METER GLUCOSE: 158 MG/DL (ref 74–99)
MONOCYTES ABSOLUTE: 0.25 E9/L (ref 0.1–0.95)
MONOCYTES RELATIVE PERCENT: 2.7 % (ref 2–12)
NEUTROPHILS ABSOLUTE: 8.18 E9/L (ref 1.8–7.3)
NEUTROPHILS RELATIVE PERCENT: 87 % (ref 43–80)
NITRITE, URINE: NEGATIVE
PDW BLD-RTO: 13 FL (ref 11.5–15)
PH UA: 6.5 (ref 5–9)
PLATELET # BLD: 199 E9/L (ref 130–450)
PMV BLD AUTO: 9.4 FL (ref 7–12)
POTASSIUM SERPL-SCNC: 4.4 MMOL/L (ref 3.5–5)
PROTEIN UA: NEGATIVE MG/DL
PROTHROMBIN TIME: 12.8 SEC (ref 9.3–12.4)
RBC # BLD: 3.78 E12/L (ref 3.5–5.5)
RBC UA: ABNORMAL /HPF (ref 0–2)
SODIUM BLD-SCNC: 132 MMOL/L (ref 132–146)
SPECIFIC GRAVITY UA: 1.02 (ref 1–1.03)
TOTAL PROTEIN: 6.4 G/DL (ref 6.4–8.3)
UROBILINOGEN, URINE: 0.2 E.U./DL
WBC # BLD: 9.4 E9/L (ref 4.5–11.5)
WBC UA: ABNORMAL /HPF (ref 0–5)

## 2020-03-06 PROCEDURE — 96374 THER/PROPH/DIAG INJ IV PUSH: CPT

## 2020-03-06 PROCEDURE — 70486 CT MAXILLOFACIAL W/O DYE: CPT

## 2020-03-06 PROCEDURE — 6360000002 HC RX W HCPCS: Performed by: EMERGENCY MEDICINE

## 2020-03-06 PROCEDURE — 80053 COMPREHEN METABOLIC PANEL: CPT

## 2020-03-06 PROCEDURE — 73030 X-RAY EXAM OF SHOULDER: CPT

## 2020-03-06 PROCEDURE — 72170 X-RAY EXAM OF PELVIS: CPT

## 2020-03-06 PROCEDURE — 82962 GLUCOSE BLOOD TEST: CPT

## 2020-03-06 PROCEDURE — 72125 CT NECK SPINE W/O DYE: CPT

## 2020-03-06 PROCEDURE — 1200000000 HC SEMI PRIVATE

## 2020-03-06 PROCEDURE — 99223 1ST HOSP IP/OBS HIGH 75: CPT | Performed by: INTERNAL MEDICINE

## 2020-03-06 PROCEDURE — 70450 CT HEAD/BRAIN W/O DYE: CPT

## 2020-03-06 PROCEDURE — 85730 THROMBOPLASTIN TIME PARTIAL: CPT

## 2020-03-06 PROCEDURE — 6370000000 HC RX 637 (ALT 250 FOR IP): Performed by: INTERNAL MEDICINE

## 2020-03-06 PROCEDURE — 90715 TDAP VACCINE 7 YRS/> IM: CPT | Performed by: EMERGENCY MEDICINE

## 2020-03-06 PROCEDURE — 6370000000 HC RX 637 (ALT 250 FOR IP): Performed by: EMERGENCY MEDICINE

## 2020-03-06 PROCEDURE — 94664 DEMO&/EVAL PT USE INHALER: CPT

## 2020-03-06 PROCEDURE — 2500000003 HC RX 250 WO HCPCS: Performed by: EMERGENCY MEDICINE

## 2020-03-06 PROCEDURE — 73110 X-RAY EXAM OF WRIST: CPT

## 2020-03-06 PROCEDURE — 99285 EMERGENCY DEPT VISIT HI MDM: CPT

## 2020-03-06 PROCEDURE — 6360000002 HC RX W HCPCS: Performed by: INTERNAL MEDICINE

## 2020-03-06 PROCEDURE — 81001 URINALYSIS AUTO W/SCOPE: CPT

## 2020-03-06 PROCEDURE — 0HQ1XZZ REPAIR FACE SKIN, EXTERNAL APPROACH: ICD-10-PCS | Performed by: EMERGENCY MEDICINE

## 2020-03-06 PROCEDURE — 71045 X-RAY EXAM CHEST 1 VIEW: CPT

## 2020-03-06 PROCEDURE — 73562 X-RAY EXAM OF KNEE 3: CPT

## 2020-03-06 PROCEDURE — 2580000003 HC RX 258: Performed by: INTERNAL MEDICINE

## 2020-03-06 PROCEDURE — 90471 IMMUNIZATION ADMIN: CPT | Performed by: EMERGENCY MEDICINE

## 2020-03-06 PROCEDURE — 85610 PROTHROMBIN TIME: CPT

## 2020-03-06 PROCEDURE — 96372 THER/PROPH/DIAG INJ SC/IM: CPT

## 2020-03-06 PROCEDURE — 85025 COMPLETE CBC W/AUTO DIFF WBC: CPT

## 2020-03-06 RX ORDER — MORPHINE SULFATE 2 MG/ML
2 INJECTION, SOLUTION INTRAMUSCULAR; INTRAVENOUS ONCE
Status: COMPLETED | OUTPATIENT
Start: 2020-03-06 | End: 2020-03-06

## 2020-03-06 RX ORDER — ACETAMINOPHEN 325 MG/1
650 TABLET ORAL ONCE
Status: COMPLETED | OUTPATIENT
Start: 2020-03-06 | End: 2020-03-06

## 2020-03-06 RX ORDER — POLYETHYLENE GLYCOL 3350 17 G/17G
17 POWDER, FOR SOLUTION ORAL DAILY PRN
Status: DISCONTINUED | OUTPATIENT
Start: 2020-03-06 | End: 2020-03-10 | Stop reason: HOSPADM

## 2020-03-06 RX ORDER — TRAMADOL HYDROCHLORIDE 50 MG/1
50 TABLET ORAL EVERY 6 HOURS PRN
Status: DISCONTINUED | OUTPATIENT
Start: 2020-03-06 | End: 2020-03-07

## 2020-03-06 RX ORDER — TRAMADOL HYDROCHLORIDE 50 MG/1
50 TABLET ORAL EVERY 6 HOURS PRN
COMMUNITY
End: 2020-06-24

## 2020-03-06 RX ORDER — ACETAMINOPHEN 325 MG/1
650 TABLET ORAL ONCE
Status: DISCONTINUED | OUTPATIENT
Start: 2020-03-06 | End: 2020-03-06

## 2020-03-06 RX ORDER — CELECOXIB 100 MG/1
200 CAPSULE ORAL 2 TIMES DAILY
Status: DISCONTINUED | OUTPATIENT
Start: 2020-03-06 | End: 2020-03-10 | Stop reason: HOSPADM

## 2020-03-06 RX ORDER — SUMATRIPTAN 50 MG/1
50 TABLET, FILM COATED ORAL
COMMUNITY
End: 2020-06-24

## 2020-03-06 RX ORDER — ACETAMINOPHEN 325 MG/1
650 TABLET ORAL EVERY 6 HOURS PRN
Status: DISCONTINUED | OUTPATIENT
Start: 2020-03-06 | End: 2020-03-10 | Stop reason: HOSPADM

## 2020-03-06 RX ORDER — ACETAMINOPHEN 650 MG/1
650 SUPPOSITORY RECTAL EVERY 6 HOURS PRN
Status: DISCONTINUED | OUTPATIENT
Start: 2020-03-06 | End: 2020-03-10 | Stop reason: HOSPADM

## 2020-03-06 RX ORDER — FENTANYL CITRATE 50 UG/ML
25 INJECTION, SOLUTION INTRAMUSCULAR; INTRAVENOUS ONCE
Status: COMPLETED | OUTPATIENT
Start: 2020-03-06 | End: 2020-03-06

## 2020-03-06 RX ORDER — LEVOTHYROXINE SODIUM 0.05 MG/1
50 TABLET ORAL DAILY
Status: DISCONTINUED | OUTPATIENT
Start: 2020-03-07 | End: 2020-03-10 | Stop reason: HOSPADM

## 2020-03-06 RX ORDER — SODIUM CHLORIDE 0.9 % (FLUSH) 0.9 %
10 SYRINGE (ML) INJECTION EVERY 12 HOURS SCHEDULED
Status: DISCONTINUED | OUTPATIENT
Start: 2020-03-06 | End: 2020-03-10 | Stop reason: HOSPADM

## 2020-03-06 RX ORDER — BUDESONIDE 0.5 MG/2ML
0.5 INHALANT ORAL 2 TIMES DAILY
Status: DISCONTINUED | OUTPATIENT
Start: 2020-03-06 | End: 2020-03-10 | Stop reason: HOSPADM

## 2020-03-06 RX ORDER — ONDANSETRON 2 MG/ML
4 INJECTION INTRAMUSCULAR; INTRAVENOUS EVERY 6 HOURS PRN
Status: DISCONTINUED | OUTPATIENT
Start: 2020-03-06 | End: 2020-03-10 | Stop reason: HOSPADM

## 2020-03-06 RX ORDER — ONDANSETRON 4 MG/1
4 TABLET, ORALLY DISINTEGRATING ORAL ONCE
Status: COMPLETED | OUTPATIENT
Start: 2020-03-06 | End: 2020-03-06

## 2020-03-06 RX ORDER — SODIUM CHLORIDE 0.9 % (FLUSH) 0.9 %
10 SYRINGE (ML) INJECTION PRN
Status: DISCONTINUED | OUTPATIENT
Start: 2020-03-06 | End: 2020-03-10 | Stop reason: HOSPADM

## 2020-03-06 RX ORDER — PANTOPRAZOLE SODIUM 40 MG/1
40 TABLET, DELAYED RELEASE ORAL
Status: DISCONTINUED | OUTPATIENT
Start: 2020-03-07 | End: 2020-03-10 | Stop reason: HOSPADM

## 2020-03-06 RX ORDER — CELECOXIB 200 MG/1
200 CAPSULE ORAL DAILY
Status: ON HOLD | COMMUNITY
End: 2022-02-09 | Stop reason: HOSPADM

## 2020-03-06 RX ORDER — LIDOCAINE HYDROCHLORIDE AND EPINEPHRINE 10; 10 MG/ML; UG/ML
20 INJECTION, SOLUTION INFILTRATION; PERINEURAL ONCE
Status: COMPLETED | OUTPATIENT
Start: 2020-03-06 | End: 2020-03-06

## 2020-03-06 RX ORDER — PROMETHAZINE HYDROCHLORIDE 25 MG/1
12.5 TABLET ORAL EVERY 6 HOURS PRN
Status: DISCONTINUED | OUTPATIENT
Start: 2020-03-06 | End: 2020-03-10 | Stop reason: HOSPADM

## 2020-03-06 RX ADMIN — LIDOCAINE HYDROCHLORIDE,EPINEPHRINE BITARTRATE 20 ML: 10; .01 INJECTION, SOLUTION INFILTRATION; PERINEURAL at 18:50

## 2020-03-06 RX ADMIN — TETANUS TOXOID, REDUCED DIPHTHERIA TOXOID AND ACELLULAR PERTUSSIS VACCINE, ADSORBED 0.5 ML: 5; 2.5; 8; 8; 2.5 SUSPENSION INTRAMUSCULAR at 13:18

## 2020-03-06 RX ADMIN — Medication 10 ML: at 21:45

## 2020-03-06 RX ADMIN — BUDESONIDE 500 MCG: 0.5 SUSPENSION RESPIRATORY (INHALATION) at 21:15

## 2020-03-06 RX ADMIN — CELECOXIB 200 MG: 100 CAPSULE ORAL at 21:36

## 2020-03-06 RX ADMIN — FENTANYL CITRATE 25 MCG: 50 INJECTION, SOLUTION INTRAMUSCULAR; INTRAVENOUS at 13:17

## 2020-03-06 RX ADMIN — MORPHINE SULFATE 2 MG: 2 INJECTION, SOLUTION INTRAMUSCULAR; INTRAVENOUS at 16:27

## 2020-03-06 RX ADMIN — ONDANSETRON 4 MG: 4 TABLET, ORALLY DISINTEGRATING ORAL at 13:15

## 2020-03-06 RX ADMIN — TRAMADOL HYDROCHLORIDE 50 MG: 50 TABLET, FILM COATED ORAL at 21:36

## 2020-03-06 RX ADMIN — ENOXAPARIN SODIUM 30 MG: 30 INJECTION SUBCUTANEOUS at 21:45

## 2020-03-06 RX ADMIN — ACETAMINOPHEN 650 MG: 325 TABLET ORAL at 13:22

## 2020-03-06 ASSESSMENT — ENCOUNTER SYMPTOMS
NAUSEA: 0
BACK PAIN: 0
VOMITING: 0
SHORTNESS OF BREATH: 0
ABDOMINAL PAIN: 0
VISUAL CHANGE: 0
SINUS PAIN: 0

## 2020-03-06 ASSESSMENT — PAIN SCALES - GENERAL
PAINLEVEL_OUTOF10: 8
PAINLEVEL_OUTOF10: 10
PAINLEVEL_OUTOF10: 6
PAINLEVEL_OUTOF10: 10
PAINLEVEL_OUTOF10: 7
PAINLEVEL_OUTOF10: 7

## 2020-03-06 ASSESSMENT — PAIN DESCRIPTION - PAIN TYPE
TYPE: ACUTE PAIN
TYPE: ACUTE PAIN

## 2020-03-06 ASSESSMENT — PAIN DESCRIPTION - LOCATION
LOCATION: SHOULDER
LOCATION: SHOULDER;ARM

## 2020-03-06 ASSESSMENT — PAIN DESCRIPTION - ORIENTATION
ORIENTATION: LEFT
ORIENTATION: LEFT

## 2020-03-06 NOTE — H&P
[facility] History and Physical      CHIEF COMPLAINT: [place] in the mall. History of Present Illness: This is 80-year-old  female with past medical history of hypothyroid, chronic pain, GERD, Asthma, brought in here by EMS due to fall in the mall today afternoon. She is accompanied with her daughter in the [facility] suddenly she hit the curb with her left foot and she landed on her left shoulder. She was having pain since then of the left shoulder she denies any loss of consciousness during the episode. She brought in here and ER work-up shows left shoulder humerus tuberosity fracture. History taken from the patient she is alert oriented and going historian. Denies any shortness of breath or chest pain. Is in ER blood pressure 159/88 pulse 82 respiratory 16 and temperature 98 oxygen saturation 96% at room air. Work in [place] [address] shows sodium 132 potassium 4.4 WBC count 9.4 hemoglobin 11.6. She did not give any history of abdominal pain, nausea/vomiting/fever.     Informant(s) for H&P: Patient  REVIEW OF SYSTEMS:  A comprehensive review of systems was negative except for: what is in the HPI      PMH:  Past Medical History:   Diagnosis Date    Acid reflux disease     Arthritis     Chronic back pain     COPD (chronic obstructive pulmonary disease) ([address]     Hearing loss     Hyperlipidemia     Irritable bowel     Meningioma ([address] 05/2018    Migraine     Neuropathy     Osteoarthritis     Osteoporosis     Thyroid disease     hypo    TIA (transient ischemic attack) 05/2018       Surgical History:  Past Surgical History:   Procedure Laterality Date    ANKLE SURGERY  1980    pin placement    APPENDECTOMY      BLEPHAROPLASTY      BREAST BIOPSY  1978    BREAST SURGERY      biopsy bilat     CHOLECYSTECTOMY      COLONOSCOPY      ENDOSCOPY, COLON, DIAGNOSTIC  8/2012    ERCP  10/31/2012    ERCP  12/05/2012    stent removal    EYE SURGERY      cataract    FIXATION

## 2020-03-06 NOTE — ED NOTES
Report to [facility], care of patient relinquished.       Stuart Jaimes RN  03/06/20 [id number]

## 2020-03-06 NOTE — ED NOTES
Bed:  [place] [address]  Expected date:   Expected time:   Means of arrival:   Comments:  Elena Radford RN  03/06/20 [id number]

## 2020-03-07 LAB
ANION GAP SERPL CALCULATED.3IONS-SCNC: 12 MMOL/L (ref 7–16)
BUN BLDV-MCNC: 24 MG/DL (ref 8–23)
CALCIUM SERPL-MCNC: 9.1 MG/DL (ref 8.6–10.2)
CHLORIDE BLD-SCNC: 97 MMOL/L (ref 98–107)
CO2: 21 MMOL/L (ref 22–29)
CREAT SERPL-MCNC: 1.3 MG/DL (ref 0.5–1)
GFR AFRICAN AMERICAN: 46
GFR NON-AFRICAN AMERICAN: 38 ML/MIN/1.73
GLUCOSE BLD-MCNC: 133 MG/DL (ref 74–99)
HCT VFR BLD CALC: 31.7 % (ref 34–48)
HEMOGLOBIN: 10.1 G/DL (ref 11.5–15.5)
MCH RBC QN AUTO: 30.7 PG (ref 26–35)
MCHC RBC AUTO-ENTMCNC: 31.9 % (ref 32–34.5)
MCV RBC AUTO: 96.4 FL (ref 80–99.9)
PDW BLD-RTO: 13.3 FL (ref 11.5–15)
PLATELET # BLD: 197 E9/L (ref 130–450)
PMV BLD AUTO: 9.4 FL (ref 7–12)
POTASSIUM REFLEX MAGNESIUM: 4.8 MMOL/L (ref 3.5–5)
RBC # BLD: 3.29 E12/L (ref 3.5–5.5)
SODIUM BLD-SCNC: 130 MMOL/L (ref 132–146)
WBC # BLD: 11.3 E9/L (ref 4.5–11.5)

## 2020-03-07 PROCEDURE — 99232 SBSQ HOSP IP/OBS MODERATE 35: CPT | Performed by: INTERNAL MEDICINE

## 2020-03-07 PROCEDURE — 94640 AIRWAY INHALATION TREATMENT: CPT

## 2020-03-07 PROCEDURE — 2580000003 HC RX 258: Performed by: INTERNAL MEDICINE

## 2020-03-07 PROCEDURE — 1200000000 HC SEMI PRIVATE

## 2020-03-07 PROCEDURE — 6360000002 HC RX W HCPCS: Performed by: INTERNAL MEDICINE

## 2020-03-07 PROCEDURE — 85027 COMPLETE CBC AUTOMATED: CPT

## 2020-03-07 PROCEDURE — 80048 BASIC METABOLIC PNL TOTAL CA: CPT

## 2020-03-07 PROCEDURE — 6360000002 HC RX W HCPCS

## 2020-03-07 PROCEDURE — 6370000000 HC RX 637 (ALT 250 FOR IP): Performed by: INTERNAL MEDICINE

## 2020-03-07 PROCEDURE — 36415 COLL VENOUS BLD VENIPUNCTURE: CPT

## 2020-03-07 RX ORDER — DIPHENHYDRAMINE HYDROCHLORIDE 50 MG/ML
INJECTION INTRAMUSCULAR; INTRAVENOUS
Status: COMPLETED
Start: 2020-03-07 | End: 2020-03-07

## 2020-03-07 RX ORDER — 0.9 % SODIUM CHLORIDE 0.9 %
500 INTRAVENOUS SOLUTION INTRAVENOUS ONCE
Status: COMPLETED | OUTPATIENT
Start: 2020-03-07 | End: 2020-03-07

## 2020-03-07 RX ORDER — METHYLPREDNISOLONE SODIUM SUCCINATE 40 MG/ML
40 INJECTION, POWDER, LYOPHILIZED, FOR SOLUTION INTRAMUSCULAR; INTRAVENOUS ONCE
Status: COMPLETED | OUTPATIENT
Start: 2020-03-07 | End: 2020-03-07

## 2020-03-07 RX ORDER — METHYLPREDNISOLONE SODIUM SUCCINATE 40 MG/ML
INJECTION, POWDER, LYOPHILIZED, FOR SOLUTION INTRAMUSCULAR; INTRAVENOUS
Status: COMPLETED
Start: 2020-03-07 | End: 2020-03-07

## 2020-03-07 RX ORDER — MORPHINE SULFATE 2 MG/ML
2 INJECTION, SOLUTION INTRAMUSCULAR; INTRAVENOUS
Status: DISCONTINUED | OUTPATIENT
Start: 2020-03-07 | End: 2020-03-10 | Stop reason: HOSPADM

## 2020-03-07 RX ORDER — DIPHENHYDRAMINE HYDROCHLORIDE 50 MG/ML
25 INJECTION INTRAMUSCULAR; INTRAVENOUS ONCE
Status: COMPLETED | OUTPATIENT
Start: 2020-03-07 | End: 2020-03-07

## 2020-03-07 RX ADMIN — MORPHINE SULFATE 2 MG: 2 INJECTION, SOLUTION INTRAMUSCULAR; INTRAVENOUS at 20:30

## 2020-03-07 RX ADMIN — DIPHENHYDRAMINE HYDROCHLORIDE 25 MG: 50 INJECTION, SOLUTION INTRAMUSCULAR; INTRAVENOUS at 00:45

## 2020-03-07 RX ADMIN — Medication 10 ML: at 09:07

## 2020-03-07 RX ADMIN — CELECOXIB 200 MG: 100 CAPSULE ORAL at 08:54

## 2020-03-07 RX ADMIN — ONDANSETRON 4 MG: 2 INJECTION INTRAMUSCULAR; INTRAVENOUS at 12:12

## 2020-03-07 RX ADMIN — ONDANSETRON 4 MG: 2 INJECTION INTRAMUSCULAR; INTRAVENOUS at 04:32

## 2020-03-07 RX ADMIN — ACETAMINOPHEN 650 MG: 325 TABLET ORAL at 04:37

## 2020-03-07 RX ADMIN — LEVOTHYROXINE SODIUM 50 MCG: 50 TABLET ORAL at 09:03

## 2020-03-07 RX ADMIN — MORPHINE SULFATE 2 MG: 2 INJECTION, SOLUTION INTRAMUSCULAR; INTRAVENOUS at 12:00

## 2020-03-07 RX ADMIN — MORPHINE SULFATE 2 MG: 2 INJECTION, SOLUTION INTRAMUSCULAR; INTRAVENOUS at 04:36

## 2020-03-07 RX ADMIN — BUDESONIDE 500 MCG: 0.5 SUSPENSION RESPIRATORY (INHALATION) at 20:45

## 2020-03-07 RX ADMIN — ONDANSETRON 4 MG: 2 INJECTION INTRAMUSCULAR; INTRAVENOUS at 20:30

## 2020-03-07 RX ADMIN — CELECOXIB 200 MG: 100 CAPSULE ORAL at 20:30

## 2020-03-07 RX ADMIN — BUDESONIDE 500 MCG: 0.5 SUSPENSION RESPIRATORY (INHALATION) at 08:45

## 2020-03-07 RX ADMIN — DIPHENHYDRAMINE HYDROCHLORIDE 25 MG: 50 INJECTION INTRAMUSCULAR; INTRAVENOUS at 00:45

## 2020-03-07 RX ADMIN — Medication 10 ML: at 20:33

## 2020-03-07 RX ADMIN — ACETAMINOPHEN 650 MG: 325 TABLET ORAL at 20:30

## 2020-03-07 RX ADMIN — METHYLPREDNISOLONE SODIUM SUCCINATE 40 MG: 40 INJECTION, POWDER, LYOPHILIZED, FOR SOLUTION INTRAMUSCULAR; INTRAVENOUS at 03:00

## 2020-03-07 RX ADMIN — ENOXAPARIN SODIUM 30 MG: 30 INJECTION SUBCUTANEOUS at 08:55

## 2020-03-07 RX ADMIN — SODIUM CHLORIDE 500 ML: 9 INJECTION, SOLUTION INTRAVENOUS at 02:55

## 2020-03-07 ASSESSMENT — PAIN DESCRIPTION - PROGRESSION
CLINICAL_PROGRESSION: NOT CHANGED

## 2020-03-07 ASSESSMENT — PAIN SCALES - GENERAL
PAINLEVEL_OUTOF10: 8
PAINLEVEL_OUTOF10: 7
PAINLEVEL_OUTOF10: 8
PAINLEVEL_OUTOF10: 2
PAINLEVEL_OUTOF10: 8
PAINLEVEL_OUTOF10: 9
PAINLEVEL_OUTOF10: 5

## 2020-03-07 ASSESSMENT — PAIN - FUNCTIONAL ASSESSMENT
PAIN_FUNCTIONAL_ASSESSMENT: PREVENTS OR INTERFERES WITH ALL ACTIVE AND SOME PASSIVE ACTIVITIES
PAIN_FUNCTIONAL_ASSESSMENT: PREVENTS OR INTERFERES SOME ACTIVE ACTIVITIES AND ADLS
PAIN_FUNCTIONAL_ASSESSMENT: PREVENTS OR INTERFERES SOME ACTIVE ACTIVITIES AND ADLS
PAIN_FUNCTIONAL_ASSESSMENT: PREVENTS OR INTERFERES WITH ALL ACTIVE AND SOME PASSIVE ACTIVITIES

## 2020-03-07 ASSESSMENT — PAIN DESCRIPTION - ORIENTATION
ORIENTATION: LEFT

## 2020-03-07 ASSESSMENT — PAIN DESCRIPTION - LOCATION
LOCATION: SHOULDER

## 2020-03-07 ASSESSMENT — PAIN DESCRIPTION - PAIN TYPE
TYPE: ACUTE PAIN

## 2020-03-07 ASSESSMENT — PAIN DESCRIPTION - FREQUENCY
FREQUENCY: CONTINUOUS

## 2020-03-07 ASSESSMENT — PAIN DESCRIPTION - DIRECTION
RADIATING_TOWARDS: SHOULDER
RADIATING_TOWARDS: ARM
RADIATING_TOWARDS: ARM

## 2020-03-07 ASSESSMENT — PAIN DESCRIPTION - DESCRIPTORS
DESCRIPTORS: ACHING;SORE
DESCRIPTORS: ACHING
DESCRIPTORS: ACHING;SORE
DESCRIPTORS: ACHING;THROBBING

## 2020-03-07 ASSESSMENT — PAIN DESCRIPTION - ONSET
ONSET: ON-GOING
ONSET: ON-GOING
ONSET: AWAKENED FROM SLEEP
ONSET: ON-GOING

## 2020-03-07 NOTE — PROGRESS NOTES
Pt had drop in B/P post tramadol tonight 74/62, diaphoretic, and weakness. House Md notified at [address]. Verbal order for 500 NSS bolus, 25 mg Benadryl IV, and 40 mg Solu-medrol IV. Medications given with good results. B/P back up to 739 systolic. Pt stated that she felt this way when taking Tramadol in past, but did not mention it during admission process/allergy review to this nurse. Tramadol D/C'd per Dr. Penelope Tompkins, and Morphine ordered for left shoulder pain of 8/10 s/p fall with fx to left proximal humerus. Patient feeling better now, AAOx3, vitals stable.  Tramadol now on allergy list.

## 2020-03-07 NOTE — PROGRESS NOTES
Patient tolerating morphine well. No adverse effects. Administering zofran with pain medication to reduce side effects of nausea.

## 2020-03-08 LAB
ANION GAP SERPL CALCULATED.3IONS-SCNC: 14 MMOL/L (ref 7–16)
BUN BLDV-MCNC: 26 MG/DL (ref 8–23)
CALCIUM SERPL-MCNC: 8.6 MG/DL (ref 8.6–10.2)
CHLORIDE BLD-SCNC: 97 MMOL/L (ref 98–107)
CHLORIDE URINE RANDOM: <20 MMOL/L
CO2: 21 MMOL/L (ref 22–29)
CREAT SERPL-MCNC: 1.1 MG/DL (ref 0.5–1)
CREATININE URINE: 109 MG/DL (ref 29–226)
FERRITIN: 77 NG/ML
FOLATE: 6.3 NG/ML (ref 4.8–24.2)
GFR AFRICAN AMERICAN: 56
GFR NON-AFRICAN AMERICAN: 46 ML/MIN/1.73
GLUCOSE BLD-MCNC: 136 MG/DL (ref 74–99)
IRON SATURATION: 12 % (ref 15–50)
IRON: 29 MCG/DL (ref 37–145)
OSMOLALITY URINE: 471 MOSM/KG (ref 300–900)
OSMOLALITY: 288 MOSM/KG (ref 285–310)
POTASSIUM SERPL-SCNC: 4.3 MMOL/L (ref 3.5–5)
POTASSIUM, UR: 31.3 MMOL/L
SODIUM BLD-SCNC: 132 MMOL/L (ref 132–146)
SODIUM URINE: <20 MMOL/L
TOTAL IRON BINDING CAPACITY: 239 MCG/DL (ref 250–450)
VITAMIN B-12: 301 PG/ML (ref 211–946)

## 2020-03-08 PROCEDURE — 36415 COLL VENOUS BLD VENIPUNCTURE: CPT

## 2020-03-08 PROCEDURE — 83935 ASSAY OF URINE OSMOLALITY: CPT

## 2020-03-08 PROCEDURE — 1200000000 HC SEMI PRIVATE

## 2020-03-08 PROCEDURE — 83550 IRON BINDING TEST: CPT

## 2020-03-08 PROCEDURE — 6360000002 HC RX W HCPCS: Performed by: INTERNAL MEDICINE

## 2020-03-08 PROCEDURE — 99221 1ST HOSP IP/OBS SF/LOW 40: CPT | Performed by: ORTHOPAEDIC SURGERY

## 2020-03-08 PROCEDURE — 99232 SBSQ HOSP IP/OBS MODERATE 35: CPT | Performed by: INTERNAL MEDICINE

## 2020-03-08 PROCEDURE — 83540 ASSAY OF IRON: CPT

## 2020-03-08 PROCEDURE — 83930 ASSAY OF BLOOD OSMOLALITY: CPT

## 2020-03-08 PROCEDURE — 84133 ASSAY OF URINE POTASSIUM: CPT

## 2020-03-08 PROCEDURE — 2580000003 HC RX 258: Performed by: STUDENT IN AN ORGANIZED HEALTH CARE EDUCATION/TRAINING PROGRAM

## 2020-03-08 PROCEDURE — 23600 CLTX PROX HUMRL FX W/O MNPJ: CPT | Performed by: ORTHOPAEDIC SURGERY

## 2020-03-08 PROCEDURE — 84300 ASSAY OF URINE SODIUM: CPT

## 2020-03-08 PROCEDURE — 82436 ASSAY OF URINE CHLORIDE: CPT

## 2020-03-08 PROCEDURE — 2700000000 HC OXYGEN THERAPY PER DAY

## 2020-03-08 PROCEDURE — 82746 ASSAY OF FOLIC ACID SERUM: CPT

## 2020-03-08 PROCEDURE — 2580000003 HC RX 258: Performed by: INTERNAL MEDICINE

## 2020-03-08 PROCEDURE — 82728 ASSAY OF FERRITIN: CPT

## 2020-03-08 PROCEDURE — 80048 BASIC METABOLIC PNL TOTAL CA: CPT

## 2020-03-08 PROCEDURE — 94640 AIRWAY INHALATION TREATMENT: CPT

## 2020-03-08 PROCEDURE — 6370000000 HC RX 637 (ALT 250 FOR IP): Performed by: INTERNAL MEDICINE

## 2020-03-08 PROCEDURE — 82570 ASSAY OF URINE CREATININE: CPT

## 2020-03-08 PROCEDURE — 82607 VITAMIN B-12: CPT

## 2020-03-08 RX ORDER — FERROUS SULFATE 325(65) MG
325 TABLET ORAL
Status: DISCONTINUED | OUTPATIENT
Start: 2020-03-09 | End: 2020-03-10 | Stop reason: HOSPADM

## 2020-03-08 RX ORDER — SODIUM CHLORIDE 9 MG/ML
1000 INJECTION, SOLUTION INTRAVENOUS CONTINUOUS
Status: DISCONTINUED | OUTPATIENT
Start: 2020-03-08 | End: 2020-03-09

## 2020-03-08 RX ADMIN — MORPHINE SULFATE 2 MG: 2 INJECTION, SOLUTION INTRAMUSCULAR; INTRAVENOUS at 10:24

## 2020-03-08 RX ADMIN — SODIUM CHLORIDE 1000 ML: 9 INJECTION, SOLUTION INTRAVENOUS at 08:30

## 2020-03-08 RX ADMIN — LEVOTHYROXINE SODIUM 50 MCG: 50 TABLET ORAL at 06:11

## 2020-03-08 RX ADMIN — PANTOPRAZOLE SODIUM 40 MG: 40 TABLET, DELAYED RELEASE ORAL at 06:11

## 2020-03-08 RX ADMIN — CELECOXIB 200 MG: 100 CAPSULE ORAL at 21:58

## 2020-03-08 RX ADMIN — SODIUM CHLORIDE, PRESERVATIVE FREE 10 ML: 5 INJECTION INTRAVENOUS at 01:31

## 2020-03-08 RX ADMIN — ENOXAPARIN SODIUM 30 MG: 30 INJECTION SUBCUTANEOUS at 10:29

## 2020-03-08 RX ADMIN — BUDESONIDE 500 MCG: 0.5 SUSPENSION RESPIRATORY (INHALATION) at 20:19

## 2020-03-08 RX ADMIN — BUDESONIDE 500 MCG: 0.5 SUSPENSION RESPIRATORY (INHALATION) at 08:11

## 2020-03-08 RX ADMIN — CELECOXIB 200 MG: 100 CAPSULE ORAL at 10:25

## 2020-03-08 RX ADMIN — MORPHINE SULFATE 2 MG: 2 INJECTION, SOLUTION INTRAMUSCULAR; INTRAVENOUS at 20:30

## 2020-03-08 RX ADMIN — MORPHINE SULFATE 2 MG: 2 INJECTION, SOLUTION INTRAMUSCULAR; INTRAVENOUS at 01:30

## 2020-03-08 RX ADMIN — SODIUM CHLORIDE 1000 ML: 9 INJECTION, SOLUTION INTRAVENOUS at 22:03

## 2020-03-08 ASSESSMENT — PAIN SCALES - GENERAL
PAINLEVEL_OUTOF10: 8
PAINLEVEL_OUTOF10: 9
PAINLEVEL_OUTOF10: 8
PAINLEVEL_OUTOF10: 8
PAINLEVEL_OUTOF10: 2
PAINLEVEL_OUTOF10: 7

## 2020-03-08 ASSESSMENT — PAIN DESCRIPTION - ONSET
ONSET: ON-GOING

## 2020-03-08 ASSESSMENT — PAIN DESCRIPTION - PAIN TYPE
TYPE: ACUTE PAIN

## 2020-03-08 ASSESSMENT — PAIN DESCRIPTION - PROGRESSION
CLINICAL_PROGRESSION: GRADUALLY IMPROVING
CLINICAL_PROGRESSION: NOT CHANGED
CLINICAL_PROGRESSION: NOT CHANGED

## 2020-03-08 ASSESSMENT — PAIN - FUNCTIONAL ASSESSMENT
PAIN_FUNCTIONAL_ASSESSMENT: PREVENTS OR INTERFERES SOME ACTIVE ACTIVITIES AND ADLS

## 2020-03-08 ASSESSMENT — PAIN DESCRIPTION - LOCATION
LOCATION: ARM;SHOULDER
LOCATION: ARM;SHOULDER
LOCATION: SHOULDER
LOCATION: SHOULDER;ARM

## 2020-03-08 ASSESSMENT — PAIN DESCRIPTION - ORIENTATION
ORIENTATION: LEFT

## 2020-03-08 ASSESSMENT — PAIN DESCRIPTION - FREQUENCY
FREQUENCY: CONTINUOUS

## 2020-03-08 ASSESSMENT — PAIN DESCRIPTION - DESCRIPTORS
DESCRIPTORS: THROBBING;PRESSURE
DESCRIPTORS: THROBBING;ACHING
DESCRIPTORS: THROBBING;ACHING
DESCRIPTORS: ACHING

## 2020-03-08 NOTE — CONSULTS
disease     hypo    TIA (transient ischemic attack) 2018     Past Surgical History:   Procedure Laterality Date    ANKLE SURGERY      pin placement    APPENDECTOMY      BLEPHAROPLASTY      BREAST BIOPSY      BREAST SURGERY      biopsy bilat     CHOLECYSTECTOMY      COLONOSCOPY      ENDOSCOPY, COLON, DIAGNOSTIC  2012    ERCP  10/31/2012    ERCP  2012    stent removal    EYE SURGERY      cataract    FIXATION KYPHOPLASTY  2016    LUMBAR 2    FRACTURE SURGERY      left ankle, pin placement    HYSTERECTOMY      TX OFFICE/OUTPT VISIT,PROCEDURE ONLY N/A 2018    KYPHOPLASTY  T11  -- TIME UNDETERMINED performed by Emily Hughes DO at [facility] 22 THYROID SURGERY      biopsy    TONSILLECTOMY       Family History   Problem Relation Age of Onset    Cancer Mother         leukemia    Emphysema Father     Rheum Arthritis Sister     Cancer Brother     Cancer Brother     Heart Disease Brother      Social History     Tobacco Use    Smoking status: Former Smoker     Packs/day: 1.00     Types: Cigarettes     Start date: 1959     Last attempt to quit: 1996     Years since quittin.1    Smokeless tobacco: Never Used   Substance Use Topics    Alcohol use: Never     Frequency: Never    Drug use: Never     Allergies   Allergen Reactions    Tramadol Other (See Comments)     Diaphoresis, drop in B/P, weakness    Amlodipine Besylate     Bentyl [Dicyclomine Hcl]     Codeine     Lactose Intolerance (Gi)     Mobic [Meloxicam]     Monosodium Glutamate     Other      Artificial Sweetners    Pantoprazole     Prednisone     Talwin [Pentazocine]          OBJECTIVE:      Physical Examination:   General appearance: alert, well appearing, and in no distress,  normal appearing weight.   Ecchymosis over the chin  Mental status: alert, oriented to person, place, and time, normal mood, behavior, speech, dress, motor activity, and thought processes  Abdomen: soft, daughter in detail. I explained that we would treat the proximal humerus fracture nonoperatively in a sling. She does not have to wear the swath that this is only for comfort. She should also come out of the sling twice a day for range of motion of the elbow wrist and fingers. I will see her back in the office in about 10 to 14 days. At that point time we will initiate occupational therapy for pendulum exercises etc.  Until that time she can continue to ice the shoulder. She may be weightbearing as tolerated bilateral lower extremities and nonweightbearing with the left upper extremity. If any pain should increase in her right knee she is to call me and come in for evaluation immediately she and the daughter understand this.     PLAN:    Nonweightbearing left upper extremity, sling for comfort,, the sling twice a day for range of motion wrist fingers and elbow    Weight-bear as tolerated bilateral lower extremities    Due to age and multiple injured extremities will most likely require a rehabilitation stay    Pain control    Follow-up in the office in 10 to 14 days, office number is [phone]    Please call with any questions or concerns, appreciate the opportunity to help with the care of this patient        ELECTRONICALLY signed by:    Anastasia Lemon MD  3/8/20

## 2020-03-08 NOTE — PROGRESS NOTES
[facility] Progress Note    Admitting Date and Time: 3/6/2020 12:21 PM  Admit Dx: Closed fracture of proximal end of left humerus, initial encounter [S42.202A]  Closed fracture of proximal end of left humerus, initial encounter [S42.202A]    Subjective:  Patient is being followed for Closed fracture of proximal end of left humerus, initial encounter [S42.202A]  Closed fracture of proximal end of left humerus, initial encounter [S42.202A]   Patient seen and examined today. Is frustrated that she did not see a surgeon yesterday to determine whether she would go to surgery or not. Denies any other issues. Pain is controlled with morphine. Developed hyponatremia this morning has no complaints or concerns. Per RN: No acute events overnight. ROS: denies fever, chills, cp, sob, n/v, HA unless stated above.       celecoxib  200 mg Oral BID    pantoprazole  40 mg Oral QAM AC    levothyroxine  50 mcg Oral Daily    sodium chloride flush  10 mL Intravenous 2 times per day    enoxaparin  30 mg Subcutaneous Daily    budesonide  0.5 mg Nebulization BID     morphine, 2 mg, Q3H PRN  acetaminophen, 650 mg, Q6H PRN  sodium chloride flush, 10 mL, PRN  acetaminophen, 650 mg, Q6H PRN    Or  acetaminophen, 650 mg, Q6H PRN  polyethylene glycol, 17 g, Daily PRN  promethazine, 12.5 mg, Q6H PRN    Or  ondansetron, 4 mg, Q6H PRN       Objective:  /60   Pulse 81   Temp 97.8 °F (36.6 °C) (Oral)   Resp 16   Ht 5' 1\" (1.549 m)   Wt 123 lb 1.6 oz (55.8 kg)   SpO2 93%   BMI 23.26 kg/m²   General Appearance: alert and oriented to person, place and time and in no acute distress  Skin: warm and dry, bruising on left jaw and left upper eyelid  Neck: neck supple and non tender without mass   Pulmonary/Chest: clear to auscultation bilaterally- no wheezes, rales or rhonchi, normal air movement, no respiratory distress  Cardiovascular: normal rate, normal S1 and S2 and no carotid bruits  Extremities: no cyanosis, no clubbing and no edema, palpable tenderness throughout left upper extremity, decreased range of motion due to pain    Recent Labs     03/06/20  1618 03/07/20  1827    130*   K 4.4 4.8   CL 98 97*   CO2 21* 21*   BUN 17 24*   CREATININE 0.9 1.3*   GLUCOSE 140* 133*   CALCIUM 9.1 9.1       Recent Labs     03/06/20  1618 03/07/20  1827   WBC 9.4 11.3   RBC 3.78 3.29*   HGB 11.6 10.1*   HCT 36.2 31.7*   MCV 95.8 96.4   MCH 30.7 30.7   MCHC 32.0 31.9*   RDW 13.0 13.3    197   MPV 9.4 9.4     Radiology:   XR SHOULDER LEFT (MIN 2 VIEWS)   Final Result   Greater tuberosity fracture of the left humerus and possible impacted   anatomic neck of the humerus fracture on the left side. These, as well   as the left chest wall, could be better characterized by   cross-sectional noncontrast CT imaging. XR WRIST LEFT (MIN 3 VIEWS)   Final Result   Negative for acute traumatic findings. XR CHEST PORTABLE   Final Result   No acute traumatic complications. XR PELVIS (1-2 VIEWS)   Final Result   Severe osteopenia, consistent with advanced age, but no evidence of   acute fracture or bony misalignment. Lumbar levo levorotatory scoliotic curvature is noted. .      XR KNEE RIGHT (3 VIEWS)   Final Result      No acute fracture is identified. Soft tissue swelling and knee joint effusion. CT Head WO Contrast   Final Result   Negative noncontrast CT study. Specifically, there is no evidence of intracranial hemorrhage or mass   effect, and no calvarial traumatic findings are noted. CT Facial Bones WO Contrast   Final Result   There is no evidence of a facial fracture, and the   reported left eyebrow laceration and shin contusion are not well seen   by CT. Nasal bones are intact, orbits are intact, and there is no evidence of   paranasal sinus pathology.       Degenerative changes of the temporomandibular joints and a degree of   dental atrophy and prosthetic intervention are noted.      CT Cervical Spine WO Contrast   Final Result   Age-related osteopenia and degenerative spinal findings without   evidence of acute traumatic findings. Assessment:    Principal Problem:    Closed fracture of left proximal humerus  Active Problems:    COPD (chronic obstructive pulmonary disease) (HCC)    Acquired hypothyroidism    GERD (gastroesophageal reflux disease)  Resolved Problems:    * No resolved hospital problems. *    Plan:  1. Closed fracture of left proximal humerus: Orthopedics consulted, input appreciated. Pain management. 2.  Hypothyroid: Continue home meds  3. GERD: Continue Protonix  4. COPD/Asthma: Continue Pulmicort daily, albuterol as needed, appears stable at this time  5. Hyponatremia and MONA: Urine studies to be sent, will start gentle hydration with normal saline and repeat BMP today.     Disposition planning: Consult PT OT likely will need placement will whether she has surgery or not due to decreased ambulation with the left humerus fracture    Pilar Quintana MD

## 2020-03-09 ENCOUNTER — TELEPHONE (OUTPATIENT)
Dept: ORTHOPEDIC SURGERY | Age: 85
End: 2020-03-09

## 2020-03-09 LAB
ANION GAP SERPL CALCULATED.3IONS-SCNC: 10 MMOL/L (ref 7–16)
BASOPHILS ABSOLUTE: 0.04 E9/L (ref 0–0.2)
BASOPHILS RELATIVE PERCENT: 0.7 % (ref 0–2)
BUN BLDV-MCNC: 19 MG/DL (ref 8–23)
CALCIUM SERPL-MCNC: 8.4 MG/DL (ref 8.6–10.2)
CHLORIDE BLD-SCNC: 102 MMOL/L (ref 98–107)
CO2: 22 MMOL/L (ref 22–29)
CREAT SERPL-MCNC: 0.8 MG/DL (ref 0.5–1)
EOSINOPHILS ABSOLUTE: 0.18 E9/L (ref 0.05–0.5)
EOSINOPHILS RELATIVE PERCENT: 3 % (ref 0–6)
GFR AFRICAN AMERICAN: >60
GFR NON-AFRICAN AMERICAN: >60 ML/MIN/1.73
GLUCOSE BLD-MCNC: 137 MG/DL (ref 74–99)
HCT VFR BLD CALC: 28.3 % (ref 34–48)
HEMOGLOBIN: 9.1 G/DL (ref 11.5–15.5)
IMMATURE GRANULOCYTES #: 0.02 E9/L
IMMATURE GRANULOCYTES %: 0.3 % (ref 0–5)
LYMPHOCYTES ABSOLUTE: 1.16 E9/L (ref 1.5–4)
LYMPHOCYTES RELATIVE PERCENT: 19 % (ref 20–42)
MCH RBC QN AUTO: 31.5 PG (ref 26–35)
MCHC RBC AUTO-ENTMCNC: 32.2 % (ref 32–34.5)
MCV RBC AUTO: 97.9 FL (ref 80–99.9)
MONOCYTES ABSOLUTE: 0.31 E9/L (ref 0.1–0.95)
MONOCYTES RELATIVE PERCENT: 5.1 % (ref 2–12)
NEUTROPHILS ABSOLUTE: 4.38 E9/L (ref 1.8–7.3)
NEUTROPHILS RELATIVE PERCENT: 71.9 % (ref 43–80)
PDW BLD-RTO: 13.3 FL (ref 11.5–15)
PLATELET # BLD: 152 E9/L (ref 130–450)
PMV BLD AUTO: 9.4 FL (ref 7–12)
POTASSIUM REFLEX MAGNESIUM: 4.4 MMOL/L (ref 3.5–5)
RBC # BLD: 2.89 E12/L (ref 3.5–5.5)
SODIUM BLD-SCNC: 134 MMOL/L (ref 132–146)
WBC # BLD: 6.1 E9/L (ref 4.5–11.5)

## 2020-03-09 PROCEDURE — 97165 OT EVAL LOW COMPLEX 30 MIN: CPT

## 2020-03-09 PROCEDURE — 1200000000 HC SEMI PRIVATE

## 2020-03-09 PROCEDURE — 97535 SELF CARE MNGMENT TRAINING: CPT

## 2020-03-09 PROCEDURE — 36415 COLL VENOUS BLD VENIPUNCTURE: CPT

## 2020-03-09 PROCEDURE — 6370000000 HC RX 637 (ALT 250 FOR IP): Performed by: INTERNAL MEDICINE

## 2020-03-09 PROCEDURE — 94640 AIRWAY INHALATION TREATMENT: CPT

## 2020-03-09 PROCEDURE — 85025 COMPLETE CBC W/AUTO DIFF WBC: CPT

## 2020-03-09 PROCEDURE — 6370000000 HC RX 637 (ALT 250 FOR IP): Performed by: STUDENT IN AN ORGANIZED HEALTH CARE EDUCATION/TRAINING PROGRAM

## 2020-03-09 PROCEDURE — 6360000002 HC RX W HCPCS: Performed by: INTERNAL MEDICINE

## 2020-03-09 PROCEDURE — 99233 SBSQ HOSP IP/OBS HIGH 50: CPT | Performed by: INTERNAL MEDICINE

## 2020-03-09 PROCEDURE — 2580000003 HC RX 258: Performed by: INTERNAL MEDICINE

## 2020-03-09 PROCEDURE — 97161 PT EVAL LOW COMPLEX 20 MIN: CPT

## 2020-03-09 PROCEDURE — 80048 BASIC METABOLIC PNL TOTAL CA: CPT

## 2020-03-09 RX ADMIN — MORPHINE SULFATE 2 MG: 2 INJECTION, SOLUTION INTRAMUSCULAR; INTRAVENOUS at 08:19

## 2020-03-09 RX ADMIN — LEVOTHYROXINE SODIUM 50 MCG: 50 TABLET ORAL at 06:07

## 2020-03-09 RX ADMIN — MORPHINE SULFATE 2 MG: 2 INJECTION, SOLUTION INTRAMUSCULAR; INTRAVENOUS at 16:13

## 2020-03-09 RX ADMIN — PANTOPRAZOLE SODIUM 40 MG: 40 TABLET, DELAYED RELEASE ORAL at 06:07

## 2020-03-09 RX ADMIN — Medication 10 ML: at 21:01

## 2020-03-09 RX ADMIN — MORPHINE SULFATE 2 MG: 2 INJECTION, SOLUTION INTRAMUSCULAR; INTRAVENOUS at 21:10

## 2020-03-09 RX ADMIN — CELECOXIB 200 MG: 100 CAPSULE ORAL at 08:16

## 2020-03-09 RX ADMIN — CELECOXIB 200 MG: 100 CAPSULE ORAL at 21:01

## 2020-03-09 RX ADMIN — FERROUS SULFATE TAB 325 MG (65 MG ELEMENTAL FE) 325 MG: 325 (65 FE) TAB at 08:16

## 2020-03-09 RX ADMIN — BUDESONIDE 500 MCG: 0.5 SUSPENSION RESPIRATORY (INHALATION) at 20:40

## 2020-03-09 RX ADMIN — MORPHINE SULFATE 2 MG: 2 INJECTION, SOLUTION INTRAMUSCULAR; INTRAVENOUS at 04:26

## 2020-03-09 RX ADMIN — BUDESONIDE 500 MCG: 0.5 SUSPENSION RESPIRATORY (INHALATION) at 08:45

## 2020-03-09 RX ADMIN — ENOXAPARIN SODIUM 30 MG: 30 INJECTION SUBCUTANEOUS at 08:16

## 2020-03-09 ASSESSMENT — PAIN SCALES - GENERAL
PAINLEVEL_OUTOF10: 7
PAINLEVEL_OUTOF10: 0
PAINLEVEL_OUTOF10: 7
PAINLEVEL_OUTOF10: 0
PAINLEVEL_OUTOF10: 6
PAINLEVEL_OUTOF10: 7
PAINLEVEL_OUTOF10: 0
PAINLEVEL_OUTOF10: 7
PAINLEVEL_OUTOF10: 3

## 2020-03-09 ASSESSMENT — PAIN DESCRIPTION - PAIN TYPE
TYPE: ACUTE PAIN
TYPE: ACUTE PAIN

## 2020-03-09 ASSESSMENT — PAIN DESCRIPTION - DESCRIPTORS
DESCRIPTORS: ACHING;DISCOMFORT;SORE
DESCRIPTORS: THROBBING;ACHING

## 2020-03-09 ASSESSMENT — PAIN DESCRIPTION - FREQUENCY
FREQUENCY: CONTINUOUS
FREQUENCY: CONTINUOUS

## 2020-03-09 ASSESSMENT — PAIN DESCRIPTION - PROGRESSION
CLINICAL_PROGRESSION: GRADUALLY IMPROVING
CLINICAL_PROGRESSION: GRADUALLY IMPROVING

## 2020-03-09 ASSESSMENT — PAIN DESCRIPTION - LOCATION
LOCATION: ARM
LOCATION: SHOULDER;ARM

## 2020-03-09 ASSESSMENT — PAIN DESCRIPTION - ONSET
ONSET: ON-GOING
ONSET: ON-GOING

## 2020-03-09 ASSESSMENT — PAIN DESCRIPTION - ORIENTATION
ORIENTATION: LEFT
ORIENTATION: LEFT

## 2020-03-09 ASSESSMENT — PAIN - FUNCTIONAL ASSESSMENT: PAIN_FUNCTIONAL_ASSESSMENT: PREVENTS OR INTERFERES SOME ACTIVE ACTIVITIES AND ADLS

## 2020-03-09 NOTE — PROGRESS NOTES
disease) ([facility] [address]    Acquired hypothyroidism    GERD (gastroesophageal reflux disease)  Resolved Problems:    * No resolved hospital problems. *      Plan:    Closed left proximal humerus fracture: No surgical intervention per orthopedic surgery. Continue left upper extremity in a sling. PT/OT. The patient is quite functional at home. I believe she will get great benefit from acute rehab. Continue adequate pain control. Hypothyroidism: Continue home Synthroid. GERD: Continue Protonix. COPD/Asthma: Continue daily Pulmicort. MONA: Resolved, Cr currently 0.8 from 1.1. Was 1.3 on 3/7/20. Will stop IVF as the patient is having adequate PO intake.     DVT Prophylaxis: Lovenox  Diet: Dysphagia Pureed    NOTE: This report was transcribed using voice recognition software. Every effort was made to ensure accuracy; however, inadvertent computerized transcription errors may be present.   Electronically signed by Joanna Rodríguez MD on 3/9/2020 at 1:31 PM

## 2020-03-09 NOTE — DISCHARGE INSTR - COC
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    Meningioma ([facility]) D32.9    Acquired hypothyroidism E03.9    Back pain M54.9    Abdominal pain R10.9    Abdominal pain, LUQ R10.12    Acute LUQ pain R10.12    Chest pain R07.9    GERD (gastroesophageal reflux disease) K21.9    Closed fracture of left proximal humerus S42.202A       Isolation/Infection:   Isolation          No Isolation        Patient Infection Status     None to display          Nurse Assessment:  Last Vital Signs: BP (!) 125/58   Pulse 54   Temp 98.9 °F (37.2 °C) (Oral)   Resp 16   Ht 5' 1\" (1.549 m)   Wt 123 lb 7 oz (56 kg)   SpO2 91%   BMI 23.32 kg/m²     Last documented pain score (0-10 scale): Pain Level: 7  Last Weight:   Wt Readings from Last 1 Encounters:   03/09/20 123 lb 7 oz (56 kg)     Mental Status:  oriented    IV Access:  - None    Nursing Mobility/ADLs:  Walking   Assisted  Transfer  Assisted  Bathing  Assisted  Dressing  Assisted  Toileting  Assisted  Feeding  Independent  Med Admin  Assisted  Med Delivery   whole    Wound Care Documentation and Therapy:  Incision 08/01/18 Back (Active)   Number of days: 585        Elimination:  Continence:   · Bowel: Yes  · Bladder: Yes  Urinary Catheter: None   Colostomy/Ileostomy/Ileal Conduit: No       Date of Last BM: 03/09/2020    Intake/Output Summary (Last 24 hours) at 3/9/2020 1341  Last data filed at 3/9/2020 1305  Gross per 24 hour   Intake 878 ml   Output 2500 ml   Net -1622 ml     I/O last 3 completed shifts:   In: 638 [I.V.:638]  Out: 1200 [Urine:1200]    Safety Concerns:     History of Falls (last 30 days) and At Risk for 3/10/20 at 9:05 AM EDT

## 2020-03-09 NOTE — PROGRESS NOTES
grossly    Functional Assessment:   Initial Eval Status  Date: 3/9/2020 Treatment Status  Date:  Short Term Goals  Treatment Frequency/Duration: 2-5x/week for 3-7 days PRN   Feeding SBA  Setup   Grooming Mod A  SBA  (standing at sink)   UB Dressing Mod A to [facility]/don hospital gown. Max A needed to doff/don L UE sling and swathe. SBA while demonstrating Good understanding of compensatory strategies. LB Dressing Max A  SBA - with use of AE, as needed/appropriate   Bathing Max A  SBA - with use of AE/DME, as needed/appropriate   Toileting Min A for toilet transfer; Mod A for perineal hygiene while standing. Cues given to maximize safety with toilet transfer. Supervision   Bed Mobility  Supine-to-Sit: Min A  Independent   Functional Transfers Sit-to-Stand: Min A   from EOB and standard-height toilet (with use of R grab bar). Supervision   Functional Mobility Min A   (with handheld assistance through R hand) within patient's room and bathroom. Unsteadiness demonstrated occasionally. Supervision - with use of device, as needed/appropriate   Balance Sitting: Good  (at EOB)  Standing: Fair-  (with handheld assistance through R hand)  Fair+ dynamic standing balance during completion of ADLs and other functional tasks. Activity Tolerance Fair  Patient will demonstrate Good understanding and consistent implementation of energy conservation techniques and work simplification techniques into ADL/IADL routines. Visual/  Perceptual WFL  Patient typically wears glasses. N/A     Long-Term Goal: Patient will increase functional independence to PLOF in order to allow patient to live in least restrictive environment. Strength: ROM: Additional Information:    R UE  4-/5 grossly AROM WFL    L UE Not assessed. L shoulder not assessed. AAROM of L elbow, wrist, and digits grossly WFL. Hand Dominance: Right  [facility]  Sensation: Patient denied experiencing numbness/tingling in B UEs.   Tone: WFL  Edema: upon discharge. Patient and/or family were instructed on functional diagnosis, prognosis/goals, and OT plan of care. Demonstrated Good understanding. Low complexity evaluation + 25 timed treatment minutes  Treatment Time In: 1133  Treatment Time Out: 1158    Treatment Charges: Minutes: Units: Ther Ex  [id number]     Manual Therapy 90136     Thera Activities 18317     ADL/Home Mgt 58577 25 2   Neuro Re-ed 18047     Group Therapy      Orthotic manage/training  28229     Total Timed Treatment 25 2     Evaluation time includes thorough review of current medical information, gathering information on past medical history/social history and prior level of function, completion of standardized testing/informal observation of tasks, assessment of data, and development of POC/Goals. Christina Schmitz OTR/L  License Number: [id number]

## 2020-03-09 NOTE — CARE COORDINATION
Social Work / Discharge Planning : SW met with patient and daughter Chris Dixon [phone] and explained role as discharge planner/ transition of care. SW spoke to Therapy and patient appropriate for SNF. SNF discussed and both in agreement. CHoice list provided and they would like referral made to 1.) [facility] and 2.) [facility]. Referral left on VM  With Gita . Await response. N 17 generated. SW to follow. Electronically signed by CIRO Godinez on 3/9/20 at 1:40 PM EDT    Addendum : Kaiser Jarrell from [facility] accepted patient. Daughter and patient updated. N 17, HE#NS and transport forms completed. No -precert needed. SW to follow.  Electronically signed by CIRO Godinez on 3/9/20 at 3:35 PM EDT

## 2020-03-10 VITALS
HEIGHT: 61 IN | SYSTOLIC BLOOD PRESSURE: 136 MMHG | RESPIRATION RATE: 18 BRPM | OXYGEN SATURATION: 94 % | BODY MASS INDEX: 23.3 KG/M2 | DIASTOLIC BLOOD PRESSURE: 63 MMHG | WEIGHT: 123.44 LBS | HEART RATE: 77 BPM | TEMPERATURE: 98.2 F

## 2020-03-10 LAB
ANION GAP SERPL CALCULATED.3IONS-SCNC: 9 MMOL/L (ref 7–16)
BASOPHILS ABSOLUTE: 0.04 E9/L (ref 0–0.2)
BASOPHILS RELATIVE PERCENT: 0.9 % (ref 0–2)
BUN BLDV-MCNC: 15 MG/DL (ref 8–23)
CALCIUM SERPL-MCNC: 8.5 MG/DL (ref 8.6–10.2)
CHLORIDE BLD-SCNC: 101 MMOL/L (ref 98–107)
CO2: 22 MMOL/L (ref 22–29)
CREAT SERPL-MCNC: 0.8 MG/DL (ref 0.5–1)
EOSINOPHILS ABSOLUTE: 0.22 E9/L (ref 0.05–0.5)
EOSINOPHILS RELATIVE PERCENT: 5 % (ref 0–6)
GFR AFRICAN AMERICAN: >60
GFR NON-AFRICAN AMERICAN: >60 ML/MIN/1.73
GLUCOSE BLD-MCNC: 105 MG/DL (ref 74–99)
HCT VFR BLD CALC: 25.6 % (ref 34–48)
HEMOGLOBIN: 8.2 G/DL (ref 11.5–15.5)
IMMATURE GRANULOCYTES #: 0.02 E9/L
IMMATURE GRANULOCYTES %: 0.5 % (ref 0–5)
LYMPHOCYTES ABSOLUTE: 1.12 E9/L (ref 1.5–4)
LYMPHOCYTES RELATIVE PERCENT: 25.3 % (ref 20–42)
MCH RBC QN AUTO: 31.5 PG (ref 26–35)
MCHC RBC AUTO-ENTMCNC: 32 % (ref 32–34.5)
MCV RBC AUTO: 98.5 FL (ref 80–99.9)
MONOCYTES ABSOLUTE: 0.26 E9/L (ref 0.1–0.95)
MONOCYTES RELATIVE PERCENT: 5.9 % (ref 2–12)
NEUTROPHILS ABSOLUTE: 2.77 E9/L (ref 1.8–7.3)
NEUTROPHILS RELATIVE PERCENT: 62.4 % (ref 43–80)
PDW BLD-RTO: 13.2 FL (ref 11.5–15)
PLATELET # BLD: 155 E9/L (ref 130–450)
PMV BLD AUTO: 9.7 FL (ref 7–12)
POTASSIUM REFLEX MAGNESIUM: 4.4 MMOL/L (ref 3.5–5)
RBC # BLD: 2.6 E12/L (ref 3.5–5.5)
SODIUM BLD-SCNC: 132 MMOL/L (ref 132–146)
WBC # BLD: 4.4 E9/L (ref 4.5–11.5)

## 2020-03-10 PROCEDURE — 94640 AIRWAY INHALATION TREATMENT: CPT

## 2020-03-10 PROCEDURE — 97110 THERAPEUTIC EXERCISES: CPT

## 2020-03-10 PROCEDURE — 2580000003 HC RX 258: Performed by: INTERNAL MEDICINE

## 2020-03-10 PROCEDURE — 99239 HOSP IP/OBS DSCHRG MGMT >30: CPT | Performed by: INTERNAL MEDICINE

## 2020-03-10 PROCEDURE — 6360000002 HC RX W HCPCS: Performed by: INTERNAL MEDICINE

## 2020-03-10 PROCEDURE — 36415 COLL VENOUS BLD VENIPUNCTURE: CPT

## 2020-03-10 PROCEDURE — 6370000000 HC RX 637 (ALT 250 FOR IP): Performed by: INTERNAL MEDICINE

## 2020-03-10 PROCEDURE — 97530 THERAPEUTIC ACTIVITIES: CPT

## 2020-03-10 PROCEDURE — 85025 COMPLETE CBC W/AUTO DIFF WBC: CPT

## 2020-03-10 PROCEDURE — 80048 BASIC METABOLIC PNL TOTAL CA: CPT

## 2020-03-10 PROCEDURE — 6370000000 HC RX 637 (ALT 250 FOR IP): Performed by: STUDENT IN AN ORGANIZED HEALTH CARE EDUCATION/TRAINING PROGRAM

## 2020-03-10 RX ORDER — FERROUS SULFATE 325(65) MG
325 TABLET ORAL
Qty: 30 TABLET | Refills: 1 | Status: SHIPPED | OUTPATIENT
Start: 2020-03-11 | End: 2022-02-17

## 2020-03-10 RX ADMIN — FERROUS SULFATE TAB 325 MG (65 MG ELEMENTAL FE) 325 MG: 325 (65 FE) TAB at 09:01

## 2020-03-10 RX ADMIN — MORPHINE SULFATE 2 MG: 2 INJECTION, SOLUTION INTRAMUSCULAR; INTRAVENOUS at 02:43

## 2020-03-10 RX ADMIN — LEVOTHYROXINE SODIUM 50 MCG: 50 TABLET ORAL at 06:04

## 2020-03-10 RX ADMIN — PANTOPRAZOLE SODIUM 40 MG: 40 TABLET, DELAYED RELEASE ORAL at 06:04

## 2020-03-10 RX ADMIN — Medication 10 ML: at 09:06

## 2020-03-10 RX ADMIN — MORPHINE SULFATE 2 MG: 2 INJECTION, SOLUTION INTRAMUSCULAR; INTRAVENOUS at 09:05

## 2020-03-10 RX ADMIN — BUDESONIDE 500 MCG: 0.5 SUSPENSION RESPIRATORY (INHALATION) at 08:41

## 2020-03-10 RX ADMIN — ENOXAPARIN SODIUM 30 MG: 30 INJECTION SUBCUTANEOUS at 09:01

## 2020-03-10 RX ADMIN — CELECOXIB 200 MG: 100 CAPSULE ORAL at 09:01

## 2020-03-10 ASSESSMENT — PAIN DESCRIPTION - LOCATION: LOCATION: ANKLE

## 2020-03-10 ASSESSMENT — PAIN DESCRIPTION - ORIENTATION: ORIENTATION: LEFT

## 2020-03-10 ASSESSMENT — PAIN DESCRIPTION - PROGRESSION: CLINICAL_PROGRESSION: GRADUALLY IMPROVING

## 2020-03-10 ASSESSMENT — PAIN DESCRIPTION - DESCRIPTORS: DESCRIPTORS: ACHING

## 2020-03-10 ASSESSMENT — PAIN SCALES - GENERAL
PAINLEVEL_OUTOF10: 0
PAINLEVEL_OUTOF10: 7
PAINLEVEL_OUTOF10: 7
PAINLEVEL_OUTOF10: 3
PAINLEVEL_OUTOF10: 7

## 2020-03-10 ASSESSMENT — PAIN DESCRIPTION - PAIN TYPE: TYPE: ACUTE PAIN

## 2020-03-10 ASSESSMENT — PAIN DESCRIPTION - FREQUENCY: FREQUENCY: CONTINUOUS

## 2020-03-10 ASSESSMENT — PAIN - FUNCTIONAL ASSESSMENT: PAIN_FUNCTIONAL_ASSESSMENT: PREVENTS OR INTERFERES SOME ACTIVE ACTIVITIES AND ADLS

## 2020-03-10 ASSESSMENT — PAIN DESCRIPTION - ONSET: ONSET: ON-GOING

## 2020-03-10 NOTE — CARE COORDINATION
Social Work / Discharge Planning :Patient will be discharged today at [facility] at 2:00 pm via [place]. Patient, daughter Mahamed Dies, charge RN,RN and Coty Cormier from [facility] updated on discharge and time of transport. SW to follow.  Electronically signed by CIRO Forman on 3/10/20 at 10:02 AM EDT

## 2020-03-10 NOTE — PROGRESS NOTES
[facility] Quality Flow/Interdisciplinary Rounds Progress Note        Quality Flow Rounds held on March 10, 2020    Disciplines Attending:  Bedside Nurse, ,  and Nursing Unit Leadership    Ellie Potts was admitted on 3/6/2020 12:21 PM    Anticipated Discharge Date:  Expected Discharge Date: 03/09/20    Disposition:    Freeman Score:  Freeman Scale Score: 18    Readmission Risk              Risk of Unplanned Readmission:        12           Discussed patient goal for the day, patient clinical progression, and barriers to discharge.   The following Goal(s) of the Day/Commitment(s) have been identified:  d/c nadia Leiva  March 10, 2020

## 2020-03-10 NOTE — PROGRESS NOTES
Occupational Therapy  OT BEDSIDE TREATMENT NOTE      Date:3/10/2020  Patient Name: Linn Rosas  MRN: [id number]  : 1927  Room: [address] [place] [address]     Referring Provider: Iveth Mccullough MD  Evaluating OT: Nicole Soares, OTR/L - [id number]     AM-PAC Daily Activity Raw Score: 14      Recommended Adaptive Equipment: Continue to assess.      Diagnosis: Closed fracture of proximal end of left humerus, initial encounter [S4]              Patient presented to hospital s/p fall while shopping. Non-operative management of L proximal humerus fracture, per ortho note. Patient also found to have R knee contusion and a possible left superior pubic rami fracture (per ortho note).    Pertinent Medical History: COPD, h/o meningioma, osteoporosis, neuropathy, chronic back pain,TIA, arthritis, OA     Precautions: falls, NWB L UE, L UE sling (swathe is for comfort - per ortho), should \"come out of the sling twice a day for [ROM] of the elbow, wrist, and fingers\" (per ortho note), WBAT B LEs     Home Living: Patient lives alone in a one-floor condo; patient lives across the nash from her daughter. Bathroom Setup: tub shower (with tub seat)  Equipment Owned: cane     Prior Level of Function (PLOF): Per patient and patient's daughter, patient was independent with ADLs, needed assistance with community-based IADLs, and independent with functional mobility (with cane usually) prior to this hospitalization. Patient had been independent with household-based IADLs prior. Driving: No - patient's daughter assists with transportation     Pain Level: Pt reports L UE pain upon therapist arrival.   Cognition: Patient alert and oriented.      Functional Assessment:    Initial Eval Status  Date: 3/9/2020 Treatment Status   Short Term Goals  Treatment Frequency/Duration: 2-5x/week for 3-7 days PRN   Feeding SBA Setup to drink from cup  Setup   Grooming Mod A   SBA  (standing at sink)   UB Dressing Mod A to doff/don hospital gown.  Max A needed to doff/don L UE sling and swathe.   SBA while demonstrating Good understanding of compensatory strategies. LB Dressing Max A   SBA - with use of AE, as needed/appropriate   Bathing Max A   SBA - with use of AE/DME, as needed/appropriate   Toileting Min A for toilet transfer; Mod A for perineal hygiene while standing. Cues given to maximize safety with toilet transfer.   Supervision   Bed Mobility  Supine-to-Sit: Min A   Independent   Functional Transfers Sit-to-Stand: Min A   from EOB and standard-height toilet (with use of R grab bar).   Supervision   Functional Mobility Min A   (with handheld assistance through R hand) within patient's room and bathroom. Unsteadiness demonstrated occasionally.   Supervision - with use of device, as needed/appropriate   Balance Sitting: Good  (at EOB)  Standing: Fair-  (with handheld assistance through R hand)   Fair+ dynamic standing balance during completion of ADLs and other functional tasks. Activity Tolerance Fair Fair -  Patient will demonstrate Good understanding and consistent implementation of energy conservation techniques and work simplification techniques into ADL/IADL routines. Comments:  Pt laying in the bed. Reporting pain in L UE. Pt going to skilled nursing facility later today. Agreeable to L UE distal ROM in the bed. Sling positioned loose on pt's arm without support behind arm for neutral positioning to body. Distal ROM completed after sling and swathe removed. Instructed with ROM for wrist and hand that can be completed while in the sling throughout the day. Sling repositioned and swathe placed snugly around pt. Folded blanket placed behind UE to keep arm from extending back while supine in the bed. Pt reported decreased pain and increased comfort in the sling. Instructed pt to have daughter take a picture of sling position to help guide others in adjusting sling in the future. Daughter not present however pt verbalized understanding. Exercise: ROM to elbow, wrist, and hand. Pitting edema at elbow. Education/treatment:  Therapeutic exercise completed with ROM and instruction of exercise. Pt education of sling positioning, UE positioning, and exercise program.  Facilitation of movement to increase ADL with one handed skills. · Pt has made  progress towards set goals.    · Continue with current plan of care        Treatment Charges: Mins Units    ADL/Home Mgt [id number]      Thera Activities 11357 8 1    Ther Ex [id number] [id number] 1    Manual Therapy 63820      Neuro Re-ed 80072      Orthotic manage/training  [id number]      Non Billable Time      Total Timed Treatment 27 [address] [facility] AMEYA/L [id number]

## 2020-03-10 NOTE — DISCHARGE SUMMARY
[facility] Physician Discharge Summary       Ginny Ahumada MD  [address] [address] [address] [facility] at [address] [facility] [address] [address], [phone]    Call in 1 day        Activity level: NWB to LUE    Dispo: To rehab    Condition on discharge: Stable    Patient ID:  David Villa  [id number]  80 y.o.  12/22/1927    Admit date: 3/6/2020    Discharge date and time:  3/10/2020  12:29 PM    Admission Diagnoses: Principal Problem:    Closed fracture of left proximal humerus  Active Problems:    COPD (chronic obstructive pulmonary disease) (HCC)    Acquired hypothyroidism    GERD (gastroesophageal reflux disease)  Resolved Problems:    * No resolved hospital problems. *      Discharge Diagnoses: Principal Problem:    Closed fracture of left proximal humerus  Active Problems:    COPD (chronic obstructive pulmonary disease) (HCC)    Acquired hypothyroidism    GERD (gastroesophageal reflux disease)  Resolved Problems:    * No resolved hospital problems. *      Consults:  IP CONSULT TO ORTHOPEDIC SURGERY  IP CONSULT TO [address] [place] [address]  Course:   Patient David Villa is a 80 y.o. presented with Closed fracture of proximal end of left humerus, initial encounter [S42.202A]  Closed fracture of proximal end of left humerus, initial encounter [S42.202A]    The patient presented after after she tripped and fell in the mall and landed on her left foot and left shoulder. X-rays revealed a left humeral fracture. Orthopedic surgery was consulted and recommended placement in a sling and nonweightbearing. They also did not recommend any surgical intervention. The patient had good pain control. She was evaluated by PT/OT and rehab was recommended. The patient was hemodynamically stable and approved for discharge by all services.     Discharge the posterior fossa is not identified on these images. 29 There is no evidence of skeletal trauma or sinus opacification. Extracranial soft tissues show no evidence of acute pathology. Mastoid air cells and middle ear cavities are normally aerated. Bilateral cataract surgical changes and cavernous carotid atherosclerotic calcifications are noted. Negative noncontrast CT study. Specifically, there is no evidence of intracranial hemorrhage or mass effect, and no calvarial traumatic findings are noted. Ct Facial Bones Wo Contrast    Result Date: 3/6/2020  Maxillofacial CT Patient MRN:  [id number] : 1927 Age: 80 years Gender: Female Order Date:  3/6/2020 1:00 PM EXAM: CT FACIAL BONES WO CONTRAST NUMBER OF IMAGES:  26 INDICATION: Facial pain following injury, left eyebrow laceration and chin contusion COMPARISON: Contemporaneous head CT imaging showed no acute intracranial pathology. CT Scan of the Facial bones with Coronal MPR Reconstructions: TECHNIQUE: Helical noncontrast images were obtained through the facial structures, and reformatted images were reviewed in soft tissue and bone window settings in all 3 imaging planes. Low-dose CT  acquisition technique included one of following options; 1 . Automated exposure control, 2. Adjustment of MA and or KV according to patient's size or 3. Use of iterative reconstruction. FINDINGS: The bony orbits show no evidence of fracture, and the ocular globes and retrobulbar soft tissues show no acute findings. Bilateral cataract surgical changes are present. The nasal septum is midline, the anterior spine of maxilla is intact, and the nasal bones are intact. Paranasal sinuses are normally aerated. Images through the upper cervical spine show age-related demineralization and degenerative change as well as the presence of an external cervical immobilization collar. There is no evidence of an acute fracture, and cranial cervical alignment is preserved.  A small fluid

## 2020-03-11 ENCOUNTER — TELEPHONE (OUTPATIENT)
Dept: ORTHOPEDIC SURGERY | Age: 85
End: 2020-03-11

## 2020-03-18 ENCOUNTER — HOSPITAL ENCOUNTER (OUTPATIENT)
Dept: GENERAL RADIOLOGY | Age: 85
Discharge: HOME | End: 2020-03-20
Payer: MEDICARE

## 2020-03-18 ENCOUNTER — TELEPHONE (OUTPATIENT)
Dept: ORTHOPEDIC SURGERY | Age: 85
End: 2020-03-18

## 2020-03-18 ENCOUNTER — OFFICE VISIT (OUTPATIENT)
Dept: ORTHOPEDIC SURGERY | Age: 85
End: 2020-03-18
Payer: MEDICARE

## 2020-03-18 VITALS — DIASTOLIC BLOOD PRESSURE: 75 MMHG | SYSTOLIC BLOOD PRESSURE: 161 MMHG | HEART RATE: 75 BPM

## 2020-03-18 PROCEDURE — 99024 POSTOP FOLLOW-UP VISIT: CPT | Performed by: PHYSICIAN ASSISTANT

## 2020-03-18 PROCEDURE — 99212 OFFICE O/P EST SF 10 MIN: CPT | Performed by: ORTHOPAEDIC SURGERY

## 2020-03-18 PROCEDURE — 73030 X-RAY EXAM OF SHOULDER: CPT

## 2020-03-18 RX ORDER — DEXLANSOPRAZOLE 60 MG/1
CAPSULE, DELAYED RELEASE ORAL
COMMUNITY
End: 2020-06-24

## 2020-03-18 NOTE — TELEPHONE ENCOUNTER
Call placed to [facility] to inquire if pt was coming to appointment today, spoke with Del malave, [facility]. Per [place] they are limiting residents leaving but also informed pt is now hospice. Spoke with Humble Sanz PA-C, no need to see pt now if they are hospice. Informed [place] appointment would be cancelled today with no need for follow up at this time. Del malave verbalized understanding, to call office with any questions/concerns.

## 2020-03-18 NOTE — PROGRESS NOTES
Chief Complaint   Patient presents with    Follow-up     Lt humerus Fx c/o pain 8/10     DOI: 3/6/2020- left proximal humerus fracture, nonop    SUBJECTIVE: Tamy Vincent is a 80 y.o. female who is right hand dominant who presents to the office today for follow up on the above injury. Patient presents with her daughter. Currently in [place] for rehab. Is in new sling with abduction pillow, states abduction pillow is bothersome due to strap around the back and her history of kyphoplasty. Patient states she is unable to sit up for long periods of time in chair due to her back pain. Patient states pain is controlled with medication at facility, denies any other complaints or parasthesias on exam today. Review of Systems -   General ROS: negative for - chills, fatigue, fever or night sweats  Respiratory ROS: no cough, shortness of breath, or wheezing  Cardiovascular ROS: no chest pain or dyspnea on exertion  Gastrointestinal ROS: no abdominal pain, change in bowel habits, or black or bloody stools  Genitourinary: no hematuria, dysuria, or incontinence   Musculoskeletal ROS:see above  Neurological ROS: no TIA or stroke symptoms       OBJECTIVE:   Alert and oriented X 3, no acute distress, respirations easy and unlabored with no audible wheezes, skin warm and dry, speech and dress appropriate for noted age, affect euthymic. Extremity:  Left Upper Extremity  Skin is clean dry and intact  Radial pulse palpable, fingers warm with BCR  Flex/extension intact to wrist, thumb and fingers  Finger opposition intact  Finger adduction/abduction intact  Finger crossover intact  Subjectively states sensation intact to radial/medial/ulnar distribution  Abduction pillow removed from sling which greatly improved comport     XR: 3/18/20 3V of the left shoulder demonstrate a 3 part impacted proximal humerus fracture without interval change in alignment from hospital imaging. No other acute fracture or dislocation noted.     BP (!) 161/75 (Site: Right Upper Arm, Position: Sitting, Cuff Size: Medium Adult)   Pulse 75     ASSESSMENT:     Diagnosis Orders   1. Other closed nondisplaced fracture of proximal end of left humerus with routine healing, subsequent encounter         PLAN:  [address] [facility]  nonweightbearing on the left upper extremity  Come out of sling every day 2-3 times for ROM of elbow/wrist hand  Start proximal humerus protocol with therapy starting at the 2 week elke- protocol attached  Sling for comfort  Ice to the left shoulder for pain and swelling  Pain control per facility physician  Patient to be transitioned back to bed from seated position if having back pain, no longer than 6 hours seated in chair    Will need new x-rays in 2-3 weeks of left shoulder, to be done with mobile x-ray and disc couriered to Ortho Office for review  Follow up in 2 months          Electronically signed by Freddie Mcgraw PA-C on 3/18/2020 at 1:16 PM  Note: This report was completed using [employer] voiced recognition software. Every effort has been made to ensure accuracy; however, inadvertent computerized transcription errors may be present.

## 2020-04-06 ENCOUNTER — TELEPHONE (OUTPATIENT)
Dept: ORTHOPEDIC SURGERY | Age: 85
End: 2020-04-06

## 2020-04-06 NOTE — TELEPHONE ENCOUNTER
Colleen Lincoln from [facility] home care called and needs a home care order for [facility] for SN/PT/OT. She is d/c from [facility] today.

## 2020-04-10 ENCOUNTER — TELEPHONE (OUTPATIENT)
Dept: ORTHOPEDIC SURGERY | Age: 85
End: 2020-04-10

## 2020-04-13 ENCOUNTER — HOSPITAL ENCOUNTER (EMERGENCY)
Age: 85
Discharge: HOME | End: 2020-04-13
Attending: EMERGENCY MEDICINE
Payer: MEDICARE

## 2020-04-13 ENCOUNTER — APPOINTMENT (OUTPATIENT)
Dept: GENERAL RADIOLOGY | Age: 85
End: 2020-04-13
Payer: MEDICARE

## 2020-04-13 ENCOUNTER — APPOINTMENT (OUTPATIENT)
Dept: ULTRASOUND IMAGING | Age: 85
End: 2020-04-13
Payer: MEDICARE

## 2020-04-13 ENCOUNTER — TELEPHONE (OUTPATIENT)
Dept: ORTHOPEDIC SURGERY | Age: 85
End: 2020-04-13

## 2020-04-13 VITALS
SYSTOLIC BLOOD PRESSURE: 161 MMHG | HEART RATE: 80 BPM | DIASTOLIC BLOOD PRESSURE: 84 MMHG | WEIGHT: 115 LBS | BODY MASS INDEX: 21.73 KG/M2 | RESPIRATION RATE: 17 BRPM | OXYGEN SATURATION: 97 % | TEMPERATURE: 97.5 F

## 2020-04-13 LAB
ALBUMIN SERPL-MCNC: 4.3 G/DL (ref 3.5–5.2)
ALP BLD-CCNC: 75 U/L (ref 35–104)
ALT SERPL-CCNC: 7 U/L (ref 0–32)
ANION GAP SERPL CALCULATED.3IONS-SCNC: 14 MMOL/L (ref 7–16)
APTT: 33.8 SEC (ref 24.5–35.1)
AST SERPL-CCNC: 16 U/L (ref 0–31)
BASOPHILS ABSOLUTE: 0.05 E9/L (ref 0–0.2)
BASOPHILS RELATIVE PERCENT: 1 % (ref 0–2)
BILIRUB SERPL-MCNC: 0.4 MG/DL (ref 0–1.2)
BUN BLDV-MCNC: 15 MG/DL (ref 8–23)
CALCIUM SERPL-MCNC: 9.3 MG/DL (ref 8.6–10.2)
CHLORIDE BLD-SCNC: 99 MMOL/L (ref 98–107)
CO2: 22 MMOL/L (ref 22–29)
CREAT SERPL-MCNC: 0.8 MG/DL (ref 0.5–1)
EOSINOPHILS ABSOLUTE: 0.1 E9/L (ref 0.05–0.5)
EOSINOPHILS RELATIVE PERCENT: 2 % (ref 0–6)
GFR AFRICAN AMERICAN: >60
GFR NON-AFRICAN AMERICAN: >60 ML/MIN/1.73
GLUCOSE BLD-MCNC: 113 MG/DL (ref 74–99)
HCT VFR BLD CALC: 38 % (ref 34–48)
HEMOGLOBIN: 12.4 G/DL (ref 11.5–15.5)
IMMATURE GRANULOCYTES #: 0.02 E9/L
IMMATURE GRANULOCYTES %: 0.4 % (ref 0–5)
INR BLD: 1.1
LYMPHOCYTES ABSOLUTE: 1.54 E9/L (ref 1.5–4)
LYMPHOCYTES RELATIVE PERCENT: 31.6 % (ref 20–42)
MCH RBC QN AUTO: 31 PG (ref 26–35)
MCHC RBC AUTO-ENTMCNC: 32.6 % (ref 32–34.5)
MCV RBC AUTO: 95 FL (ref 80–99.9)
MONOCYTES ABSOLUTE: 0.27 E9/L (ref 0.1–0.95)
MONOCYTES RELATIVE PERCENT: 5.5 % (ref 2–12)
NEUTROPHILS ABSOLUTE: 2.9 E9/L (ref 1.8–7.3)
NEUTROPHILS RELATIVE PERCENT: 59.5 % (ref 43–80)
PDW BLD-RTO: 12.7 FL (ref 11.5–15)
PLATELET # BLD: 214 E9/L (ref 130–450)
PMV BLD AUTO: 9.9 FL (ref 7–12)
POTASSIUM SERPL-SCNC: 4.7 MMOL/L (ref 3.5–5)
PROTHROMBIN TIME: 11.9 SEC (ref 9.3–12.4)
RBC # BLD: 4 E12/L (ref 3.5–5.5)
SODIUM BLD-SCNC: 135 MMOL/L (ref 132–146)
TOTAL PROTEIN: 6.6 G/DL (ref 6.4–8.3)
WBC # BLD: 4.9 E9/L (ref 4.5–11.5)

## 2020-04-13 PROCEDURE — 85025 COMPLETE CBC W/AUTO DIFF WBC: CPT

## 2020-04-13 PROCEDURE — 73060 X-RAY EXAM OF HUMERUS: CPT

## 2020-04-13 PROCEDURE — 80053 COMPREHEN METABOLIC PANEL: CPT

## 2020-04-13 PROCEDURE — 93971 EXTREMITY STUDY: CPT

## 2020-04-13 PROCEDURE — 6360000002 HC RX W HCPCS: Performed by: NURSE PRACTITIONER

## 2020-04-13 PROCEDURE — 2580000003 HC RX 258: Performed by: NURSE PRACTITIONER

## 2020-04-13 PROCEDURE — 85730 THROMBOPLASTIN TIME PARTIAL: CPT

## 2020-04-13 PROCEDURE — 85610 PROTHROMBIN TIME: CPT

## 2020-04-13 PROCEDURE — 96366 THER/PROPH/DIAG IV INF ADDON: CPT

## 2020-04-13 PROCEDURE — 99284 EMERGENCY DEPT VISIT MOD MDM: CPT

## 2020-04-13 PROCEDURE — 73090 X-RAY EXAM OF FOREARM: CPT

## 2020-04-13 PROCEDURE — 96365 THER/PROPH/DIAG IV INF INIT: CPT

## 2020-04-13 RX ORDER — CEPHALEXIN 500 MG/1
500 CAPSULE ORAL 3 TIMES DAILY
Qty: 21 CAPSULE | Refills: 0 | Status: SHIPPED | OUTPATIENT
Start: 2020-04-13 | End: 2020-04-20

## 2020-04-13 RX ADMIN — CEFTRIAXONE SODIUM 1 G: 1 INJECTION, POWDER, FOR SOLUTION INTRAMUSCULAR; INTRAVENOUS at 15:59

## 2020-04-13 ASSESSMENT — PAIN DESCRIPTION - ORIENTATION: ORIENTATION: LEFT

## 2020-04-13 ASSESSMENT — PAIN DESCRIPTION - DESCRIPTORS: DESCRIPTORS: TENDER;SORE

## 2020-04-13 ASSESSMENT — PAIN SCALES - GENERAL: PAINLEVEL_OUTOF10: 5

## 2020-04-13 ASSESSMENT — PAIN DESCRIPTION - LOCATION: LOCATION: ARM

## 2020-04-13 ASSESSMENT — PAIN DESCRIPTION - PAIN TYPE: TYPE: ACUTE PAIN

## 2020-04-13 NOTE — ED PROVIDER NOTES
T11  -- TIME UNDETERMINED performed by Iram Zuñiga DO at [facility]      biopsy    TONSILLECTOMY     Social History:  reports that she quit smoking about 24 years ago. Her smoking use included cigarettes. She started smoking about 60 years ago. She smoked 1.00 pack per day. She has never used smokeless tobacco. She reports that she does not drink alcohol or use drugs. Family History: family history includes Cancer in her brother, brother, and mother; Emphysema in her father; Heart Disease in her brother; Rheum Arthritis in her sister. Allergies: Tramadol; Amlodipine besylate; Bentyl [dicyclomine hcl]; Codeine; Lactose intolerance (gi); Mobic [meloxicam]; Monosodium glutamate; Other; Pantoprazole; Prednisone; and Talwin [pentazocine]    Physical Exam           ED Triage Vitals   BP Temp Temp Source Pulse Resp SpO2 Height Weight   04/13/20 1404 04/13/20 1355 04/13/20 1355 04/13/20 1355 04/13/20 1355 04/13/20 1355 -- 04/13/20 1404   (!) 173/94 97.5 °F (36.4 °C) Temporal 72 18 92 %  115 lb (52.2 kg)      Oxygen Saturation Interpretation: Normal.    · Constitutional:  Alert, development consistent with age. · HEENT:  NC/NT. Airway patent. · Neck:  Normal ROM. Supple. · Extremity(s):  Left: upper extremity. Tenderness:  mild. Swelling: Mild left lateral forearm              Deformity: No.                 ROM: full range with pain. Skin:  Erythema warmth and ecchymosis to left lateral forearm, no erythema, rash or   wounds noted. Neurovascular: Motor deficit: none. Sensory deficit: none. Pulse deficit: none. Capillary refill: normal.  · Lymphatics: No lymphangitis or adenopathy noted. · Neurological:  Oriented. Motor functions intact.     Lab / Imaging Results   (All laboratory and radiology results have been personally reviewed by myself)  Labs:  Results for orders

## 2020-04-14 ENCOUNTER — CARE COORDINATION (OUTPATIENT)
Dept: CARE COORDINATION | Age: 85
End: 2020-04-14

## 2020-04-17 ENCOUNTER — TELEPHONE (OUTPATIENT)
Dept: ORTHOPEDIC SURGERY | Age: 85
End: 2020-04-17

## 2020-04-20 ENCOUNTER — TELEPHONE (OUTPATIENT)
Dept: ORTHOPEDIC SURGERY | Age: 85
End: 2020-04-20

## 2020-04-20 NOTE — TELEPHONE ENCOUNTER
Updated home orders, [facility] OT-- after review of XR taken on 4/1/2020- this showed no change in alignment, interval healing is appreciated.  OT can progress patient to the 6 week elke on proximal humerus protocol  Electronically signed by Madhav Figueroa PA-C on 4/20/2020 at 1:14 PM

## 2020-04-28 ENCOUNTER — CARE COORDINATION (OUTPATIENT)
Dept: CARE COORDINATION | Age: 85
End: 2020-04-28

## 2020-04-28 NOTE — CARE COORDINATION
Patient resolved from the Care Transitions episode on 4/28/2020  Patient/family has been provided the following resources and education related to COVID-19:                         Signs, symptoms and red flags related to COVID-19            CDC exposure and quarantine guidelines            Conduit exposure contact - [phone]            Contact for their local Department of Health                 Patient currently reports that the following symptoms have improved:  no new/worsening symptoms     No further outreach scheduled with this CTN/ACM. Episode of Care resolved. Patient has this CTN/ACM contact information if future needs arise.

## 2020-05-04 ENCOUNTER — HOSPITAL ENCOUNTER (OUTPATIENT)
Age: 85
Discharge: HOME | End: 2020-05-06
Payer: MEDICARE

## 2020-05-04 ENCOUNTER — HOSPITAL ENCOUNTER (OUTPATIENT)
Dept: GENERAL RADIOLOGY | Age: 85
Discharge: HOME | End: 2020-05-06
Payer: MEDICARE

## 2020-05-04 PROCEDURE — 73030 X-RAY EXAM OF SHOULDER: CPT

## 2020-05-13 ENCOUNTER — TELEMEDICINE (OUTPATIENT)
Dept: ORTHOPEDIC SURGERY | Age: 85
End: 2020-05-13
Payer: MEDICARE

## 2020-05-13 PROCEDURE — 99214 OFFICE O/P EST MOD 30 MIN: CPT | Performed by: PHYSICIAN ASSISTANT

## 2020-05-13 NOTE — PROGRESS NOTES
hurts her from her fall. Says that she has pre-existing moderate to severe osteoarthritis of the right knee and that she has had several corticosteroid knee injections the past which have not provided relief. States she had an allergic reaction to topical pain relief creams and gels in the past.  She is also allergic to meloxicam.  States that she has been using an over-the-counter knee brace for stability while she is ambulating. Has no other orthopedic complaints at this time. Review of Systems   Constitutional: Negative for fever, chills, diaphoresis, appetite change and fatigue. HENT: Negative for dental issues, hearing loss and tinnitus. Negative for congestion, sinus pressure, sneezing, sore throat. Negative for headache. Eyes: Negative for visual disturbance, blurred and double vision. Negative for pain, discharge, redness and itching  Respiratory: Negative for cough, shortness of breath and wheezing. Cardiovascular: Negative for chest pain, palpitations and leg swelling. No dyspnea on exertion   Gastrointestinal:   Negative for nausea, vomiting, abdominal pain, diarrhea, constipation  or black or bloody. Hematologic\Lymphatic:  negative for bleeding, petechiae,   Genitourinary: Negative for hematuria and difficulty urinating. Musculoskeletal: Negative for neck pain and stiffness. Negative for back pain, see HPI  Skin: Negative for pallor, rash and wound. Neurological: Negative for dizziness, tremors, seizures, weakness, light-headedness, no TIA or stroke symptoms. No numbness and headaches. Psychiatric/Behavioral: Negative. OBJECTIVE:      Physical Examination:   General appearance: alert, well appearing, and in no distress,  normal appearing weight.  No visible signs of trauma   Mental status: alert, oriented to person, place, and time, normal mood, behavior, speech, dress, motor activity, and thought processes  Resp:   resp easy and unlabored, no audible wheezes note, normal

## 2020-06-01 ENCOUNTER — TELEPHONE (OUTPATIENT)
Dept: ORTHOPEDIC SURGERY | Age: 85
End: 2020-06-01

## 2020-06-01 ENCOUNTER — PATIENT MESSAGE (OUTPATIENT)
Dept: ORTHOPEDIC SURGERY | Age: 85
End: 2020-06-01

## 2020-06-24 ENCOUNTER — HOSPITAL ENCOUNTER (OUTPATIENT)
Dept: GENERAL RADIOLOGY | Age: 85
Discharge: HOME | End: 2020-06-26
Payer: MEDICARE

## 2020-06-24 ENCOUNTER — OFFICE VISIT (OUTPATIENT)
Dept: ORTHOPEDIC SURGERY | Age: 85
End: 2020-06-24
Payer: MEDICARE

## 2020-06-24 VITALS
BODY MASS INDEX: 21.71 KG/M2 | TEMPERATURE: 97.7 F | SYSTOLIC BLOOD PRESSURE: 163 MMHG | DIASTOLIC BLOOD PRESSURE: 88 MMHG | HEART RATE: 77 BPM | HEIGHT: 61 IN | WEIGHT: 115 LBS

## 2020-06-24 PROCEDURE — 1036F TOBACCO NON-USER: CPT | Performed by: PHYSICIAN ASSISTANT

## 2020-06-24 PROCEDURE — G8420 CALC BMI NORM PARAMETERS: HCPCS | Performed by: PHYSICIAN ASSISTANT

## 2020-06-24 PROCEDURE — 99212 OFFICE O/P EST SF 10 MIN: CPT | Performed by: PHYSICIAN ASSISTANT

## 2020-06-24 PROCEDURE — 1090F PRES/ABSN URINE INCON ASSESS: CPT | Performed by: PHYSICIAN ASSISTANT

## 2020-06-24 PROCEDURE — 99213 OFFICE O/P EST LOW 20 MIN: CPT | Performed by: PHYSICIAN ASSISTANT

## 2020-06-24 PROCEDURE — 1123F ACP DISCUSS/DSCN MKR DOCD: CPT | Performed by: PHYSICIAN ASSISTANT

## 2020-06-24 PROCEDURE — 4040F PNEUMOC VAC/ADMIN/RCVD: CPT | Performed by: PHYSICIAN ASSISTANT

## 2020-06-24 PROCEDURE — G8427 DOCREV CUR MEDS BY ELIG CLIN: HCPCS | Performed by: PHYSICIAN ASSISTANT

## 2020-06-24 PROCEDURE — 73030 X-RAY EXAM OF SHOULDER: CPT

## 2020-09-23 ENCOUNTER — OFFICE VISIT (OUTPATIENT)
Dept: ORTHOPEDIC SURGERY | Age: 85
End: 2020-09-23
Payer: MEDICARE

## 2020-09-23 ENCOUNTER — HOSPITAL ENCOUNTER (OUTPATIENT)
Dept: GENERAL RADIOLOGY | Age: 85
Discharge: HOME | End: 2020-09-25
Payer: MEDICARE

## 2020-09-23 VITALS — HEART RATE: 82 BPM | SYSTOLIC BLOOD PRESSURE: 146 MMHG | DIASTOLIC BLOOD PRESSURE: 80 MMHG | TEMPERATURE: 98.3 F

## 2020-09-23 PROCEDURE — 1090F PRES/ABSN URINE INCON ASSESS: CPT | Performed by: NURSE PRACTITIONER

## 2020-09-23 PROCEDURE — 73030 X-RAY EXAM OF SHOULDER: CPT

## 2020-09-23 PROCEDURE — 1036F TOBACCO NON-USER: CPT | Performed by: NURSE PRACTITIONER

## 2020-09-23 PROCEDURE — 1123F ACP DISCUSS/DSCN MKR DOCD: CPT | Performed by: NURSE PRACTITIONER

## 2020-09-23 PROCEDURE — 99213 OFFICE O/P EST LOW 20 MIN: CPT | Performed by: NURSE PRACTITIONER

## 2020-09-23 PROCEDURE — G8420 CALC BMI NORM PARAMETERS: HCPCS | Performed by: NURSE PRACTITIONER

## 2020-09-23 PROCEDURE — G8427 DOCREV CUR MEDS BY ELIG CLIN: HCPCS | Performed by: NURSE PRACTITIONER

## 2020-09-23 PROCEDURE — 99212 OFFICE O/P EST SF 10 MIN: CPT | Performed by: NURSE PRACTITIONER

## 2020-09-23 PROCEDURE — 4040F PNEUMOC VAC/ADMIN/RCVD: CPT | Performed by: NURSE PRACTITIONER

## 2020-09-23 RX ORDER — SUMATRIPTAN 100 MG/1
100 TABLET, FILM COATED ORAL
COMMUNITY
End: 2022-03-11

## 2020-09-23 RX ORDER — ALBUTEROL SULFATE 90 UG/1
2 AEROSOL, METERED RESPIRATORY (INHALATION) EVERY 6 HOURS PRN
COMMUNITY
Start: 2020-09-04

## 2020-09-23 NOTE — PROGRESS NOTES
Chief Complaint   Patient presents with    Follow Up After Procedure     States her left arm is sore and bothersome occassionally. Last night it was very sore. Home exercises every other day or so. DOI: 3/6/2020- left proximal humerus fracture nonop    SUBJECTIVE: This is a pleasant 80-year-old female who we are following for her non-op proximal left humerus fracture. She is now 6 months out. She is accompanied by her daughter today. She still has some pain over the shoulder. She is using the salon poss pain patches as needed. She is also taking Tylenol as needed. Patient is for the most part able to do all of her ADLs that she needs to do to get through her day. She denies any recent falls or injuries. She is doing her home exercises several times through the week. Her daughter is concerned that she is doing them too frequently. Patient is right-hand dominant. She denies any numbness or tingling. Review of Systems   Constitutional: Negative for fever, chills, diaphoresis, appetite change and fatigue. HENT: Negative for dental issues, hearing loss and tinnitus. Negative for congestion, sinus pressure, sneezing, sore throat. Negative for headache. Eyes: Negative for visual disturbance, blurred and double vision. Negative for pain, discharge, redness and itching  Respiratory: Negative for cough, shortness of breath and wheezing. Cardiovascular: Negative for chest pain, palpitations and leg swelling. No dyspnea on exertion   Gastrointestinal:   Negative for nausea, vomiting, abdominal pain, diarrhea, constipation  or black or bloody. Hematologic\Lymphatic:  negative for bleeding, petechiae,   Genitourinary: Negative for hematuria and difficulty urinating. Musculoskeletal: Negative for neck pain and stiffness. Negative for back pain, see HPI  Skin: Negative for pallor, rash and wound.    Neurological: Negative for dizziness, tremors, seizures, weakness, light-headedness, no TIA or stroke of proximal end of left humerus with routine healing, subsequent encounter         PLAN:  Continue to work with some light progressive weightbearing. No more than 5 to 8 pounds. Continue using assistive devices, cane. Continue home exercises working on strengthening and range of motion. Over-the-counter analgesics as needed for pain control, Tylenol. Okay to continue topical analgesics or the salon poss patches. Follow-up in 3 months. Call sooner with any problems or concerns. Electronically signed by NISHANT Moore CNP on 9/30/2020 at 8:46 AM    Note: This report was completed using [employer] voiced recognition software.  Every effort has been made to ensure accuracy; however, inadvertent computerized transcription errors may be present.

## 2020-09-23 NOTE — PATIENT INSTRUCTIONS
Continue to work with some light progressive weightbearing. No more than 5 to 8 pounds. Continue using assistive devices, cane. Continue home exercises working on strengthening and range of motion. Over-the-counter analgesics as needed for pain control, Tylenol. Okay to continue topical analgesics or the salon poss patches. Follow-up in 3 months. Call sooner with any problems or concerns.

## 2020-12-29 ENCOUNTER — HOSPITAL ENCOUNTER (EMERGENCY)
Age: 85
Discharge: HOME | End: 2020-12-29
Attending: EMERGENCY MEDICINE
Payer: MEDICARE

## 2020-12-29 ENCOUNTER — APPOINTMENT (OUTPATIENT)
Dept: CT IMAGING | Age: 85
End: 2020-12-29
Payer: MEDICARE

## 2020-12-29 VITALS
OXYGEN SATURATION: 97 % | SYSTOLIC BLOOD PRESSURE: 150 MMHG | HEIGHT: 61 IN | RESPIRATION RATE: 16 BRPM | DIASTOLIC BLOOD PRESSURE: 57 MMHG | BODY MASS INDEX: 22.28 KG/M2 | WEIGHT: 118 LBS | HEART RATE: 88 BPM | TEMPERATURE: 98 F

## 2020-12-29 LAB
ALBUMIN SERPL-MCNC: 4.6 G/DL (ref 3.5–5.2)
ALP BLD-CCNC: 104 U/L (ref 35–104)
ALT SERPL-CCNC: 11 U/L (ref 0–32)
ANION GAP SERPL CALCULATED.3IONS-SCNC: 14 MMOL/L (ref 7–16)
AST SERPL-CCNC: 20 U/L (ref 0–31)
BACTERIA: ABNORMAL /HPF
BASOPHILS ABSOLUTE: 0.03 E9/L (ref 0–0.2)
BASOPHILS RELATIVE PERCENT: 0.5 % (ref 0–2)
BILIRUB SERPL-MCNC: 0.6 MG/DL (ref 0–1.2)
BILIRUBIN URINE: NEGATIVE
BLOOD, URINE: ABNORMAL
BUN BLDV-MCNC: 13 MG/DL (ref 8–23)
CALCIUM SERPL-MCNC: 9.9 MG/DL (ref 8.6–10.2)
CHLORIDE BLD-SCNC: 95 MMOL/L (ref 98–107)
CLARITY: CLEAR
CO2: 22 MMOL/L (ref 22–29)
COLOR: YELLOW
CREAT SERPL-MCNC: 0.8 MG/DL (ref 0.5–1)
EKG ATRIAL RATE: 85 BPM
EKG P AXIS: 62 DEGREES
EKG P-R INTERVAL: 164 MS
EKG Q-T INTERVAL: 368 MS
EKG QRS DURATION: 84 MS
EKG QTC CALCULATION (BAZETT): 437 MS
EKG R AXIS: 1 DEGREES
EKG T AXIS: 53 DEGREES
EKG VENTRICULAR RATE: 85 BPM
EOSINOPHILS ABSOLUTE: 0.07 E9/L (ref 0.05–0.5)
EOSINOPHILS RELATIVE PERCENT: 1.2 % (ref 0–6)
EPITHELIAL CELLS, UA: ABNORMAL /HPF
GFR AFRICAN AMERICAN: >60
GFR NON-AFRICAN AMERICAN: >60 ML/MIN/1.73
GLUCOSE BLD-MCNC: 118 MG/DL (ref 74–99)
GLUCOSE URINE: NEGATIVE MG/DL
HCT VFR BLD CALC: 38.5 % (ref 34–48)
HEMOGLOBIN: 12.9 G/DL (ref 11.5–15.5)
IMMATURE GRANULOCYTES #: 0.02 E9/L
IMMATURE GRANULOCYTES %: 0.3 % (ref 0–5)
KETONES, URINE: 15 MG/DL
LACTIC ACID: 1.2 MMOL/L (ref 0.5–2.2)
LEUKOCYTE ESTERASE, URINE: NEGATIVE
LIPASE: 17 U/L (ref 13–60)
LYMPHOCYTES ABSOLUTE: 0.95 E9/L (ref 1.5–4)
LYMPHOCYTES RELATIVE PERCENT: 16.1 % (ref 20–42)
MCH RBC QN AUTO: 31.4 PG (ref 26–35)
MCHC RBC AUTO-ENTMCNC: 33.5 % (ref 32–34.5)
MCV RBC AUTO: 93.7 FL (ref 80–99.9)
MONOCYTES ABSOLUTE: 0.32 E9/L (ref 0.1–0.95)
MONOCYTES RELATIVE PERCENT: 5.4 % (ref 2–12)
NEUTROPHILS ABSOLUTE: 4.5 E9/L (ref 1.8–7.3)
NEUTROPHILS RELATIVE PERCENT: 76.5 % (ref 43–80)
NITRITE, URINE: NEGATIVE
PDW BLD-RTO: 12.8 FL (ref 11.5–15)
PH UA: 6 (ref 5–9)
PLATELET # BLD: 237 E9/L (ref 130–450)
PMV BLD AUTO: 9.7 FL (ref 7–12)
POTASSIUM REFLEX MAGNESIUM: 4 MMOL/L (ref 3.5–5)
PROTEIN UA: NEGATIVE MG/DL
RBC # BLD: 4.11 E12/L (ref 3.5–5.5)
RBC UA: ABNORMAL /HPF (ref 0–2)
SODIUM BLD-SCNC: 131 MMOL/L (ref 132–146)
SPECIFIC GRAVITY UA: 1.01 (ref 1–1.03)
TOTAL PROTEIN: 7.4 G/DL (ref 6.4–8.3)
TROPONIN: <0.01 NG/ML (ref 0–0.03)
UROBILINOGEN, URINE: 0.2 E.U./DL
WBC # BLD: 5.9 E9/L (ref 4.5–11.5)
WBC UA: ABNORMAL /HPF (ref 0–5)

## 2020-12-29 PROCEDURE — 6370000000 HC RX 637 (ALT 250 FOR IP): Performed by: EMERGENCY MEDICINE

## 2020-12-29 PROCEDURE — 74177 CT ABD & PELVIS W/CONTRAST: CPT

## 2020-12-29 PROCEDURE — 6360000002 HC RX W HCPCS: Performed by: STUDENT IN AN ORGANIZED HEALTH CARE EDUCATION/TRAINING PROGRAM

## 2020-12-29 PROCEDURE — 85025 COMPLETE CBC W/AUTO DIFF WBC: CPT

## 2020-12-29 PROCEDURE — 83690 ASSAY OF LIPASE: CPT

## 2020-12-29 PROCEDURE — 99285 EMERGENCY DEPT VISIT HI MDM: CPT

## 2020-12-29 PROCEDURE — 96374 THER/PROPH/DIAG INJ IV PUSH: CPT

## 2020-12-29 PROCEDURE — 93005 ELECTROCARDIOGRAM TRACING: CPT | Performed by: STUDENT IN AN ORGANIZED HEALTH CARE EDUCATION/TRAINING PROGRAM

## 2020-12-29 PROCEDURE — 81001 URINALYSIS AUTO W/SCOPE: CPT

## 2020-12-29 PROCEDURE — 2580000003 HC RX 258: Performed by: RADIOLOGY

## 2020-12-29 PROCEDURE — 83605 ASSAY OF LACTIC ACID: CPT

## 2020-12-29 PROCEDURE — 84484 ASSAY OF TROPONIN QUANT: CPT

## 2020-12-29 PROCEDURE — 6360000004 HC RX CONTRAST MEDICATION: Performed by: RADIOLOGY

## 2020-12-29 PROCEDURE — 2580000003 HC RX 258: Performed by: STUDENT IN AN ORGANIZED HEALTH CARE EDUCATION/TRAINING PROGRAM

## 2020-12-29 PROCEDURE — 80053 COMPREHEN METABOLIC PANEL: CPT

## 2020-12-29 RX ORDER — 0.9 % SODIUM CHLORIDE 0.9 %
500 INTRAVENOUS SOLUTION INTRAVENOUS ONCE
Status: DISCONTINUED | OUTPATIENT
Start: 2020-12-29 | End: 2020-12-29

## 2020-12-29 RX ORDER — ONDANSETRON 4 MG/1
4 TABLET, ORALLY DISINTEGRATING ORAL EVERY 8 HOURS PRN
Qty: 24 TABLET | Refills: 0 | Status: SHIPPED | OUTPATIENT
Start: 2020-12-29 | End: 2022-02-17

## 2020-12-29 RX ORDER — SODIUM CHLORIDE 0.9 % (FLUSH) 0.9 %
10 SYRINGE (ML) INJECTION PRN
Status: DISCONTINUED | OUTPATIENT
Start: 2020-12-29 | End: 2020-12-29 | Stop reason: HOSPADM

## 2020-12-29 RX ORDER — DOCUSATE SODIUM 100 MG/1
100 CAPSULE, LIQUID FILLED ORAL 2 TIMES DAILY
Qty: 60 CAPSULE | Refills: 0 | Status: SHIPPED | OUTPATIENT
Start: 2020-12-29 | End: 2021-01-28

## 2020-12-29 RX ORDER — SENNOSIDES 8.6 MG
1 TABLET ORAL DAILY
Qty: 30 TABLET | Refills: 0 | Status: SHIPPED | OUTPATIENT
Start: 2020-12-29 | End: 2021-01-28

## 2020-12-29 RX ORDER — ONDANSETRON 4 MG/1
4 TABLET, ORALLY DISINTEGRATING ORAL ONCE
Status: COMPLETED | OUTPATIENT
Start: 2020-12-29 | End: 2020-12-29

## 2020-12-29 RX ORDER — 0.9 % SODIUM CHLORIDE 0.9 %
1000 INTRAVENOUS SOLUTION INTRAVENOUS ONCE
Status: COMPLETED | OUTPATIENT
Start: 2020-12-29 | End: 2020-12-29

## 2020-12-29 RX ORDER — FENTANYL CITRATE 50 UG/ML
25 INJECTION, SOLUTION INTRAMUSCULAR; INTRAVENOUS ONCE
Status: COMPLETED | OUTPATIENT
Start: 2020-12-29 | End: 2020-12-29

## 2020-12-29 RX ADMIN — FENTANYL CITRATE 25 MCG: 50 INJECTION, SOLUTION INTRAMUSCULAR; INTRAVENOUS at 06:16

## 2020-12-29 RX ADMIN — IOPAMIDOL 75 ML: 755 INJECTION, SOLUTION INTRAVENOUS at 09:13

## 2020-12-29 RX ADMIN — MAGNESIUM CITRATE 300 ML: 1.75 LIQUID ORAL at 10:42

## 2020-12-29 RX ADMIN — Medication 10 ML: at 09:13

## 2020-12-29 RX ADMIN — SODIUM CHLORIDE 1000 ML: 9 INJECTION, SOLUTION INTRAVENOUS at 09:31

## 2020-12-29 RX ADMIN — ONDANSETRON 4 MG: 4 TABLET, ORALLY DISINTEGRATING ORAL at 12:34

## 2020-12-29 ASSESSMENT — PAIN SCALES - GENERAL
PAINLEVEL_OUTOF10: 4
PAINLEVEL_OUTOF10: 1
PAINLEVEL_OUTOF10: 9
PAINLEVEL_OUTOF10: 8

## 2020-12-29 ASSESSMENT — ENCOUNTER SYMPTOMS
PHOTOPHOBIA: 0
ABDOMINAL PAIN: 1
ABDOMINAL DISTENTION: 1
DIARRHEA: 0
CONSTIPATION: 0
NAUSEA: 0
VOMITING: 0
HEMATOCHEZIA: 0
HEMATEMESIS: 0
COUGH: 0
SHORTNESS OF BREATH: 0

## 2020-12-29 ASSESSMENT — PAIN DESCRIPTION - LOCATION
LOCATION: ABDOMEN
LOCATION: ABDOMEN

## 2020-12-29 ASSESSMENT — PAIN DESCRIPTION - DESCRIPTORS: DESCRIPTORS: ACHING

## 2020-12-29 ASSESSMENT — PAIN DESCRIPTION - PAIN TYPE
TYPE: ACUTE PAIN
TYPE: ACUTE PAIN

## 2020-12-29 NOTE — ED PROVIDER NOTES
Hola Gonzalez is a 80year old female with PMH of COPD, GERD, IBS, who presented to ED with abdominal pain. Patient states she is having diffuse abdominal pain that is worse left lower and left upper quadrant. Patient's abdominal pain started on Liza Aubrie. Patient called her GI doctor Dr. Juanita Sotelo the day after Sidney and was started on Flagyl and another antibiotic she cannot remember for possible diverticulitis. Patient states that she did have normal bowel movements before starting antibiotics and now is having less. Patient is urinating normally. Patient felt that she was having significant abdominal distention today and increasing pain. Patient called her daughter around 3 AM and called her primary care doctor who advised her to present to emergency department. Patient denies fevers, chills, nausea, vomiting. Patient denies any blood in her stool. Patient does not know if she has had a history of this previous. Patient denies chest pain or shortness of breath. The history is provided by the patient, medical records and the EMS personnel. Abdominal Pain  Pain location:  LUQ and LLQ  Pain quality: cramping and throbbing    Pain severity:  Moderate  Onset quality:  Gradual  Duration:  1 week  Timing:  Constant  Progression:  Worsening  Chronicity:  New  Context: not recent illness, not sick contacts, not suspicious food intake and not trauma    Relieved by:  Nothing  Worsened by:  Nothing  Ineffective treatments:  None tried  Associated symptoms: fatigue    Associated symptoms: no chest pain, no chills, no constipation, no cough, no diarrhea, no dysuria, no fever, no hematemesis, no hematochezia, no melena, no nausea, no shortness of breath and no vomiting    Risk factors: being elderly    Risk factors: has not had multiple surgeries, no NSAID use and no recent hospitalization         Review of Systems   Constitutional: Positive for appetite change and fatigue. Negative for chills and fever. improvement. She called him this morning and was sent here for evaluation. Labs, CT abdomen, supportive care      []   0736 Pt signed out to me by Dr. Lenard Huddleston. Patient is resting in bed comfortably. She is endorsing pain relief with the Fentanyl. No complaints at this time. VSS. [AP]      ED Course User Index  [AP] Adelaida Hernandez MD  [] Tano Gallagher, DO       --------------------------------------------- PAST HISTORY ---------------------------------------------  Past Medical History:  has a past medical history of Acid reflux disease, Arthritis, Chronic back pain, COPD (chronic obstructive pulmonary disease) ([facility] [address] Hearing loss, Hyperlipidemia, Irritable bowel, Meningioma ([facility] [address] Migraine, Neuropathy, Osteoarthritis, Osteoporosis, Thyroid disease, and TIA (transient ischemic attack). Past Surgical History:  has a past surgical history that includes Hysterectomy; Tonsillectomy; Appendectomy; Thyroid surgery; Endoscopy, colon, diagnostic (8/2012); ERCP (10/31/2012); ERCP (12/05/2012); fracture surgery; eye surgery; Cholecystectomy; Fixation Kyphoplasty (08/03/2016); Breast surgery; Colonoscopy; Ankle surgery (1980); Breast biopsy (1978); pr office/outpt visit,procedure only (N/A, 8/1/2018); and blepharoplasty. Social History:  reports that she quit smoking about 24 years ago. Her smoking use included cigarettes. She started smoking about 61 years ago. She smoked 1.00 pack per day. She has never used smokeless tobacco. She reports that she does not drink alcohol or use drugs. Family History: family history includes Cancer in her brother, brother, and mother; Emphysema in her father; Heart Disease in her brother; Rheum Arthritis in her sister. The patients home medications have been reviewed.     Allergies: Tramadol, Amlodipine besylate, Bentyl [dicyclomine hcl], Codeine, Lactose intolerance (gi), Mobic [meloxicam], Monosodium glutamate, Other, Pantoprazole, Prednisone, and Talwin [pentazocine]    -------------------------------------------------- RESULTS -------------------------------------------------  Labs:  Results for orders placed or performed during the hospital encounter of 12/29/20   CBC auto differential   Result Value Ref Range    WBC 5.9 4.5 - 11.5 E9/L    RBC 4.11 3.50 - 5.50 E12/L    Hemoglobin 12.9 11.5 - 15.5 g/dL    Hematocrit 38.5 34.0 - 48.0 %    MCV 93.7 80.0 - 99.9 fL    MCH 31.4 26.0 - 35.0 pg    MCHC 33.5 32.0 - 34.5 %    RDW 12.8 11.5 - 15.0 fL    Platelets 590 789 - 195 E9/L    MPV 9.7 7.0 - 12.0 fL    Neutrophils % 76.5 43.0 - 80.0 %    Immature Granulocytes % 0.3 0.0 - 5.0 %    Lymphocytes % 16.1 (L) 20.0 - 42.0 %    Monocytes % 5.4 2.0 - 12.0 %    Eosinophils % 1.2 0.0 - 6.0 %    Basophils % 0.5 0.0 - 2.0 %    Neutrophils Absolute 4.50 1.80 - 7.30 E9/L    Immature Granulocytes # 0.02 E9/L    Lymphocytes Absolute 0.95 (L) 1.50 - 4.00 E9/L    Monocytes Absolute 0.32 0.10 - 0.95 E9/L    Eosinophils Absolute 0.07 0.05 - 0.50 E9/L    Basophils Absolute 0.03 0.00 - 0.20 E9/L   Comprehensive Metabolic Panel w/ Reflex to MG   Result Value Ref Range    Sodium 131 (L) 132 - 146 mmol/L    Potassium reflex Magnesium 4.0 3.5 - 5.0 mmol/L    Chloride 95 (L) 98 - 107 mmol/L    CO2 22 22 - 29 mmol/L    Anion Gap 14 7 - 16 mmol/L    Glucose 118 (H) 74 - 99 mg/dL    BUN 13 8 - 23 mg/dL    CREATININE 0.8 0.5 - 1.0 mg/dL    GFR Non-African American >60 >=60 mL/min/1.73    GFR African American >60     Calcium 9.9 8.6 - 10.2 mg/dL    Total Protein 7.4 6.4 - 8.3 g/dL    Alb 4.6 3.5 - 5.2 g/dL    Total Bilirubin 0.6 0.0 - 1.2 mg/dL    Alkaline Phosphatase 104 35 - 104 U/L    ALT 11 0 - 32 U/L    AST 20 0 - 31 U/L   Lactic Acid, Plasma   Result Value Ref Range    Lactic Acid 1.2 0.5 - 2.2 mmol/L   Lipase   Result Value Ref Range    Lipase 17 13 - 60 U/L   Troponin   Result Value Ref Range    Troponin <0.01 0.00 - 0.03 ng/mL   Urinalysis with Microscopic   Result Value Ref Range    Color, discussed at length with them reasons for immediate return here for re evaluation. They will followup with their primary care physician by calling their office tomorrow. --------------------------------- ADDITIONAL PROVIDER NOTES ---------------------------------  At this time the patient is without objective evidence of an acute process requiring hospitalization or inpatient management. They have remained hemodynamically stable throughout their entire ED visit and are stable for discharge with outpatient follow-up. The plan has been discussed in detail and they are aware of the specific conditions for emergent return, as well as the importance of follow-up. Discharge Medication List as of 12/29/2020 12:28 PM      START taking these medications    Details   senna (SENOKOT) 8.6 MG TABS tablet Take 1 tablet by mouth daily, Disp-30 tablet, R-0Normal      docusate sodium (COLACE) 100 MG capsule Take 1 capsule by mouth 2 times daily, Disp-60 capsule, R-0Normal             Diagnosis:  1. Constipation, unspecified constipation type    2. Compression fracture of thoracic vertebra, initial encounter, unspecified thoracic vertebral level (HCC)        Disposition:  Patient's disposition: Discharge to home  Patient's condition is stable.             Davina Mortimer, MD  Resident  12/30/20 4883

## 2020-12-29 NOTE — ED NOTES
Shayla daughter notified of discharge, instructed to bring patient some clean cloths.       Sanford Grant RN  12/29/20 8260

## 2021-05-04 ENCOUNTER — APPOINTMENT (OUTPATIENT)
Dept: CT IMAGING | Age: 86
End: 2021-05-04
Payer: MEDICARE

## 2021-05-04 ENCOUNTER — HOSPITAL ENCOUNTER (EMERGENCY)
Age: 86
Discharge: HOME | End: 2021-05-04
Attending: EMERGENCY MEDICINE
Payer: MEDICARE

## 2021-05-04 VITALS
HEART RATE: 70 BPM | SYSTOLIC BLOOD PRESSURE: 127 MMHG | RESPIRATION RATE: 16 BRPM | WEIGHT: 116 LBS | OXYGEN SATURATION: 96 % | BODY MASS INDEX: 21.9 KG/M2 | DIASTOLIC BLOOD PRESSURE: 64 MMHG | TEMPERATURE: 97.2 F | HEIGHT: 61 IN

## 2021-05-04 DIAGNOSIS — R51.9 ACUTE NONINTRACTABLE HEADACHE, UNSPECIFIED HEADACHE TYPE: Primary | ICD-10-CM

## 2021-05-04 LAB
ALBUMIN SERPL-MCNC: 4.9 G/DL (ref 3.5–5.2)
ALP BLD-CCNC: 105 U/L (ref 35–104)
ALT SERPL-CCNC: 8 U/L (ref 0–32)
ANION GAP SERPL CALCULATED.3IONS-SCNC: 11 MMOL/L (ref 7–16)
AST SERPL-CCNC: 15 U/L (ref 0–31)
BASOPHILS ABSOLUTE: 0.05 E9/L (ref 0–0.2)
BASOPHILS RELATIVE PERCENT: 0.9 % (ref 0–2)
BILIRUB SERPL-MCNC: 0.3 MG/DL (ref 0–1.2)
BUN BLDV-MCNC: 12 MG/DL (ref 6–23)
CALCIUM SERPL-MCNC: 9.8 MG/DL (ref 8.6–10.2)
CHLORIDE BLD-SCNC: 98 MMOL/L (ref 98–107)
CO2: 25 MMOL/L (ref 22–29)
CREAT SERPL-MCNC: 0.7 MG/DL (ref 0.5–1)
EKG ATRIAL RATE: 82 BPM
EKG P AXIS: 56 DEGREES
EKG P-R INTERVAL: 170 MS
EKG Q-T INTERVAL: 374 MS
EKG QRS DURATION: 78 MS
EKG QTC CALCULATION (BAZETT): 436 MS
EKG R AXIS: -15 DEGREES
EKG T AXIS: 34 DEGREES
EKG VENTRICULAR RATE: 82 BPM
EOSINOPHILS ABSOLUTE: 0.06 E9/L (ref 0.05–0.5)
EOSINOPHILS RELATIVE PERCENT: 1.1 % (ref 0–6)
GFR AFRICAN AMERICAN: >60
GFR NON-AFRICAN AMERICAN: >60 ML/MIN/1.73
GLUCOSE BLD-MCNC: 118 MG/DL (ref 74–99)
HCT VFR BLD CALC: 42.8 % (ref 34–48)
HEMOGLOBIN: 14.1 G/DL (ref 11.5–15.5)
IMMATURE GRANULOCYTES #: 0.02 E9/L
IMMATURE GRANULOCYTES %: 0.4 % (ref 0–5)
LYMPHOCYTES ABSOLUTE: 1.38 E9/L (ref 1.5–4)
LYMPHOCYTES RELATIVE PERCENT: 25.6 % (ref 20–42)
MCH RBC QN AUTO: 30.7 PG (ref 26–35)
MCHC RBC AUTO-ENTMCNC: 32.9 % (ref 32–34.5)
MCV RBC AUTO: 93.2 FL (ref 80–99.9)
MONOCYTES ABSOLUTE: 0.26 E9/L (ref 0.1–0.95)
MONOCYTES RELATIVE PERCENT: 4.8 % (ref 2–12)
NEUTROPHILS ABSOLUTE: 3.63 E9/L (ref 1.8–7.3)
NEUTROPHILS RELATIVE PERCENT: 67.2 % (ref 43–80)
PDW BLD-RTO: 12.1 FL (ref 11.5–15)
PLATELET # BLD: 228 E9/L (ref 130–450)
PMV BLD AUTO: 9.8 FL (ref 7–12)
POTASSIUM REFLEX MAGNESIUM: 3.9 MMOL/L (ref 3.5–5)
RBC # BLD: 4.59 E12/L (ref 3.5–5.5)
SODIUM BLD-SCNC: 134 MMOL/L (ref 132–146)
TOTAL PROTEIN: 7.6 G/DL (ref 6.4–8.3)
WBC # BLD: 5.4 E9/L (ref 4.5–11.5)

## 2021-05-04 PROCEDURE — 80053 COMPREHEN METABOLIC PANEL: CPT

## 2021-05-04 PROCEDURE — 96375 TX/PRO/DX INJ NEW DRUG ADDON: CPT

## 2021-05-04 PROCEDURE — 85025 COMPLETE CBC W/AUTO DIFF WBC: CPT

## 2021-05-04 PROCEDURE — 93005 ELECTROCARDIOGRAM TRACING: CPT | Performed by: EMERGENCY MEDICINE

## 2021-05-04 PROCEDURE — 6360000004 HC RX CONTRAST MEDICATION: Performed by: RADIOLOGY

## 2021-05-04 PROCEDURE — 99284 EMERGENCY DEPT VISIT MOD MDM: CPT

## 2021-05-04 PROCEDURE — 70496 CT ANGIOGRAPHY HEAD: CPT

## 2021-05-04 PROCEDURE — 70450 CT HEAD/BRAIN W/O DYE: CPT

## 2021-05-04 PROCEDURE — 70498 CT ANGIOGRAPHY NECK: CPT

## 2021-05-04 PROCEDURE — 93010 ELECTROCARDIOGRAM REPORT: CPT | Performed by: INTERNAL MEDICINE

## 2021-05-04 PROCEDURE — 6360000002 HC RX W HCPCS: Performed by: EMERGENCY MEDICINE

## 2021-05-04 PROCEDURE — 96374 THER/PROPH/DIAG INJ IV PUSH: CPT

## 2021-05-04 RX ORDER — MORPHINE SULFATE 2 MG/ML
2 INJECTION, SOLUTION INTRAMUSCULAR; INTRAVENOUS ONCE
Status: COMPLETED | OUTPATIENT
Start: 2021-05-04 | End: 2021-05-04

## 2021-05-04 RX ORDER — DIPHENHYDRAMINE HYDROCHLORIDE 50 MG/ML
25 INJECTION INTRAMUSCULAR; INTRAVENOUS ONCE
Status: COMPLETED | OUTPATIENT
Start: 2021-05-04 | End: 2021-05-04

## 2021-05-04 RX ORDER — PROCHLORPERAZINE EDISYLATE 5 MG/ML
10 INJECTION INTRAMUSCULAR; INTRAVENOUS ONCE
Status: COMPLETED | OUTPATIENT
Start: 2021-05-04 | End: 2021-05-04

## 2021-05-04 RX ADMIN — IOPAMIDOL 75 ML: 755 INJECTION, SOLUTION INTRAVENOUS at 18:55

## 2021-05-04 RX ADMIN — MORPHINE SULFATE 2 MG: 2 INJECTION, SOLUTION INTRAMUSCULAR; INTRAVENOUS at 18:18

## 2021-05-04 RX ADMIN — PROCHLORPERAZINE EDISYLATE 10 MG: 5 INJECTION INTRAMUSCULAR; INTRAVENOUS at 17:36

## 2021-05-04 RX ADMIN — DIPHENHYDRAMINE HYDROCHLORIDE 25 MG: 50 INJECTION, SOLUTION INTRAMUSCULAR; INTRAVENOUS at 17:35

## 2021-05-04 ASSESSMENT — ENCOUNTER SYMPTOMS
SHORTNESS OF BREATH: 0
EYE PAIN: 0
NAUSEA: 0
VOMITING: 0
SORE THROAT: 0
ABDOMINAL PAIN: 0
BACK PAIN: 0

## 2021-05-04 ASSESSMENT — PAIN DESCRIPTION - PAIN TYPE
TYPE: ACUTE PAIN
TYPE: ACUTE PAIN

## 2021-05-04 ASSESSMENT — PAIN DESCRIPTION - ONSET: ONSET: SUDDEN

## 2021-05-04 ASSESSMENT — PAIN SCALES - GENERAL
PAINLEVEL_OUTOF10: 4
PAINLEVEL_OUTOF10: 9

## 2021-05-04 ASSESSMENT — PAIN DESCRIPTION - FREQUENCY: FREQUENCY: CONTINUOUS

## 2021-05-04 NOTE — ED PROVIDER NOTES
Patient is a 59-year-old female presents emergency department with headache. Patient does have a long history of migraine for which she has sumatriptan prescribed to her for treatment of these migraines. Patient states that over the last few years her migraines changed and she states he now has these headache with fogginess syndromes that occur over and over again similar to this episode has happened several times in the past.  Patient states that she has not been taking the sumatriptan for this due to her thinking is not her migraine. Patient denies any fever, chills, nausea, vomiting also denies any prodromal symptoms. The history is provided by the patient. No  was used. Headache  Pain location:  Generalized  Quality:  Dull  Radiates to:  Does not radiate  Severity currently:  8/10  Severity at highest:  8/10  Onset quality:  Sudden  Timing:  Constant  Progression:  Worsening  Chronicity:  Recurrent  Similar to prior headaches: yes    Relieved by:  Nothing  Worsened by:  Nothing  Ineffective treatments:  None tried  Associated symptoms: no abdominal pain, no back pain, no ear pain, no eye pain, no fever, no nausea, no neck pain, no sore throat and no vomiting    Hypertension  Associated symptoms: headaches    Associated symptoms: no abdominal pain, no chest pain, no ear pain, no fever, no nausea, no neck pain, no shortness of breath and not vomiting         Review of Systems   Constitutional: Negative for chills and fever. HENT: Negative for ear pain and sore throat. Eyes: Negative for pain. Respiratory: Negative for shortness of breath. Cardiovascular: Negative for chest pain. Gastrointestinal: Negative for abdominal pain, nausea and vomiting. Genitourinary: Negative for flank pain and pelvic pain. Musculoskeletal: Negative for back pain and neck pain. Skin: Negative for rash. Neurological: Positive for headaches.         Physical Exam  Vitals signs and nursing negative. Patient was given migraine cocktail to assess for improvement in this did drastically improve her migraine type symptoms. Patient was counseled to use her normal outpatient migraine medication for these symptoms as well as follow-up with her PCP for further evaluation. She is also told that if she has any weakness or dysarthria or other neurologic symptoms to come emerged from be reevaluated. --------------------------------------------- PAST HISTORY ---------------------------------------------  Past Medical History:  has a past medical history of Acid reflux disease, Arthritis, Chronic back pain, COPD (chronic obstructive pulmonary disease) ([facility] [address] Hearing loss, Hyperlipidemia, Irritable bowel, [facility] ([facility] Migraine, Neuropathy, Osteoarthritis, Osteoporosis, Thyroid disease, and TIA (transient ischemic attack). Past Surgical History:  has a past surgical history that includes Hysterectomy; Tonsillectomy; Appendectomy; Thyroid surgery; Endoscopy, colon, diagnostic (8/2012); ERCP (10/31/2012); ERCP (12/05/2012); fracture surgery; eye surgery; Cholecystectomy; Fixation Kyphoplasty (08/03/2016); Breast surgery; Colonoscopy; Ankle surgery (1980); Breast biopsy (1978); pr office/outpt visit,procedure only (N/A, 8/1/2018); and blepharoplasty. Social History:  reports that she quit smoking about 25 years ago. Her smoking use included cigarettes. She started smoking about 61 years ago. She smoked 1.00 pack per day. She has never used smokeless tobacco. She reports that she does not drink alcohol or use drugs. Family History: family history includes Cancer in her brother, brother, and mother; Emphysema in her father; Heart Disease in her brother; Rheum Arthritis in her sister. The patients home medications have been reviewed.     Allergies: Tramadol, Amlodipine besylate, Bentyl [dicyclomine hcl], Codeine, Lactose intolerance (gi), Mobic [meloxicam], Monosodium glutamate, Other, Pantoprazole, Prednisone, and Talwin [pentazocine]    -------------------------------------------------- RESULTS -------------------------------------------------  Labs:  Results for orders placed or performed during the hospital encounter of 05/04/21   CBC Auto Differential   Result Value Ref Range    WBC 5.4 4.5 - 11.5 E9/L    RBC 4.59 3.50 - 5.50 E12/L    Hemoglobin 14.1 11.5 - 15.5 g/dL    Hematocrit 42.8 34.0 - 48.0 %    MCV 93.2 80.0 - 99.9 fL    MCH 30.7 26.0 - 35.0 pg    MCHC 32.9 32.0 - 34.5 %    RDW 12.1 11.5 - 15.0 fL    Platelets 167 395 - 537 E9/L    MPV 9.8 7.0 - 12.0 fL    Neutrophils % 67.2 43.0 - 80.0 %    Immature Granulocytes % 0.4 0.0 - 5.0 %    Lymphocytes % 25.6 20.0 - 42.0 %    Monocytes % 4.8 2.0 - 12.0 %    Eosinophils % 1.1 0.0 - 6.0 %    Basophils % 0.9 0.0 - 2.0 %    Neutrophils Absolute 3.63 1.80 - 7.30 E9/L    Immature Granulocytes # 0.02 E9/L    Lymphocytes Absolute 1.38 (L) 1.50 - 4.00 E9/L    Monocytes Absolute 0.26 0.10 - 0.95 E9/L    Eosinophils Absolute 0.06 0.05 - 0.50 E9/L    Basophils Absolute 0.05 0.00 - 0.20 E9/L   Comprehensive Metabolic Panel w/ Reflex to MG   Result Value Ref Range    Sodium 134 132 - 146 mmol/L    Potassium reflex Magnesium 3.9 3.5 - 5.0 mmol/L    Chloride 98 98 - 107 mmol/L    CO2 25 22 - 29 mmol/L    Anion Gap 11 7 - 16 mmol/L    Glucose 118 (H) 74 - 99 mg/dL    BUN 12 6 - 23 mg/dL    CREATININE 0.7 0.5 - 1.0 mg/dL    GFR Non-African American >60 >=60 mL/min/1.73    GFR African American >60     Calcium 9.8 8.6 - 10.2 mg/dL    Total Protein 7.6 6.4 - 8.3 g/dL    Albumin 4.9 3.5 - 5.2 g/dL    Total Bilirubin 0.3 0.0 - 1.2 mg/dL    Alkaline Phosphatase 105 (H) 35 - 104 U/L    ALT 8 0 - 32 U/L    AST 15 0 - 31 U/L   EKG 12 Lead   Result Value Ref Range    Ventricular Rate 82 BPM    Atrial Rate 82 BPM    P-R Interval 170 ms    QRS Duration 78 ms    Q-T Interval 374 ms    QTc Calculation (Bazett) 436 ms    P Axis 56 degrees    R Axis -15 degrees    T Axis 34 degrees       Radiology:  CTA HEAD W CONTRAST   Final Result   1. Unenhanced CT shows no acute intracranial hemorrhage or edema. 2. No evidence of intracranial arterial stenosis. 3.  No stenosis involving proximal or distal cervical internal carotid   arteries or vertebral arteries. CTA NECK W CONTRAST   Final Result   1. Unenhanced CT shows no acute intracranial hemorrhage or edema. 2. No evidence of intracranial arterial stenosis. 3.  No stenosis involving proximal or distal cervical internal carotid   arteries or vertebral arteries. CT Head WO Contrast   Final Result   No acute intracranial abnormality. Periventricular white matter changes consistent chronic microvascular   disease. Diffuse volume loss.             ------------------------- NURSING NOTES AND VITALS REVIEWED ---------------------------  Date / Time Roomed:  5/4/2021  3:28 PM  ED Bed Assignment:  02/02    The nursing notes within the ED encounter and vital signs as below have been reviewed. /64   Pulse 70   Temp 97.2 °F (36.2 °C) (Temporal)   Resp 16   Ht 5' 1\" (1.549 m)   Wt 116 lb (52.6 kg)   SpO2 96%   BMI 21.92 kg/m²   Oxygen Saturation Interpretation: Normal      ------------------------------------------ PROGRESS NOTES ------------------------------------------  10:37 AM EDT  I have spoken with the Patient and/or Family and discussed todays results, in addition to providing specific details for the plan of care and counseling regarding the diagnosis and prognosis. Labs and Imaging discussed with patient including appropriate follow- up and re-evaluation. Their questions are answered at this time and they are agreeable with the plan. I discussed at length with them reasons for immediate return here for re evaluation.  They will followup with PCP by calling their office Next Business Day      --------------------------------- ADDITIONAL PROVIDER NOTES

## 2021-05-04 NOTE — ED NOTES
Bed: 02  Expected date:   Expected time:   Means of arrival:   Comments:  Pat Vargas RN  05/04/21 1529

## 2021-05-13 NOTE — PROGRESS NOTES
Pharmacy Note    Eugenia Choudhary was ordered Inulin (Fiberchoice). As per the [address] [place] [address], herbals and certain dietary supplements will be discontinued.   The herbal or dietary supplement may be continued after discharge from the hospital.
diarrhea/abdominal pain

## 2022-02-06 ENCOUNTER — APPOINTMENT (OUTPATIENT)
Dept: CT IMAGING | Age: 87
DRG: 062 | End: 2022-02-06
Payer: MEDICARE

## 2022-02-06 ENCOUNTER — APPOINTMENT (OUTPATIENT)
Dept: GENERAL RADIOLOGY | Age: 87
DRG: 062 | End: 2022-02-06
Payer: MEDICARE

## 2022-02-06 ENCOUNTER — HOSPITAL ENCOUNTER (INPATIENT)
Age: 87
LOS: 4 days | Discharge: HOME | DRG: 062 | End: 2022-02-10
Attending: EMERGENCY MEDICINE | Admitting: INTERNAL MEDICINE
Payer: MEDICARE

## 2022-02-06 DIAGNOSIS — I63.9 ACUTE ISCHEMIC STROKE (HCC): Primary | ICD-10-CM

## 2022-02-06 DIAGNOSIS — I10 UNCONTROLLED HYPERTENSION: ICD-10-CM

## 2022-02-06 PROBLEM — K21.9 GERD (GASTROESOPHAGEAL REFLUX DISEASE): Chronic | Status: ACTIVE | Noted: 2020-01-05

## 2022-02-06 LAB
ALBUMIN SERPL-MCNC: 4.7 G/DL (ref 3.5–5.2)
ALP BLD-CCNC: 92 U/L (ref 35–104)
ALT SERPL-CCNC: 9 U/L (ref 0–32)
ANION GAP SERPL CALCULATED.3IONS-SCNC: 13 MMOL/L (ref 7–16)
APTT: 32.1 SEC (ref 24.5–35.1)
AST SERPL-CCNC: 14 U/L (ref 0–31)
BACTERIA: NORMAL /HPF
BASOPHILS ABSOLUTE: 0.03 E9/L (ref 0–0.2)
BASOPHILS RELATIVE PERCENT: 0.6 % (ref 0–2)
BILIRUB SERPL-MCNC: 0.4 MG/DL (ref 0–1.2)
BILIRUBIN URINE: NEGATIVE
BLOOD, URINE: NORMAL
BUN BLDV-MCNC: 14 MG/DL (ref 6–23)
CALCIUM SERPL-MCNC: 9.9 MG/DL (ref 8.6–10.2)
CHLORIDE BLD-SCNC: 102 MMOL/L (ref 98–107)
CLARITY: CLEAR
CO2: 23 MMOL/L (ref 22–29)
COLOR: NORMAL
CREAT SERPL-MCNC: 0.9 MG/DL (ref 0.5–1)
EOSINOPHILS ABSOLUTE: 0.08 E9/L (ref 0.05–0.5)
EOSINOPHILS RELATIVE PERCENT: 1.6 % (ref 0–6)
GFR AFRICAN AMERICAN: >60
GFR NON-AFRICAN AMERICAN: 58 ML/MIN/1.73
GLUCOSE BLD-MCNC: 100 MG/DL (ref 74–99)
GLUCOSE URINE: NEGATIVE MG/DL
HCT VFR BLD CALC: 39.7 % (ref 34–48)
HEMOGLOBIN: 13 G/DL (ref 11.5–15.5)
IMMATURE GRANULOCYTES #: 0.01 E9/L
IMMATURE GRANULOCYTES %: 0.2 % (ref 0–5)
INR BLD: 1.1
KETONES, URINE: NEGATIVE MG/DL
LEUKOCYTE ESTERASE, URINE: NEGATIVE
LYMPHOCYTES ABSOLUTE: 1.62 E9/L (ref 1.5–4)
LYMPHOCYTES RELATIVE PERCENT: 31.5 % (ref 20–42)
MCH RBC QN AUTO: 31.6 PG (ref 26–35)
MCHC RBC AUTO-ENTMCNC: 32.7 % (ref 32–34.5)
MCV RBC AUTO: 96.6 FL (ref 80–99.9)
METER GLUCOSE: 88 MG/DL (ref 74–99)
MONOCYTES ABSOLUTE: 0.3 E9/L (ref 0.1–0.95)
MONOCYTES RELATIVE PERCENT: 5.8 % (ref 2–12)
NEUTROPHILS ABSOLUTE: 3.1 E9/L (ref 1.8–7.3)
NEUTROPHILS RELATIVE PERCENT: 60.3 % (ref 43–80)
NITRITE, URINE: NEGATIVE
PDW BLD-RTO: 12.7 FL (ref 11.5–15)
PH UA: 7.5 (ref 5–9)
PLATELET # BLD: 215 E9/L (ref 130–450)
PMV BLD AUTO: 9.3 FL (ref 7–12)
POTASSIUM SERPL-SCNC: 5.2 MMOL/L (ref 3.5–5)
PROTEIN UA: NEGATIVE MG/DL
PROTHROMBIN TIME: 11.8 SEC (ref 9.3–12.4)
RBC # BLD: 4.11 E12/L (ref 3.5–5.5)
RBC UA: NORMAL /HPF (ref 0–2)
SARS-COV-2, NAAT: NOT DETECTED
SODIUM BLD-SCNC: 138 MMOL/L (ref 132–146)
SPECIFIC GRAVITY UA: 1.01 (ref 1–1.03)
TOTAL PROTEIN: 7.4 G/DL (ref 6.4–8.3)
TROPONIN, HIGH SENSITIVITY: 12 NG/L (ref 0–9)
UROBILINOGEN, URINE: 0.2 E.U./DL
WBC # BLD: 5.1 E9/L (ref 4.5–11.5)
WBC UA: NORMAL /HPF (ref 0–5)

## 2022-02-06 PROCEDURE — 70496 CT ANGIOGRAPHY HEAD: CPT | Performed by: RADIOLOGY

## 2022-02-06 PROCEDURE — 82962 GLUCOSE BLOOD TEST: CPT

## 2022-02-06 PROCEDURE — 0042T CT BRAIN PERFUSION: CPT

## 2022-02-06 PROCEDURE — 84484 ASSAY OF TROPONIN QUANT: CPT

## 2022-02-06 PROCEDURE — 6360000002 HC RX W HCPCS: Performed by: INTERNAL MEDICINE

## 2022-02-06 PROCEDURE — 0042T CT BRAIN PERFUSION: CPT | Performed by: RADIOLOGY

## 2022-02-06 PROCEDURE — 2000000000 HC ICU R&B

## 2022-02-06 PROCEDURE — 70498 CT ANGIOGRAPHY NECK: CPT | Performed by: RADIOLOGY

## 2022-02-06 PROCEDURE — 99283 EMERGENCY DEPT VISIT LOW MDM: CPT

## 2022-02-06 PROCEDURE — 4A03X5D MEASUREMENT OF ARTERIAL FLOW, INTRACRANIAL, EXTERNAL APPROACH: ICD-10-PCS | Performed by: EMERGENCY MEDICINE

## 2022-02-06 PROCEDURE — 71045 X-RAY EXAM CHEST 1 VIEW: CPT

## 2022-02-06 PROCEDURE — 85610 PROTHROMBIN TIME: CPT

## 2022-02-06 PROCEDURE — 6360000002 HC RX W HCPCS: Performed by: EMERGENCY MEDICINE

## 2022-02-06 PROCEDURE — 85025 COMPLETE CBC W/AUTO DIFF WBC: CPT

## 2022-02-06 PROCEDURE — 2580000003 HC RX 258: Performed by: EMERGENCY MEDICINE

## 2022-02-06 PROCEDURE — 2580000003 HC RX 258: Performed by: INTERNAL MEDICINE

## 2022-02-06 PROCEDURE — 93005 ELECTROCARDIOGRAM TRACING: CPT | Performed by: EMERGENCY MEDICINE

## 2022-02-06 PROCEDURE — 85730 THROMBOPLASTIN TIME PARTIAL: CPT

## 2022-02-06 PROCEDURE — 81001 URINALYSIS AUTO W/SCOPE: CPT

## 2022-02-06 PROCEDURE — 70496 CT ANGIOGRAPHY HEAD: CPT

## 2022-02-06 PROCEDURE — 6360000004 HC RX CONTRAST MEDICATION: Performed by: RADIOLOGY

## 2022-02-06 PROCEDURE — 94664 DEMO&/EVAL PT USE INHALER: CPT

## 2022-02-06 PROCEDURE — 99448 NTRPROF PH1/NTRNET/EHR 21-30: CPT | Performed by: PSYCHIATRY & NEUROLOGY

## 2022-02-06 PROCEDURE — 36415 COLL VENOUS BLD VENIPUNCTURE: CPT

## 2022-02-06 PROCEDURE — 80053 COMPREHEN METABOLIC PANEL: CPT

## 2022-02-06 PROCEDURE — 70450 CT HEAD/BRAIN W/O DYE: CPT

## 2022-02-06 PROCEDURE — 70450 CT HEAD/BRAIN W/O DYE: CPT | Performed by: RADIOLOGY

## 2022-02-06 PROCEDURE — 96365 THER/PROPH/DIAG IV INF INIT: CPT

## 2022-02-06 PROCEDURE — 6370000000 HC RX 637 (ALT 250 FOR IP): Performed by: INTERNAL MEDICINE

## 2022-02-06 PROCEDURE — 2500000003 HC RX 250 WO HCPCS: Performed by: EMERGENCY MEDICINE

## 2022-02-06 PROCEDURE — 96374 THER/PROPH/DIAG INJ IV PUSH: CPT

## 2022-02-06 PROCEDURE — 3E03317 INTRODUCTION OF OTHER THROMBOLYTIC INTO PERIPHERAL VEIN, PERCUTANEOUS APPROACH: ICD-10-PCS | Performed by: EMERGENCY MEDICINE

## 2022-02-06 PROCEDURE — 70498 CT ANGIOGRAPHY NECK: CPT

## 2022-02-06 PROCEDURE — 2580000003 HC RX 258: Performed by: RADIOLOGY

## 2022-02-06 PROCEDURE — 87635 SARS-COV-2 COVID-19 AMP PRB: CPT

## 2022-02-06 RX ORDER — ONDANSETRON 2 MG/ML
4 INJECTION INTRAMUSCULAR; INTRAVENOUS EVERY 6 HOURS PRN
Status: DISCONTINUED | OUTPATIENT
Start: 2022-02-06 | End: 2022-02-10 | Stop reason: HOSPADM

## 2022-02-06 RX ORDER — 0.9 % SODIUM CHLORIDE 0.9 %
50 INTRAVENOUS SOLUTION INTRAVENOUS ONCE
Status: COMPLETED | OUTPATIENT
Start: 2022-02-06 | End: 2022-02-06

## 2022-02-06 RX ORDER — LEVOTHYROXINE SODIUM 0.05 MG/1
50 TABLET ORAL
Status: DISCONTINUED | OUTPATIENT
Start: 2022-02-07 | End: 2022-02-10 | Stop reason: HOSPADM

## 2022-02-06 RX ORDER — SODIUM CHLORIDE 0.9 % (FLUSH) 0.9 %
5-40 SYRINGE (ML) INJECTION EVERY 12 HOURS SCHEDULED
Status: DISCONTINUED | OUTPATIENT
Start: 2022-02-06 | End: 2022-02-10 | Stop reason: HOSPADM

## 2022-02-06 RX ORDER — ALBUTEROL SULFATE 2.5 MG/3ML
2.5 SOLUTION RESPIRATORY (INHALATION) DAILY PRN
Status: DISCONTINUED | OUTPATIENT
Start: 2022-02-06 | End: 2022-02-10 | Stop reason: HOSPADM

## 2022-02-06 RX ORDER — DEXTROSE MONOHYDRATE 25 G/50ML
12.5 INJECTION, SOLUTION INTRAVENOUS
Status: ACTIVE | OUTPATIENT
Start: 2022-02-06 | End: 2022-02-06

## 2022-02-06 RX ORDER — LABETALOL HYDROCHLORIDE 5 MG/ML
10 INJECTION, SOLUTION INTRAVENOUS ONCE
Status: COMPLETED | OUTPATIENT
Start: 2022-02-06 | End: 2022-02-06

## 2022-02-06 RX ORDER — DEXLANSOPRAZOLE 60 MG/1
60 CAPSULE, DELAYED RELEASE ORAL DAILY
Status: DISCONTINUED | OUTPATIENT
Start: 2022-02-06 | End: 2022-02-10 | Stop reason: HOSPADM

## 2022-02-06 RX ORDER — ACETAMINOPHEN 650 MG/1
650 SUPPOSITORY RECTAL EVERY 6 HOURS PRN
Status: DISCONTINUED | OUTPATIENT
Start: 2022-02-06 | End: 2022-02-10 | Stop reason: HOSPADM

## 2022-02-06 RX ORDER — ONDANSETRON 4 MG/1
4 TABLET, ORALLY DISINTEGRATING ORAL EVERY 8 HOURS PRN
Status: DISCONTINUED | OUTPATIENT
Start: 2022-02-06 | End: 2022-02-10 | Stop reason: HOSPADM

## 2022-02-06 RX ORDER — BUDESONIDE 0.25 MG/2ML
250 INHALANT ORAL 2 TIMES DAILY
Status: DISCONTINUED | OUTPATIENT
Start: 2022-02-06 | End: 2022-02-10 | Stop reason: HOSPADM

## 2022-02-06 RX ORDER — SODIUM CHLORIDE 9 MG/ML
25 INJECTION, SOLUTION INTRAVENOUS PRN
Status: DISCONTINUED | OUTPATIENT
Start: 2022-02-06 | End: 2022-02-10 | Stop reason: HOSPADM

## 2022-02-06 RX ORDER — ACETAMINOPHEN 325 MG/1
650 TABLET ORAL EVERY 6 HOURS PRN
Status: DISCONTINUED | OUTPATIENT
Start: 2022-02-06 | End: 2022-02-10 | Stop reason: HOSPADM

## 2022-02-06 RX ORDER — SODIUM CHLORIDE 0.9 % (FLUSH) 0.9 %
10 SYRINGE (ML) INJECTION
Status: COMPLETED | OUTPATIENT
Start: 2022-02-06 | End: 2022-02-06

## 2022-02-06 RX ORDER — SODIUM CHLORIDE 0.9 % (FLUSH) 0.9 %
5-40 SYRINGE (ML) INJECTION PRN
Status: DISCONTINUED | OUTPATIENT
Start: 2022-02-06 | End: 2022-02-10 | Stop reason: HOSPADM

## 2022-02-06 RX ORDER — POLYETHYLENE GLYCOL 3350 17 G/17G
17 POWDER, FOR SOLUTION ORAL DAILY PRN
Status: DISCONTINUED | OUTPATIENT
Start: 2022-02-06 | End: 2022-02-10 | Stop reason: HOSPADM

## 2022-02-06 RX ADMIN — LABETALOL HYDROCHLORIDE 10 MG: 5 INJECTION INTRAVENOUS at 11:15

## 2022-02-06 RX ADMIN — IOPAMIDOL 100 ML: 755 INJECTION, SOLUTION INTRAVENOUS at 10:59

## 2022-02-06 RX ADMIN — ACETAMINOPHEN 650 MG: 325 TABLET ORAL at 19:44

## 2022-02-06 RX ADMIN — NICARDIPINE HYDROCHLORIDE 3 MG/HR: 2.5 INJECTION, SOLUTION INTRAVENOUS at 12:57

## 2022-02-06 RX ADMIN — SODIUM CHLORIDE 50 ML: 9 INJECTION, SOLUTION INTRAVENOUS at 12:59

## 2022-02-06 RX ADMIN — BUDESONIDE 250 MCG: 0.25 SUSPENSION RESPIRATORY (INHALATION) at 20:04

## 2022-02-06 RX ADMIN — ALTEPLASE 4.7 MG: KIT at 11:23

## 2022-02-06 RX ADMIN — Medication 10 ML: at 10:59

## 2022-02-06 RX ADMIN — Medication 10 ML: at 21:02

## 2022-02-06 RX ADMIN — LABETALOL HYDROCHLORIDE 10 MG: 5 INJECTION INTRAVENOUS at 11:32

## 2022-02-06 RX ADMIN — ALTEPLASE 42.6 MG: KIT at 11:24

## 2022-02-06 ASSESSMENT — PAIN DESCRIPTION - PAIN TYPE: TYPE: ACUTE PAIN

## 2022-02-06 ASSESSMENT — PAIN SCALES - GENERAL
PAINLEVEL_OUTOF10: 0
PAINLEVEL_OUTOF10: 2
PAINLEVEL_OUTOF10: 0

## 2022-02-06 ASSESSMENT — PAIN DESCRIPTION - DESCRIPTORS: DESCRIPTORS: ACHING

## 2022-02-06 ASSESSMENT — PAIN DESCRIPTION - LOCATION: LOCATION: HEAD

## 2022-02-06 NOTE — PROGRESS NOTES
Niurka Broderick was ordered Dexilant which is a nonformulary medication. This medication will need to be supplied by the patient as the pharmacy does not carry this non-formulary medication. If the medication has not been administered by 1400 on the following day from the time the order was placed, a pharmacist will follow-up with the nurse of the patient to assess the capability of the patient to bring in the medication. If it is determined that the patient cannot supply the medication and it is not available to be dispensed from the pharmacy, the provider will be notified.     Gae Goltz, [facility], 2/6/2022 4:13 PM

## 2022-02-06 NOTE — ED PROVIDER NOTES
Department of Emergency Medicine   ED  Provider Note  Admit Date/RoomTime: 2022 10:49 AM  ED Room: 3820/3820-A    NAME: Epi Padron  : 1927  MRN: [id number]     Chief Complaint:  Numbness (pt states that she began experiencing blurred vision in the left eye, and left arm numbness, some improvement during triage, LKW 0930)    History of Present Illness     LAST KNOWN WELL TIME & DATE: Today at 9:30AM       Epi Padron is a 80 y.o. old female who presents to the emergency department for evaluation of strokelike symptoms. She states that she was sitting down watching TV when suddenly at 9:30 AM, she had blurred vision in her left eye as well as left arm and leg weakness and numbness. She walked to her daughters condo [place]. Her blood pressure checked and was very high and so her daughter called EMS. On arrival, the visual symptoms have resolved but she still has numbness to the left side. She has had no falls or trauma. She denies any current headache. She does not take any blood thinning medications. Code Status on file: Limited. .  ROS   Pertinent positives and negatives are stated within HPI, all other systems reviewed and are negative. Past Medical History:  has a past medical history of Acid reflux disease, Arthritis, Chronic back pain, COPD (chronic obstructive pulmonary disease) ([address] Hearing loss, Hyperlipidemia, Irritable bowel, Meningioma ([address] Migraine, Neuropathy, Osteoarthritis, Osteoporosis, Thyroid disease, and TIA (transient ischemic attack). Surgical History:  has a past surgical history that includes Hysterectomy; Tonsillectomy; Appendectomy; Thyroid surgery; Endoscopy, colon, diagnostic (2012); ERCP (10/31/2012); ERCP (2012); fracture surgery; eye surgery; Cholecystectomy; Fixation Kyphoplasty (2016); Breast surgery; Colonoscopy; Ankle surgery ();  Breast biopsy (); pr office/outpt visit,procedure only (N/A, 2018); and blepharoplasty. Social History:  reports that she quit smoking about 26 years ago. Her smoking use included cigarettes. She started smoking about 62 years ago. She smoked 1.00 pack per day. She has never used smokeless tobacco. She reports that she does not drink alcohol and does not use drugs. Family History: family history includes Cancer in her brother, brother, and mother; Emphysema in her father; Heart Disease in her brother; Rheum Arthritis in her sister. Allergies: Tramadol, Amlodipine besylate, Bentyl [dicyclomine hcl], Codeine, Lactose intolerance (gi), Mobic [meloxicam], Monosodium glutamate, Other, Pantoprazole, Prednisone, and Talwin [pentazocine]    Physical Exam   Oxygen Saturation Interpretation: Normal.        ED Triage Vitals [02/06/22 1051]   BP Temp Temp Source Pulse Resp SpO2 Height Weight   (!) 219/99 97.6 °F (36.4 °C) Oral 96 16 97 % 5' 1\" (1.549 m) 116 lb (52.6 kg)         Constitutional:   Level of Consciousness: Awake and alert. ETOH: No.         Distress: none. Cooperativeness: cooperative. Eyes:  PERRL, EOMI, no discharge or conjunctival injection. Ears:  External ears without lesions. Throat:  Pharynx without injection, exudate, or tonsillar hypertrophy. Airway patient. Neck:  Normal ROM. Supple. Respiratory:  Clear to auscultation and breath sounds equal.  CV:  Regular rate and rhythm, normal heart sounds, without pathological murmurs, ectopy, gallops, or rubs. GI: Abdomen Soft, nontender, good bowel sounds. No firm or pulsatile mass. Back:  No costovertebral tenderness. Integument:  Normal turgor. Warm, dry, without visible rash, unless noted elsewhere. Lymphatic: no lymphadenopathy noted  Neurological:       Orientation: person, place and time. Memory:             short term impairment: No.             Long term impairment: No.       CN II-XII: V: Facial sensation:  abnormal - L.        Motor function:            Arm(s): Bilateral normal. Leg(s): Left weak. Cerebellar function:            Tremor: No.              Past-pointing: No.             Limb Ataxia: No.       Sensory function:            Arm(s): Left diminished. Leg(s): Left diminished. Face: Left diminished. NIH Stroke Scale/Score at time of initial evaluation:  1A: Level of Consciousness 0 - alert; keenly responsive   1B: Ask Month and Age 0 - answers both questions correctly   1C: Tell Patient To Open and Close Eyes, then Hand  Squeeze 0 - performs both tasks correctly   2: Test Horizontal Extraocular Movements 0 - normal   3: Test Visual Fields 0 - no visual loss   4: Test Facial Palsy 0 - normal symmetric movement   5A: Test Left Arm Motor Drift 0 - no drift, limb holds 90 (or 45) degrees for full 10 seconds   5B: Test Right Arm Motor Drift 0 - no drift, limb holds 90 (or 45) degrees for full 10 seconds   6A: Test Left Leg Motor Drift 2 - some effort against gravity; leg falls to bed by 5 seconds but has some effort against gravity   6B: Test Right Leg Motor Drift 0 - no drift; leg holds 30 degree position for full 5 seconds   7: Test Limb Ataxia   (FNF/Heel-Shin) 0 - absent   8: Test Sensation 1 - mild to moderate sensory loss; patient feels pinprick is less sharp or is dull on the affected side; there is a loss of superficial pain with pinprick but patient is aware of being touched    9: Test Language/Aphasia 0 - no aphasia, normal   10: Test Dysarthria 0 - normal   11: Test Extinction/Inattention 0 - no abnormality   Total Score: 3   2/6/22 at 9:30 AM.      Acute CVA Core Measures:      - t-PA Eligibility: IV t-PA was Administered-Total dose IV tPA is 0.9 mg/kg (maximum 90 mg). Given 10% over one minute, then remaining 90% given as infusion over 60 minutes. - The case has been discussed with Dr. Tiara Gupta, they agree this patient is eligible for t-PA.        Lab / Imaging Results   (All laboratory and radiology results have been personally reviewed by myself)  Labs:  Results for orders placed or performed during the hospital encounter of 02/06/22   COVID-19, Rapid    Specimen: Nasopharyngeal Swab   Result Value Ref Range    SARS-CoV-2, NAAT Not Detected Not Detected   CBC Auto Differential   Result Value Ref Range    WBC 5.1 4.5 - 11.5 E9/L    RBC 4.11 3.50 - 5.50 E12/L    Hemoglobin 13.0 11.5 - 15.5 g/dL    Hematocrit 39.7 34.0 - 48.0 %    MCV 96.6 80.0 - 99.9 fL    MCH 31.6 26.0 - 35.0 pg    MCHC 32.7 32.0 - 34.5 %    RDW 12.7 11.5 - 15.0 fL    Platelets 492 860 - 784 E9/L    MPV 9.3 7.0 - 12.0 fL    Neutrophils % 60.3 43.0 - 80.0 %    Immature Granulocytes % 0.2 0.0 - 5.0 %    Lymphocytes % 31.5 20.0 - 42.0 %    Monocytes % 5.8 2.0 - 12.0 %    Eosinophils % 1.6 0.0 - 6.0 %    Basophils % 0.6 0.0 - 2.0 %    Neutrophils Absolute 3.10 1.80 - 7.30 E9/L    Immature Granulocytes # 0.01 E9/L    Lymphocytes Absolute 1.62 1.50 - 4.00 E9/L    Monocytes Absolute 0.30 0.10 - 0.95 E9/L    Eosinophils Absolute 0.08 0.05 - 0.50 E9/L    Basophils Absolute 0.03 0.00 - 0.20 E9/L   Comprehensive Metabolic Panel   Result Value Ref Range    Sodium 138 132 - 146 mmol/L    Potassium 5.2 (H) 3.5 - 5.0 mmol/L    Chloride 102 98 - 107 mmol/L    CO2 23 22 - 29 mmol/L    Anion Gap 13 7 - 16 mmol/L    Glucose 100 (H) 74 - 99 mg/dL    BUN 14 6 - 23 mg/dL    CREATININE 0.9 0.5 - 1.0 mg/dL    GFR Non-African American 58 >=60 mL/min/1.73    GFR African American >60     Calcium 9.9 8.6 - 10.2 mg/dL    Total Protein 7.4 6.4 - 8.3 g/dL    Albumin 4.7 3.5 - 5.2 g/dL    Total Bilirubin 0.4 0.0 - 1.2 mg/dL    Alkaline Phosphatase 92 35 - 104 U/L    ALT 9 0 - 32 U/L    AST 14 0 - 31 U/L   Protime-INR   Result Value Ref Range    Protime 11.8 9.3 - 12.4 sec    INR 1.1    Troponin   Result Value Ref Range    Troponin, High Sensitivity 12 (H) 0 - 9 ng/L   APTT   Result Value Ref Range    aPTT 32.1 24.5 - 35.1 sec   Urinalysis   Result Value Ref Range    Color, UA Straw Straw/Yellow Interpreted by emergency department attending physician unless otherwise noted. 2/6/22  Time: 11:36    Rhythm: normal sinus   Rate: normal  Axis: normal  Conduction: normal  ST Segments: no acute change  T Waves: non specific changes    Clinical Impression: non-specific EKG  Comparison to Prior tracings:  Today's ECG is unchanged from previous tracings.     ED Course / Medical Decision Making     Medications   dextrose 50 % IV solution (has no administration in time range)   niCARdipine (CARDENE) 50 mg in sodium chloride 0.9 % 250 mL infusion (0 mg/hr IntraVENous Stopped 2/6/22 1416)   albuterol (PROVENTIL) nebulizer solution 2.5 mg (has no administration in time range)   budesonide (PULMICORT) nebulizer suspension 250 mcg (has no administration in time range)   dexlansoprazole (DEXILANT) delayed release capsule 60 mg (60 mg Oral Not Given 2/6/22 1938)   levothyroxine (SYNTHROID) tablet 50 mcg (has no administration in time range)   sodium chloride flush 0.9 % injection 5-40 mL (has no administration in time range)   sodium chloride flush 0.9 % injection 5-40 mL (has no administration in time range)   0.9 % sodium chloride infusion (has no administration in time range)   ondansetron (ZOFRAN-ODT) disintegrating tablet 4 mg (has no administration in time range)     Or   ondansetron (ZOFRAN) injection 4 mg (has no administration in time range)   polyethylene glycol (GLYCOLAX) packet 17 g (has no administration in time range)   acetaminophen (TYLENOL) tablet 650 mg (650 mg Oral Given 2/6/22 1944)     Or   acetaminophen (TYLENOL) suppository 650 mg ( Rectal See Alternative 2/6/22 1944)   iopamidol (ISOVUE-370) 76 % injection 100 mL (100 mLs IntraVENous Given 2/6/22 1059)   sodium chloride flush 0.9 % injection 10 mL (10 mLs IntraVENous Given 2/6/22 1059)   alteplase (ACTIVASE) injection 4.7 mg (0 mg IntraVENous Stopped 2/6/22 1139)     Followed by   alteplase (ACTIVASE) injection 42.6 mg (0 mg IntraVENous Stopped 2/6/22 1224)     Followed by   0.9 % sodium chloride bolus (0 mLs IntraVENous Stopped 2/6/22 1300)   labetalol (NORMODYNE;TRANDATE) injection 10 mg (10 mg IntraVENous Given 2/6/22 1132)   labetalol (NORMODYNE;TRANDATE) injection 10 mg (10 mg IntraVENous Given 2/6/22 1115)        Re-Evaluations:  2/6/22      Patients symptoms show no change. Consultations:             IP CONSULT TO INTERNAL MEDICINE  IP CONSULT TO NEUROLOGY  IP CONSULT TO SOCIAL WORK    Procedures:   none    MDM: Patient presents to the ED for evaluation of strokelike symptoms. Stroke alert was called for an NIH of 3. She had left-sided numbness as well as left leg weakness. Dry CT was negative. Patient and family were consented for TPA. TPA administered once blood pressure control was achieved with labetalol and Cardene. No evidence of LVO on contrasted studies. Patient admitted to neuro ICU for further management. CRITICAL CARE:   75 MINUTES. Please note that the withdrawal or failure to initiate urgent interventions for this patient would likely result in a life threatening deterioration or permanent disability. Accordingly this patient received the above mentioned time, excluding separately billable procedures. Plan of Care/Counseling:  Jaymie Lopes DO reviewed today's visit with the patient in addition to providing specific details for the plan of care and counseling regarding the diagnosis and prognosis. Questions are answered at this time and are agreeable with the plan. Assessment      1. Acute ischemic stroke ([facility]    2. Uncontrolled hypertension      This patient's ED course included: a personal history and physicial examination  This patient has remained hemodynamically stable during their ED course. Plan   Admit to Neuro ICU. Patient condition is stable.     New Medications     Current Discharge Medication List        Electronically signed by Jaymie Lopes DO   DD: 2/6/22  **This report was transcribed using voice recognition software. Every effort was made to ensure accuracy; however, inadvertent computerized transcription errors may be present.   END OF PROVIDER NOTE        Jeri Anderson DO  02/06/22 1947

## 2022-02-06 NOTE — CONSULTS
[facility]  Critical Care Consult 2/6/2022      Date of Admission: 2/6    CC: Left arm sided weakness, dysarthria     HPI: 80year old female with a PMH of , HLD, presented to the ED with left sided weakness and slurred speech. Negative CT of head and pt received TPA, admitted to ICU for further care. Patient currently complaining of a headache - but minor and improved from this am.     GCS15. PERRL. No residual weakness    Problem List:   Patient Active Problem List   Diagnosis    IBS (irritable bowel syndrome)    COPD (chronic obstructive pulmonary disease) (HCC)    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[address]    Meningioma (HCC)    Acquired hypothyroidism    Back pain    Abdominal pain    Abdominal pain, LUQ    Acute LUQ pain    Chest pain    GERD (gastroesophageal reflux disease)    Closed fracture of left proximal humerus    Acute ischemic stroke ([address]       Surgical/Interventional Procedures:   2/3: tPA 11:23    PMH:    has a past medical history of Acid reflux disease, Arthritis, Chronic back pain, COPD (chronic obstructive pulmonary disease) ([address] Hearing loss, Hyperlipidemia, Irritable bowel, Meningioma ([address] Migraine, Neuropathy, Osteoarthritis, Osteoporosis, Thyroid disease, and TIA (transient ischemic attack). PSH:    has a past surgical history that includes Hysterectomy; Tonsillectomy; Appendectomy; Thyroid surgery; Endoscopy, colon, diagnostic (8/2012); ERCP (10/31/2012); ERCP (12/05/2012); fracture surgery; eye surgery; Cholecystectomy; Fixation Kyphoplasty (08/03/2016); Breast surgery; Colonoscopy; Ankle surgery (1980); Breast biopsy (1978); pr office/outpt visit,procedure only (N/A, 8/1/2018); and blepharoplasty. Home Medications:   Prior to Admission medications    Medication Sig Start Date End Date Taking?  Authorizing Provider   ondansetron (ZOFRAN ODT) 4 MG disintegrating tablet Take 1 tablet by mouth every 8 hours as needed for Nausea or Vomiting 12/29/20 Quit date: 1996     Years since quittin.0    Smokeless tobacco: Never Used   Vaping Use    Vaping Use: Never used   Substance and Sexual Activity    Alcohol use: Never    Drug use: Never    Sexual activity: Never     Birth control/protection: Surgical     Comment: Hysterectomy   Other Topics Concern    Not on file   Social History Narrative    6 cups coffee daily     Social Determinants of Health     Financial Resource Strain:     Difficulty of Paying Living Expenses: Not on file   Food Insecurity:     Worried About Running Out of Food in the Last Year: Not on file    Jaswinder of Food in the Last Year: Not on file   Transportation Needs:     Lack of Transportation (Medical): Not on file    Lack of Transportation (Non-Medical):  Not on file   Physical Activity:     Days of Exercise per Week: Not on file    Minutes of Exercise per Session: Not on file   Stress:     Feeling of Stress : Not on file   Social Connections:     Frequency of Communication with Friends and Family: Not on file    Frequency of Social Gatherings with Friends and Family: Not on file    Attends Jew Services: Not on file    Active Member of [address] [place] [address] or Organizations: Not on file    Attends Club or Organization Meetings: Not on file    Marital Status: Not on file   Intimate Partner Violence:     Fear of Current or Ex-Partner: Not on file    Emotionally Abused: Not on file    Physically Abused: Not on file    Sexually Abused: Not on file   Housing Stability:     Unable to Pay for Housing in the Last Year: Not on file    Number of [place] in the Last Year: Not on file    Unstable Housing in the Last Year: Not on file       Family History:   Family History   Problem Relation Age of Onset    Cancer Mother         leukemia    Emphysema Father     Rheum Arthritis Sister     Cancer Brother     Cancer Brother     Heart Disease Brother        VITAL SIGNS:   BP (!) 156/116   Pulse 77   Temp 97.6 °F (36.4 °C) (Oral)   Resp 14   Ht 5' 1\" (1.549 m)   Wt 116 lb (52.6 kg)   SpO2 98%   BMI 21.92 kg/m²     PHYSICAL EXAM:    General appearance:  Comfortable. Pain Description: none  GCS:    4 - Opens eyes on own   6 - Follows simple motor commands  5 - Alert and oriented    Pupil size:  Left 3 mm    Right 3 mm  Pupil reaction: Yes  Wiggles fingers: Left Yes Right Yes  Hand grasp:   Left normal    Right normal  Wiggles toes: Left Yes    Right Yes  Plantar flexion: Left normal   Right normal    CONSTITUTIONAL: no acute distress, lying in hospital bed. NEUROLOGIC: PERRL, oriented x 4  CARDIOVASCULAR: S1 S2, regular rate, regular rhythm, no murmur/gallop/rub. Monitor:NSR  PULMONARY: no rhonchi/rales/wheezes, no use of accessory muscles  RENAL: green to gravity, clear yellow urine  ABDOMEN: soft, nontender, nondistended, nontympanic, no masses, no organomegaly, normal bowel sounds   MUSCULOSKELETAL: moves all extremities purposefully, 5/5 strength   SKIN/EXTREMITIES: no rashes/ecchymosis, no edema/clubbing, warm/dry, good capillary refill     Assessment & Plan:       · Neuro:  Suspected ischemic stroke s/p tPA. Monitor neuro status, Neurology following, stroke protocol  · CV: No acute issues. Monitor hemodynamics. BP goal < 140. PRN hydralazine & labetalol. 2Decho. · Pulm: No acute issues  Monitor RR & SpO2. Pulmonary hygiene  · GI: No acute issues. Gastric tube. NPO. Tube feeds. Diet. Monitor bowel function. · Renal:  No acute issues. Monitor BUN & Cr. Monitor electrolytes & replace as needed. Monitor urine output. · ID: No acute issues   · Endocrine: No acute issues. Monitor BS. · MSK: No acute issues. ROM. turn & reposition. PT/OT  · Heme: No acute issues. Monitor CBC. Bowel regime: Glycolax   Pain control/Sedation: Tylenol  DVT prophylaxis: SCDs.   No Lovenox/heparin until 24 hours post tpa  GI prophylaxis:   Mouth/Eye care: as needed  Green: Keep in place for critical care monitoring of fluid balance. Family update:  At bedside   Code status:   Limited     Disposition:  ICU      Electronically signed by Sheba Sandoval MD on 2/6/2022 at 4:54 PM

## 2022-02-06 NOTE — ED NOTES
Bed: 19  Expected date: 2/6/22  Expected time: 10:39 AM  Means of arrival:   Comments:  yuliya Kenney RN  02/06/22 3249

## 2022-02-06 NOTE — VIRTUAL HEALTH
Consults  Patient Location:  [address] [place] [address] Emergency Department    Provider Location (City/State): [facility] Stroke and Vascular Neurology Consult for  [address] Stroke Alert through [address] @ 11:02  2/6/2022 11:13 AM  Pt Name: Criselda Tafoya  MRN: [id number]  YOB: 1927  Date of evaluation: 2/6/2022  Primary Care Physician: Jessica Galvin MD  Reason for Evaluation: Stroke evaluation with Phone Consult, Discussion and Review of imaging    Criselda Tafoya is a 80 y.o. female with pmh significant for COPD, dyslipidemia, hypothyroidism presented to [facility] with acute onset left arm and leg numbness and weakness and slurred speech. Patient was apparently at her usual state of health earlier this morning. At around 9:30 AM patient had acute onset of left-sided weakness. Patient's initial blood pressure was elevated systolic blood pressure in 200s. Reportedly NIH stroke scale in ER was 3 for left upper and lower extremity drift and sensory deficit. LKW: 09:30 am  NIH:  3    Allergies  is allergic to tramadol, amlodipine besylate, bentyl [dicyclomine hcl], codeine, lactose intolerance (gi), mobic [meloxicam], monosodium glutamate, other, pantoprazole, prednisone, and talwin [pentazocine]. Medications  Prior to Admission medications    Medication Sig Start Date End Date Taking?  Authorizing Provider   ondansetron (ZOFRAN ODT) 4 MG disintegrating tablet Take 1 tablet by mouth every 8 hours as needed for Nausea or Vomiting 12/29/20   Racquel Avina,    albuterol sulfate  (90 Base) MCG/ACT inhaler Inhale 2 puffs into the lungs daily as needed 9/4/20   Historical Provider, MD   SUMAtriptan (IMITREX) 100 MG tablet Take 100 mg by mouth daily 8/27/20   Historical Provider, MD   ferrous sulfate (IRON 325) 325 (65 Fe) MG tablet Take 1 tablet by mouth daily (with breakfast) 3/11/20 4/10/20  Pia Ellison MD   celecoxib (CELEBREX) 200 MG capsule Take 200 mg by mouth daily     Historical Provider, MD   Cholecalciferol (VITAMIN D3) 2000 units CAPS Take 2,000 Units by mouth daily     Historical Provider, MD   aspirin 81 MG EC tablet Take 1 tablet by mouth daily 17   Monie Bond DO   acetaminophen (TYLENOL) 325 MG tablet Take 650 mg by mouth every 6 hours as needed for Pain    Historical Provider, MD   dexlansoprazole (DEXILANT) 60 MG CPDR capsule Take 60 mg by mouth daily Instructed to take am of procedure    Historical Provider, MD   budesonide (PULMICORT) 180 MCG/ACT AEPB inhaler Inhale 2 puffs into the lungs daily Use am of 10/31/16    Historical Provider, MD   levothyroxine (SYNTHROID) 50 MCG tablet Take 50 mcg by mouth Six times weekly None on     Historical Provider, MD    Scheduled Meds:  Continuous Infusions:  PRN Meds:.  Past Medical History   has a past medical history of Acid reflux disease, Arthritis, Chronic back pain, COPD (chronic obstructive pulmonary disease) ([facility] Hearing loss, Hyperlipidemia, Irritable bowel, Meningioma ([facility] [address] Migraine, Neuropathy, Osteoarthritis, Osteoporosis, Thyroid disease, and TIA (transient ischemic attack). Social History  Social History     Socioeconomic History    Marital status:       Spouse name: Not on file    Number of children: Not on file    Years of education: Not on file    Highest education level: Not on file   Occupational History    Not on file   Tobacco Use    Smoking status: Former Smoker     Packs/day: 1.00     Types: Cigarettes     Start date: 1959     Quit date: 1996     Years since quittin.0    Smokeless tobacco: Never Used   Vaping Use    Vaping Use: Never used   Substance and Sexual Activity    Alcohol use: Never    Drug use: Never    Sexual activity: Never     Birth control/protection: Surgical     Comment: Hysterectomy   Other Topics Concern    Not on file Social History Narrative    6 cups coffee daily     Social Determinants of Health     Financial Resource Strain:     Difficulty of Paying Living Expenses: Not on file   Food Insecurity:     Worried About Running Out of Food in the Last Year: Not on file    Jaswinder of Food in the Last Year: Not on file   Transportation Needs:     Lack of Transportation (Medical): Not on file    Lack of Transportation (Non-Medical): Not on file   Physical Activity:     Days of Exercise per Week: Not on file    Minutes of Exercise per Session: Not on file   Stress:     Feeling of Stress : Not on file   Social Connections:     Frequency of Communication with Friends and Family: Not on file    Frequency of Social Gatherings with Friends and Family: Not on file    Attends [employer] Services: Not on file    Active Member of Clubs or Organizations: Not on file    Attends Club or Organization Meetings: Not on file    Marital Status: Not on file   Intimate Partner Violence:     Fear of Current or Ex-Partner: Not on file    Emotionally Abused: Not on file    Physically Abused: Not on file    Sexually Abused: Not on file   Housing Stability:     Unable to Pay for Housing in the Last Year: Not on file    Number of [place] in the Last Year: Not on file    Unstable Housing in the Last Year: Not on file     Family History      Problem Relation Age of Onset    Cancer Mother         leukemia    Emphysema Father     Rheum Arthritis Sister     Cancer Brother     Cancer Brother     Heart Disease Brother        OBJECTIVE  BP (!) 219/99   Pulse 96   Temp 97.6 °F (36.4 °C) (Oral)   Resp 16   Ht 5' 1\" (1.549 m)   Wt 116 lb (52.6 kg)   SpO2 97%   BMI 21.92 kg/m²        Pre-Morbid mRS: 01    Imaging:  Images were personally reviewed with CIARAN COFFEY used to review images including:  CT brain without contrast: no evidence of acute hemorrhage   CTA imaging: No evidence of intracranial LVO, no perfusion deficit on CTP. Assessment  79 y/o F with pmh significant for dyslipidemia, hypothyroidism, COPD presented with acute onset left arm and leg weakness and numbness. Patient's initial NIH stroke scale in ER was 3. Patient's initial blood pressure was elevated with SBP in 200s. CT head without contrast completed, did not show any acute intracranial hemorrhage. At this time patient is a candidate for IV TPA. CT angiogram head and neck, personally reviewed, did not show any acute intracranial large vessel occlusion or perfusion deficit. DDX: ischemic stroke small vessel disease vs cardio-embolic       Recommendations:  Patient is an IV-tPA candidate:    No history of recent bleeding, no history of brain bleed/aneurysm/brain tumor, or recent surgery/trauma. IV-tPA Consent: I have informed the patient and/or family of all the associated risks including 6% of sich/death and 1-3% of angioedema, benefits, and alternatives to IV t-PA. The patient and/or family voluntarily consent to the administration of IV t-PA. --Admit the patient to an intensive care or stroke unit for monitoring. --If the patient develops severe headache, acute hypertension, nausea, or vomiting or has a worsening neurological examination, discontinue the infusion (if IV rtPA is being administered) and obtain emergent CT scan. --Measure blood pressure and perform neurological assessments every 15 minutes during and after IV rtPA infusion for 2 hours, then every 30 minutes for 6 hours, then hourly until 24 hours after IV rtPA treatment. --Increase the frequency of blood pressure measurements if systolic blood pressure is >180 mmHg or if diastolic blood pressure is >105 mmHg; administer antihypertensive medications to maintain blood pressure at or below these levels  --Delay placement of nasogastric tubes, indwelling bladder catheters, or intra-arterial pressure catheters if the patient can be safely managed without them.    --Obtain a follow-up CT or MRI scan at 24 hours after IV rtPA before starting anticoagulants or antiplatelet agents. No antiplatelet agent or SQ heparinoids for 24 hrs. --Keep HOB < 15 degrees  --Keep NPO unless passes bedside dysphagia screen or swallow evaluation. --Obtain MRI brain w/o contrast, MRA or CTA of head and neck (carotid u/s if MRA and CTA not available), transthoracic echo-to r/o structural heart disease that can lead to source of emboli, fasting lipid panel, HgbA1c.   --Smoking cessation education and nicotine patch if indicated  --IVF with NS @ 100cc per hour  --PT/OT/SLP    - If stroke, initiate antiplatelet therapy (ASA 325mg po qday or Plavix 75mg po qday) after 24 hrs s/p TPA and imaging confirming no hemorrhagic transformation  - If stroke/TIA due to large vessel severe INTRACRANIAL stenosis: Consider  mg + Plavix 75 mg x 90 days, then ASA 81 mg or Plavix ALONE; administer after 24 hrs s/p TPA and imaging confirming no hemorrhagic transformation  - If stroke/TIA due to atrial fibrillation,  then in general: antiplatelet bridge for 4-49 days, then (re) anticoagulate (timing depending on size of stroke, blood on f/u imaging, TTE)  - If stroke/TIA and no clear etiology is found, consider outpatient 30 day event monitor to r/o PAFIB, especially if patient has CV risk factors  - Initiation of statin therapy is recommended to reduce risk of stroke and CV events, in patients with stroke/tia presumed to be of atherosclerotic origin and a LDL level >100mg/dl w/ or w/o evidence of CV disease. - Initiation of blood pressure therapy is indicated for previously untreated patients with stroke/tia who after the first several days have an established BP >925UCGC systolic or >96CYKG diastolic. Recommend HCTZ +/- ACE-I/ARB for greater stroke prevention        Discussed with ED Physician Dr. Jay Jay Aguilar.     At least 20 min of Telemedicine and time in conversation directly with ED staff and physician for the patient who is in imminent and life threatening deterioration without further treatment and evaluation. This Virtual Visit was conducted with patient's (and/or legal guardian's) consent, to provide telestroke consultation and necessary medical care. Time spent examining patient, reviewing the images personally, reviewing the chart, perform high complexity decision making and speaking with the nursing staff regarding recommendations    This is a Phone Consult, I have not seen the patient face to face, the telemedicine device was not utilized. Karen Shelton MD,  Stroke, Neurocritical Care And/or [address] [place] [address] Stroke Network  [id number] [place]  Electronically signed 2/6/2022 at 11:13 AM      This virtual visit was conducted via interactive/real-time audio/video.

## 2022-02-06 NOTE — ED NOTES
Family at bedside, pt tolerated TPA well, awaiting bed assignment. Q 30 min checks started.      Hudson Acosta RN  02/06/22 [id number]

## 2022-02-06 NOTE — ED NOTES
Time Neurologist page:[phone]  camacho      Time Stroke Alert called:1054  :    Time Neurologist called back:1105 camacho    X-Ray/CT [facility]  02/06/22 1109

## 2022-02-06 NOTE — ED NOTES
Spoke with pharmacist, Pamela Cogan, discontinued cardene drip due to vital signs /65. Bag kept in the room.       Jennifer Benjamin  02/06/22 1292

## 2022-02-06 NOTE — PROGRESS NOTES
[facility] Intensive Care Unit  Critical Care Consult 2/6/2022      Date of Admission: 2/6    CC: Left arm sided weakness, dysarthria     HPI: 80year old female with a PMH of , HLD, presented to the ED with left sided weakness and slurred speech. Negative CT of head and pt received TPA, admitted to ICU for further care. Patient currently complaining of a headache - but minor and improved from this am.     GCS15. PERRL. No residual weakness    Problem List:   Patient Active Problem List   Diagnosis    IBS (irritable bowel syndrome)    COPD (chronic obstructive pulmonary disease) (HCC)    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[address]    Meningioma (HCC)    Acquired hypothyroidism    Back pain    Abdominal pain    Abdominal pain, LUQ    Acute LUQ pain    Chest pain    GERD (gastroesophageal reflux disease)    Closed fracture of left proximal humerus    Acute ischemic stroke ([address]       Surgical/Interventional Procedures:   2/3: tPA 11:23    PMH:    has a past medical history of Acid reflux disease, Arthritis, Chronic back pain, COPD (chronic obstructive pulmonary disease) ([address] Hearing loss, Hyperlipidemia, Irritable bowel, Meningioma ([address] Migraine, Neuropathy, Osteoarthritis, Osteoporosis, Thyroid disease, and TIA (transient ischemic attack). PSH:    has a past surgical history that includes Hysterectomy; Tonsillectomy; Appendectomy; Thyroid surgery; Endoscopy, colon, diagnostic (8/2012); ERCP (10/31/2012); ERCP (12/05/2012); fracture surgery; eye surgery; Cholecystectomy; Fixation Kyphoplasty (08/03/2016); Breast surgery; Colonoscopy; Ankle surgery (1980); Breast biopsy (1978); pr office/outpt visit,procedure only (N/A, 8/1/2018); and blepharoplasty. Home Medications:   Prior to Admission medications    Medication Sig Start Date End Date Taking?  Authorizing Provider   ondansetron (ZOFRAN ODT) 4 MG disintegrating tablet Take 1 tablet by mouth every 8 hours as needed for Nausea or Vomiting 12/29/20 Maikel Avina,    albuterol sulfate  (90 Base) MCG/ACT inhaler Inhale 2 puffs into the lungs daily as needed 9/4/20   Historical Provider, MD   SUMAtriptan (IMITREX) 100 MG tablet Take 100 mg by mouth daily 8/27/20   Historical Provider, MD   ferrous sulfate (IRON 325) 325 (65 Fe) MG tablet Take 1 tablet by mouth daily (with breakfast) 3/11/20 4/10/20  Anne Stephens MD   celecoxib (CELEBREX) 200 MG capsule Take 200 mg by mouth daily     Historical Provider, MD   Cholecalciferol (VITAMIN D3) 2000 units CAPS Take 2,000 Units by mouth daily     Historical Provider, MD   aspirin 81 MG EC tablet Take 1 tablet by mouth daily 9/25/17   Zena Gregory DO   acetaminophen (TYLENOL) 325 MG tablet Take 650 mg by mouth every 6 hours as needed for Pain    Historical Provider, MD   dexlansoprazole (DEXILANT) 60 MG CPDR capsule Take 60 mg by mouth daily Instructed to take am of procedure    Historical Provider, MD   budesonide (PULMICORT) 180 MCG/ACT AEPB inhaler Inhale 2 puffs into the lungs daily Use am of 10/31/16    Historical Provider, MD   levothyroxine (SYNTHROID) 50 MCG tablet Take 50 mcg by mouth Six times weekly None on sunday    Historical Provider, MD       Allergies: Allergies   Allergen Reactions    Tramadol Other (See Comments)     Diaphoresis, drop in B/P, weakness    Amlodipine Besylate     Bentyl [Dicyclomine Hcl]     Codeine     Lactose Intolerance (Gi)     Mobic [Meloxicam]     Monosodium Glutamate     Other      Artificial Sweetners    Pantoprazole     Prednisone     Talwin [Pentazocine]        Social History:  Social History     Socioeconomic History    Marital status:       Spouse name: Not on file    Number of children: Not on file    Years of education: Not on file    Highest education level: Not on file   Occupational History    Not on file   Tobacco Use    Smoking status: Former Smoker     Packs/day: 1.00     Types: Cigarettes     Start date: 9/9/1959 Quit date: 1996     Years since quittin.0    Smokeless tobacco: Never Used   Vaping Use    Vaping Use: Never used   Substance and Sexual Activity    Alcohol use: Never    Drug use: Never    Sexual activity: Never     Birth control/protection: Surgical     Comment: Hysterectomy   Other Topics Concern    Not on file   Social History Narrative    6 cups coffee daily     Social Determinants of Health     Financial Resource Strain:     Difficulty of Paying Living Expenses: Not on file   Food Insecurity:     Worried About Running Out of Food in the Last Year: Not on file    Jaswinder of Food in the Last Year: Not on file   Transportation Needs:     Lack of Transportation (Medical): Not on file    Lack of Transportation (Non-Medical):  Not on file   Physical Activity:     Days of Exercise per Week: Not on file    Minutes of Exercise per Session: Not on file   Stress:     Feeling of Stress : Not on file   Social Connections:     Frequency of Communication with Friends and Family: Not on file    Frequency of Social Gatherings with Friends and Family: Not on file    Attends [employer] Services: Not on file    Active Member of [address] [place] [address] or Organizations: Not on file    Attends Club or Organization Meetings: Not on file    Marital Status: Not on file   Intimate Partner Violence:     Fear of Current or Ex-Partner: Not on file    Emotionally Abused: Not on file    Physically Abused: Not on file    Sexually Abused: Not on file   Housing Stability:     Unable to Pay for Housing in the Last Year: Not on file    Number of [place] in the Last Year: Not on file    Unstable Housing in the Last Year: Not on file       Family History:   Family History   Problem Relation Age of Onset    Cancer Mother         leukemia    Emphysema Father     Rheum Arthritis Sister     Cancer Brother     Cancer Brother     Heart Disease Brother        VITAL SIGNS:   BP (!) 156/116   Pulse 77   Temp 97.6 °F (36.4 °C) (Oral)   Resp 14   Ht 5' 1\" (1.549 m)   Wt 116 lb (52.6 kg)   SpO2 98%   BMI 21.92 kg/m²     PHYSICAL EXAM:    General appearance:  Comfortable. Pain Description: none  GCS:    4 - Opens eyes on own   6 - Follows simple motor commands  5 - Alert and oriented    Pupil size:  Left 3 mm    Right 3 mm  Pupil reaction: Yes  Wiggles fingers: Left Yes Right Yes  Hand grasp:   Left normal    Right normal  Wiggles toes: Left Yes    Right Yes  Plantar flexion: Left normal   Right normal    CONSTITUTIONAL: no acute distress, lying in hospital bed. NEUROLOGIC: PERRL, oriented x 4  CARDIOVASCULAR: S1 S2, regular rate, regular rhythm, no murmur/gallop/rub. Monitor:NSR  PULMONARY: no rhonchi/rales/wheezes, no use of accessory muscles  RENAL: green to gravity, clear yellow urine  ABDOMEN: soft, nontender, nondistended, nontympanic, no masses, no organomegaly, normal bowel sounds   MUSCULOSKELETAL: moves all extremities purposefully, 5/5 strength   SKIN/EXTREMITIES: no rashes/ecchymosis, no edema/clubbing, warm/dry, good capillary refill     Assessment & Plan:       · Neuro:  Suspected ischemic stroke s/p tPA. Monitor neuro status, Neurology following, stroke protocol  · CV: No acute issues. Monitor hemodynamics. BP goal < 140. PRN hydralazine & labetalol. 2Decho. · Pulm: No acute issues  Monitor RR & SpO2. Pulmonary hygiene  · GI: No acute issues. Gastric tube. NPO. Tube feeds. Diet. Monitor bowel function. · Renal:  No acute issues. Monitor BUN & Cr. Monitor electrolytes & replace as needed. Monitor urine output. · ID: No acute issues   · Endocrine: No acute issues. Monitor BS. · MSK: No acute issues. ROM. turn & reposition. PT/OT  · Heme: No acute issues. Monitor CBC. Bowel regime: Glycolax   Pain control/Sedation: Tylenol  DVT prophylaxis: SCDs.   No Lovenox/heparin until 24 hours post tpa  GI prophylaxis:   Mouth/Eye care: as needed  Green: Keep in place for critical care monitoring of fluid balance. Family update:  At bedside   Code status:   Limited     Disposition:  ICU      Electronically signed by Donita Gaspar MD on 2/6/2022 at 4:54 PM

## 2022-02-07 ENCOUNTER — APPOINTMENT (OUTPATIENT)
Dept: MRI IMAGING | Age: 87
DRG: 062 | End: 2022-02-07
Payer: MEDICARE

## 2022-02-07 PROBLEM — I16.9 HYPERTENSIVE CRISIS: Status: ACTIVE | Noted: 2022-02-07

## 2022-02-07 LAB
ALBUMIN SERPL-MCNC: 4.7 G/DL (ref 3.5–5.2)
ALP BLD-CCNC: 80 U/L (ref 35–104)
ALT SERPL-CCNC: 8 U/L (ref 0–32)
ANION GAP SERPL CALCULATED.3IONS-SCNC: 13 MMOL/L (ref 7–16)
AST SERPL-CCNC: 15 U/L (ref 0–31)
BASOPHILS ABSOLUTE: 0.03 E9/L (ref 0–0.2)
BASOPHILS RELATIVE PERCENT: 0.5 % (ref 0–2)
BILIRUB SERPL-MCNC: 0.6 MG/DL (ref 0–1.2)
BUN BLDV-MCNC: 13 MG/DL (ref 6–23)
CALCIUM IONIZED: 1.35 MMOL/L (ref 1.15–1.33)
CALCIUM SERPL-MCNC: 10 MG/DL (ref 8.6–10.2)
CHLORIDE BLD-SCNC: 104 MMOL/L (ref 98–107)
CO2: 23 MMOL/L (ref 22–29)
CREAT SERPL-MCNC: 0.7 MG/DL (ref 0.5–1)
EKG ATRIAL RATE: 74 BPM
EKG P AXIS: 53 DEGREES
EKG P-R INTERVAL: 176 MS
EKG Q-T INTERVAL: 382 MS
EKG QRS DURATION: 84 MS
EKG QTC CALCULATION (BAZETT): 424 MS
EKG R AXIS: -4 DEGREES
EKG T AXIS: 48 DEGREES
EKG VENTRICULAR RATE: 74 BPM
EOSINOPHILS ABSOLUTE: 0.05 E9/L (ref 0.05–0.5)
EOSINOPHILS RELATIVE PERCENT: 0.8 % (ref 0–6)
GFR AFRICAN AMERICAN: >60
GFR NON-AFRICAN AMERICAN: >60 ML/MIN/1.73
GLUCOSE BLD-MCNC: 113 MG/DL (ref 74–99)
HBA1C MFR BLD: 5.4 % (ref 4–5.6)
HCT VFR BLD CALC: 43.7 % (ref 34–48)
HEMOGLOBIN: 14.1 G/DL (ref 11.5–15.5)
IMMATURE GRANULOCYTES #: 0.02 E9/L
IMMATURE GRANULOCYTES %: 0.3 % (ref 0–5)
LYMPHOCYTES ABSOLUTE: 1.35 E9/L (ref 1.5–4)
LYMPHOCYTES RELATIVE PERCENT: 21.8 % (ref 20–42)
MAGNESIUM: 2.3 MG/DL (ref 1.6–2.6)
MCH RBC QN AUTO: 31.3 PG (ref 26–35)
MCHC RBC AUTO-ENTMCNC: 32.3 % (ref 32–34.5)
MCV RBC AUTO: 97.1 FL (ref 80–99.9)
MONOCYTES ABSOLUTE: 0.3 E9/L (ref 0.1–0.95)
MONOCYTES RELATIVE PERCENT: 4.8 % (ref 2–12)
NEUTROPHILS ABSOLUTE: 4.44 E9/L (ref 1.8–7.3)
NEUTROPHILS RELATIVE PERCENT: 71.8 % (ref 43–80)
PDW BLD-RTO: 12.9 FL (ref 11.5–15)
PHOSPHORUS: 3.9 MG/DL (ref 2.5–4.5)
PLATELET # BLD: 221 E9/L (ref 130–450)
PMV BLD AUTO: 9.5 FL (ref 7–12)
POTASSIUM SERPL-SCNC: 4.9 MMOL/L (ref 3.5–5)
RBC # BLD: 4.5 E12/L (ref 3.5–5.5)
SODIUM BLD-SCNC: 140 MMOL/L (ref 132–146)
TOTAL PROTEIN: 7.5 G/DL (ref 6.4–8.3)
WBC # BLD: 6.2 E9/L (ref 4.5–11.5)

## 2022-02-07 PROCEDURE — APPSS60 APP SPLIT SHARED TIME 46-60 MINUTES: Performed by: NURSE PRACTITIONER

## 2022-02-07 PROCEDURE — 6370000000 HC RX 637 (ALT 250 FOR IP): Performed by: INTERNAL MEDICINE

## 2022-02-07 PROCEDURE — 2580000003 HC RX 258: Performed by: INTERNAL MEDICINE

## 2022-02-07 PROCEDURE — 36415 COLL VENOUS BLD VENIPUNCTURE: CPT

## 2022-02-07 PROCEDURE — 85025 COMPLETE CBC W/AUTO DIFF WBC: CPT

## 2022-02-07 PROCEDURE — 6370000000 HC RX 637 (ALT 250 FOR IP): Performed by: NURSE PRACTITIONER

## 2022-02-07 PROCEDURE — 6360000002 HC RX W HCPCS: Performed by: NURSE PRACTITIONER

## 2022-02-07 PROCEDURE — 83036 HEMOGLOBIN GLYCOSYLATED A1C: CPT

## 2022-02-07 PROCEDURE — 97530 THERAPEUTIC ACTIVITIES: CPT

## 2022-02-07 PROCEDURE — 99291 CRITICAL CARE FIRST HOUR: CPT | Performed by: SURGERY

## 2022-02-07 PROCEDURE — 94640 AIRWAY INHALATION TREATMENT: CPT

## 2022-02-07 PROCEDURE — 97166 OT EVAL MOD COMPLEX 45 MIN: CPT

## 2022-02-07 PROCEDURE — 70551 MRI BRAIN STEM W/O DYE: CPT

## 2022-02-07 PROCEDURE — 93010 ELECTROCARDIOGRAM REPORT: CPT | Performed by: INTERNAL MEDICINE

## 2022-02-07 PROCEDURE — 6360000002 HC RX W HCPCS: Performed by: STUDENT IN AN ORGANIZED HEALTH CARE EDUCATION/TRAINING PROGRAM

## 2022-02-07 PROCEDURE — 99222 1ST HOSP IP/OBS MODERATE 55: CPT | Performed by: PSYCHIATRY & NEUROLOGY

## 2022-02-07 PROCEDURE — 83735 ASSAY OF MAGNESIUM: CPT

## 2022-02-07 PROCEDURE — 82330 ASSAY OF CALCIUM: CPT

## 2022-02-07 PROCEDURE — 6360000002 HC RX W HCPCS: Performed by: INTERNAL MEDICINE

## 2022-02-07 PROCEDURE — 80053 COMPREHEN METABOLIC PANEL: CPT

## 2022-02-07 PROCEDURE — 97162 PT EVAL MOD COMPLEX 30 MIN: CPT

## 2022-02-07 PROCEDURE — 2000000000 HC ICU R&B

## 2022-02-07 PROCEDURE — 84100 ASSAY OF PHOSPHORUS: CPT

## 2022-02-07 RX ORDER — LIDOCAINE 4 G/G
1 PATCH TOPICAL DAILY
Status: DISCONTINUED | OUTPATIENT
Start: 2022-02-07 | End: 2022-02-10 | Stop reason: HOSPADM

## 2022-02-07 RX ORDER — LORAZEPAM 2 MG/ML
1 INJECTION INTRAMUSCULAR
Status: COMPLETED | OUTPATIENT
Start: 2022-02-07 | End: 2022-02-07

## 2022-02-07 RX ORDER — HYDRALAZINE HYDROCHLORIDE 20 MG/ML
10 INJECTION INTRAMUSCULAR; INTRAVENOUS EVERY 10 MIN PRN
Status: DISCONTINUED | OUTPATIENT
Start: 2022-02-07 | End: 2022-02-09

## 2022-02-07 RX ORDER — ATORVASTATIN CALCIUM 20 MG/1
20 TABLET, FILM COATED ORAL NIGHTLY
Status: DISCONTINUED | OUTPATIENT
Start: 2022-02-07 | End: 2022-02-10 | Stop reason: HOSPADM

## 2022-02-07 RX ORDER — LABETALOL HYDROCHLORIDE 5 MG/ML
10 INJECTION, SOLUTION INTRAVENOUS EVERY 10 MIN PRN
Status: DISCONTINUED | OUTPATIENT
Start: 2022-02-07 | End: 2022-02-09

## 2022-02-07 RX ADMIN — ACETAMINOPHEN 650 MG: 325 TABLET ORAL at 01:35

## 2022-02-07 RX ADMIN — LEVOTHYROXINE SODIUM 50 MCG: 0.09 TABLET ORAL at 06:19

## 2022-02-07 RX ADMIN — BUDESONIDE 250 MCG: 0.25 SUSPENSION RESPIRATORY (INHALATION) at 08:15

## 2022-02-07 RX ADMIN — Medication 10 ML: at 20:30

## 2022-02-07 RX ADMIN — LORAZEPAM 1 MG: 2 INJECTION INTRAMUSCULAR; INTRAVENOUS at 12:23

## 2022-02-07 RX ADMIN — Medication 10 ML: at 09:39

## 2022-02-07 RX ADMIN — HYDRALAZINE HYDROCHLORIDE 10 MG: 20 INJECTION INTRAMUSCULAR; INTRAVENOUS at 08:45

## 2022-02-07 RX ADMIN — ACETAMINOPHEN 650 MG: 325 TABLET ORAL at 18:36

## 2022-02-07 ASSESSMENT — PAIN SCALES - GENERAL
PAINLEVEL_OUTOF10: 2
PAINLEVEL_OUTOF10: 0
PAINLEVEL_OUTOF10: 7
PAINLEVEL_OUTOF10: 0
PAINLEVEL_OUTOF10: 2
PAINLEVEL_OUTOF10: 0

## 2022-02-07 ASSESSMENT — PAIN DESCRIPTION - LOCATION
LOCATION: BACK
LOCATION: TOE (COMMENT WHICH ONE)
LOCATION: HEAD

## 2022-02-07 ASSESSMENT — PAIN DESCRIPTION - PAIN TYPE
TYPE: OTHER (COMMENT)
TYPE: ACUTE PAIN
TYPE: ACUTE PAIN

## 2022-02-07 ASSESSMENT — PAIN DESCRIPTION - DESCRIPTORS
DESCRIPTORS: ACHING
DESCRIPTORS: ACHING

## 2022-02-07 ASSESSMENT — PAIN DESCRIPTION - ORIENTATION: ORIENTATION: LEFT

## 2022-02-07 NOTE — CONSULTS
[facility]  Neurology Consult    Date:  2/7/2022  Patient Name:  Ghada Robles  YOB: 1927  MRN: [id number]     PCP:  Callie Cardenas MD   Referring:  No ref. provider found      Chief Complaint: Resolving left eye blurriness, left arm and left leg weakness and numbness, status post TPA    History obtained from: Patient    Chani Ortega is a 80 y.o. female with stroke likely second to small vessel disease ischemic changes. Plan  · MRI without contrast  · Repeat CT Head at 24 hours. If no bleeding, restart ASA for 21 days. Add plavix indefinitely. · Check lipids  · Start Lipitor 20mg daily  · Will consider echo and holter    History of Present Illness:  Ghada Robles is a 80 y.o. right handed female presenting for evaluation of left-sided deficits and headache. Patient with past medical history of COPD not on oxygen, hyperlipidemia, osteoporosis, TIA who presented to the emergency department on 2/6 with complaints of blurry nurse to the left eye, left arm and left leg weakness and numbness. Patient was found to be hypertensive. Patient NIH was 3, telestroke was consulted and patient was given TPA. Patient CTs showed likely small vessel ischemic changes, no penumbra, no LVO. Patient was treated for hypertension and transferred to the neuro ICU for further monitoring evaluation. Review of Systems:  Constitutional  · Weight loss: No  · Fever: No    Eyes  · Double Vision: Yes  · Visions loss: No    Ears, Nose, Mouth, and Throat  · Difficulty swallowing: No    Cardiovascular  · Chest Pain: No    Respiratory  · Shortness of Breath: No    Gastrointestinal  · Abdominal Pain: No    Genitourinary  · Difficulty with Urination: No    Integumentary  · Rash: No    Musculoskeletal  · Back Pain: No    Neurological  · Headaches: No  · Weakness: Yes  · Numbness:  Yes  · Seizures: No  · Difficulty with Memory: No  · Further symptoms noted in HPI    Psychiatric  · Anxiety: No  · Depression: No    Complete 10-point review of systems is negative except as noted above in my HPI    Medical History:   Past Medical History:   Diagnosis Date    Acid reflux disease     Arthritis     Chronic back pain     COPD (chronic obstructive pulmonary disease) (HCC)     Hearing loss     Hyperlipidemia     Irritable bowel     Meningioma ([address] 2018    Migraine     Neuropathy     Osteoarthritis     Osteoporosis     Thyroid disease     hypo    TIA (transient ischemic attack) 2018        Surgical History:   Past Surgical History:   Procedure Laterality Date    ANKLE SURGERY      pin placement    APPENDECTOMY      BLEPHAROPLASTY      BREAST BIOPSY      BREAST SURGERY      biopsy bilat     CHOLECYSTECTOMY      COLONOSCOPY      ENDOSCOPY, COLON, DIAGNOSTIC  2012    ERCP  10/31/2012    ERCP  2012    stent removal    EYE SURGERY      cataract    FIXATION KYPHOPLASTY  2016    LUMBAR 2    FRACTURE SURGERY      left ankle, pin placement    HYSTERECTOMY      [place] OFFICE/OUTPT VISIT,PROCEDURE ONLY N/A 2018    KYPHOPLASTY  T11  -- TIME UNDETERMINED performed by Suzy Orozco DO at [facility]    THYROID SURGERY      biopsy    TONSILLECTOMY          Family History:  Family History   Problem Relation Age of Onset    Cancer Mother         leukemia    Emphysema Father     Rheum Arthritis Sister     Cancer Brother     Cancer Brother     Heart Disease Brother        Social History:  Social History     Tobacco Use    Smoking status: Former Smoker     Packs/day: 1.00     Types: Cigarettes     Start date: 1959     Quit date: 1996     Years since quittin.0    Smokeless tobacco: Never Used   Vaping Use    Vaping Use: Never used   Substance Use Topics    Alcohol use: Never    Drug use: Never        Current Medications:      Current Facility-Administered Medications   Medication Dose Route Frequency Provider Last Rate Last Admin    labetalol (NORMODYNE;TRANDATE) injection 10 mg  10 mg IntraVENous Q10 Min PRN Nidia Funez APRN - CNP        hydrALAZINE (APRESOLINE) injection 10 mg  10 mg IntraVENous Q10 Min PRN NISHANT Payan CNP   10 mg at 02/07/22 0845    atorvastatin (LIPITOR) tablet 20 mg  20 mg Oral Nightly Azam Jurado MD        niCARdipine (CARDENE) 50 mg in sodium chloride 0.9 % 250 mL infusion  3-15 mg/hr IntraVENous Continuous Christina Best DO   Stopped at 02/06/22 1416    albuterol (PROVENTIL) nebulizer solution 2.5 mg  2.5 mg Nebulization Daily PRN Andria Hernandez, DO        budesonide (PULMICORT) nebulizer suspension 250 mcg  250 mcg Nebulization BID Andria Hernandez DO   250 mcg at 02/07/22 1468    dexlansoprazole (DEXILANT) delayed release capsule 60 mg  60 mg Oral Daily Susannah Waggoner DO        levothyroxine (SYNTHROID) tablet 50 mcg  50 mcg Oral Once per day on Mon Tue Wed Thu Fri Sat Andria Hernandez DO   50 mcg at 02/07/22 8101    sodium chloride flush 0.9 % injection 5-40 mL  5-40 mL IntraVENous 2 times per day Andria Hernandez DO   10 mL at 02/07/22 4847    sodium chloride flush 0.9 % injection 5-40 mL  5-40 mL IntraVENous PRN Andria Hernandez DO        0.9 % sodium chloride infusion  25 mL IntraVENous PRN Andria Hernandez DO        ondansetron (ZOFRAN-ODT) disintegrating tablet 4 mg  4 mg Oral Q8H PRN Andria Hernandez DO        Or    ondansetron [facility]) injection 4 mg  4 mg IntraVENous Q6H PRN Andria Hernandez, DO        polyethylene glycol (GLYCOLAX) packet 17 g  17 g Oral Daily PRN Andria Hernandez DO        acetaminophen (TYLENOL) tablet 650 mg  650 mg Oral Q6H PRN Andria Hernandez DO   650 mg at 02/07/22 5861    Or    acetaminophen (TYLENOL) suppository 650 mg  650 mg Rectal Q6H PRN Andria Hernandez,             Allergies:       Allergies   Allergen Reactions    Tramadol Other (See Comments)     Diaphoresis, drop in B/P, weakness    Amlodipine Besylate     Bentyl [Dicyclomine Hcl]     Codeine     Lactose Intolerance (Gi)     Mobic [Meloxicam]     Monosodium Glutamate     Other      Artificial Sweetners    Pantoprazole     Prednisone     Talwin [Pentazocine]         Physical Examination  Vitals   Vitals:    02/07/22 1000 02/07/22 1100 02/07/22 1200 02/07/22 1300   BP: (!) 152/79 131/69 136/69 136/74   Pulse: 95 95 98 105   Resp: 16 19 22 24   Temp: 97.7 °F (36.5 °C)  98.4 °F (36.9 °C)    TempSrc: Oral  Oral    SpO2: 97% 96% 96% 96%   Weight:       Height:            General: Patient appears in no acute distress. Awake and oriented x 4. HEENT: Normocephalic, atraumatic  Chest: Clear to auscultation bilaterally  Heart: No murmurs appreciated  Extremities/Peripheral vascular: No edema/swelling noted. No cold limbs noted. Neurologic Examination    Mental Status  Alert, and oriented to person, place and time. Speech is fluent with intact comprehension. No evidence of memory impairment. Attention and concentration appeared normal.     Cranial Nerves  II. Visual fields full to confrontation bilaterally. Fundoscopic exam: Discs sharp bilaterally. III, IV, VI: Pupils equally round and reactive to light, 4 to 2 mm bilaterally. EOMs: full, no nystagmus. V. Facial sensation intact to light touch bilaterally  VII: Facial movements symmetric and strong  VIII: Hearing intact to voice  IX,X: Palate elevates symmetrically.  No dysarthria  XI: Sternocleidomastoid and trapezius 5/5 bilaterally   XII: Tongue is midline    Motor     Right Left   Right Left   Deltoid 4 4  Hip Flexion 5 5   Biceps      4  4  Knee Extension 5 5   Triceps 4 4  Knee Flexion 5 5   Handgrip 4 4  Ankle Dorsiflexion 5 5       Ankle Plantarflexion 5 5     Tone: Normal in all four limbs    Bulk: Normal in all four limbs with no evidence of atrophy    Pronator drift: absent bilaterally    Sensation  · Light Touch: Intact distally in all four limbs  · Pinprick: Intact distally in all four limbs  · Vibration: Intact distally in all four limbs  · Proprioception: Intact distally in all four limbs    Reflexes     Right Left   Biceps 2 2   Brachioradialis 2 2   Triceps 2 2   Patellar 2 2   Achilles 2 2   ankle clonus none none     Toes down going bilaterally. Coordination  Rapid alternating movements normal in bilateral upper extremities  Finger to nose testing normal bilaterally  Heel to shin testing normal bilaterally    Gait    Deferred for safety/fall consideration    Labs  Recent Labs     02/07/22  0825      K 4.9      CO2 23   BUN 13   CREATININE 0.7   GLUCOSE 113*   CALCIUM 10.0   PROT 7.5   LABALBU 4.7   BILITOT 0.6   ALKPHOS 80   AST 15   ALT 8   WBC 6.2   RBC 4.50   HGB 14.1   HCT 43.7   MCV 97.1   MCH 31.3   MCHC 32.3   RDW 12.9      MPV 9.5   LABA1C 5.4     Imaging  MRI BRAIN WO CONTRAST   Preliminary Result   No acute intracranial abnormality. Incidental 1.6 cm meningioma within the left side of the posterior fossa   lateral to the left cerebellar hemisphere. Moderate chronic microvascular disease within the periventricular white   matter. XR CHEST PORTABLE   Final Result   Minimal blunting of the right costophrenic angle may represent a small   effusion or scarring. Chronic basilar reticular opacities. No alveolar consolidation/pneumonia detected. CT BRAIN PERFUSION   Final Result      No significant ischemic penumbra identified      This study was analyzed by the [employer]. ai algorithm. CTA HEAD W CONTRAST   Final Result   1. Estimated stenosis of the proximal right and left internal carotid   artery by NASCET criteria is not hemodynamically significant   2. Moderate atherosclerotic disease . 3. No large vessel occlusion identified            This study was analyzed by the [employer]. ai algorithm. CTA NECK W CONTRAST   Final Result   1.  Estimated stenosis of the proximal right and left internal carotid   artery by NASCET criteria is not hemodynamically significant   2. Moderate atherosclerotic disease . 3. No large vessel occlusion identified            This study was analyzed by the [employer] ai algorithm. CT HEAD WO CONTRAST   Final Result   Diffuse atrophy likely age related   Findings compatible with small vessel ischemic changes. Study was called at the time of dictation.       CT HEAD WO CONTRAST    (Results Pending)       I have personally reviewed the available images    Other Testing Results    Electronically signed by Yimi Dias MD on 2/7/2022 at 2:05 PM     Attending: Dr. Matthew Valdez MD

## 2022-02-07 NOTE — PROGRESS NOTES
Intensive Care Unit  Critical Care Consult  Daily Progress Note 2/7/2022    Date of Admission: 2/6/2022    CC: Left arm sided weakness, dysarthria     Hospital Course/ Overnight Events:  80year old female with a PMH of COPD, HLD, presented to the ED with left sided weakness and slurred speech. Negative CT of head and pt received TPA, admitted to ICU for further care. Patient currently complaining of a headache - but minor and improved from this am.   2/7 Alert and oriented, talkative. Required 1 PRN antihypertensive overnight. Repeat CT pending. MRI pending. PHYSICAL EXAM:    BP (!) 152/79   Pulse 95   Temp 97.7 °F (36.5 °C) (Oral)   Resp 16   Ht 5' 1\" (1.549 m)   Wt 116 lb (52.6 kg)   SpO2 97%   BMI 21.92 kg/m²     Intake/Output Summary (Last 24 hours) at 2/7/2022 1044  Last data filed at 2/7/2022 0800  Gross per 24 hour   Intake    Output 800 ml   Net -800 ml        General appearance:  Comfortable. Pain Description: denies all pain     GCS:  15  4 - Opens eyes on own   6 - Follows simple motor commands  5 - Alert and oriented- Talkative, answers all questions appropriately     Pupil size: Left 3 mm  Right 3 mm  Pupil reaction: Yes  Wiggles fingers: Left Yes Right Yes  Hand grasp:   Left normal     Right normal  Wiggles toes: Left Yes    Right Yes  Plantar flexion: Left normal    Right normal    CONSTITUTIONAL: No acute distress, lying in hospital bed. NEUROLOGIC: PERRL, oriented x 4  CARDIOVASCULAR: S1 S2, regular rate, regular rhythm, no murmur/gallop/rub. Monitor: SR  PULMONARY: Bilaterally clear, diminished. No rhonchi/rales/wheezes, no use of accessory muscles   RENAL: Voids.    ABDOMEN: soft, nontender, nondistended, nontympanic, normal bowel sounds   MUSCULOSKELETAL: moves all extremities purposefully, 5/5 strength   SKIN/EXTREMITIES: no rashes/ecchymosis, no edema/clubbing, warm/dry, good capillary refill     LINES: PIV    CONSULTS:   Medicine  Neurology  Critical Care     Past Medical History:   Diagnosis Date    Acid reflux disease     Arthritis     Chronic back pain     COPD (chronic obstructive pulmonary disease) (HCC)     Hearing loss     Hyperlipidemia     Irritable bowel     [address] ([facility] [address] 05/2018    Migraine     Neuropathy     Osteoarthritis     Osteoporosis     Thyroid disease     hypo    TIA (transient ischemic attack) 05/2018       ASSESSMENT/PLAN:       · Neuro: L sided weakness and slurred speech. Negative CT of head, s/p TPA. Hx: Migraine, neuropathy. Monitor neuro status, Neurology following. CT , MRI pending. · CV: . No acute issues. Hx of HTN and Hyperlipidemia. Monitor hemodynamics. BP goal less than 140 mm Hg. PRN hydralazine & labetalol. Statin. Echo-pending     · Pulm: No acute issues. Hx: COPD. Room Air. Pulmicort, Proventil. · GI: No acute issues. Hx: Acid reflux, IBS. Diet- Regular. Monitor bowel function. Speech   · Renal: No acute issues. Hx: Monitor BUN & Cr. Monitor electrolytes & replace as needed. Monitor urine output. · ID: No acute issues. Monitor WBC  · Endocrine: No acute issues. Hx: Hypothyroid. Levothyroxine. A1C pending. · MSK: No acute issues. Hx: Osteoarthritis, osteoporosis. ROM. turn & reposition. PT/OT  · Heme: No acute issues. Monitor CBC. Bowel regimen: Senna and Glycolax  Pain control/Sedation: Tylenol  DVT prophylaxis: SCD  GI prophylaxis: Diet, dexlansoprazole   Ancillary consults:  Neurology, Medicine and Critical Care  Patient/Family update:  Questions answered. Support given. Will update when available.     Code status:  Limited NO CPR, NO Shock, NO intubation, Yes to resuscitative medications     Disposition: Continue Neuro ICU    Electronically signed by NISHANT Dempsey CNP on 2/7/2022 at 10:44 AM

## 2022-02-07 NOTE — PLAN OF CARE
Problem: Falls - Risk of:  Goal: Will remain free from falls  Description: Will remain free from falls  Outcome: Met This Shift     Problem: Falls - Risk of:  Goal: Absence of physical injury  Description: Absence of physical injury  Outcome: Met This Shift     Problem: Pain:  Goal: Control of acute pain  Description: Control of acute pain  Outcome: Met This Shift     Problem: Skin Integrity:  Goal: Will show no infection signs and symptoms  Description: Will show no infection signs and symptoms  Outcome: Met This Shift     Problem: Skin Integrity:  Goal: Absence of new skin breakdown  Description: Absence of new skin breakdown  Outcome: Met This Shift     Problem: Neurological  Goal: Maximum potential motor/sensory/cognitive function  Outcome: Met This Shift

## 2022-02-07 NOTE — PROGRESS NOTES
[facility]  SURGICAL INTENSIVE CARE UNIT (SICU)  ATTENDING PHYSICIAN CRITICAL CARE PROGRESS NOTE     I have examined the patient, reviewed the record, and discussed the case with the APN/ resident. Please refer to the APN/ resident's note. I agree with the assessment and plan. I have reviewed all relevant labs and imaging data. The following summarizes my clinical findings and independent assessment. CC:  Critical care management for ischemic stroke    Hospital Course/Overnight Events:  80year old female with a PMH of COPD, HLD, presented to the ED with left sided weakness and slurred speech.  Negative CT of head and pt received TPA, admitted to ICU for further care. Patient currently complaining of a headache - but minor and improved from this am.   2/7 Alert and oriented, talkative    Patient states she still has a slight headache. Reports that she still feels somewhat weaker in her left leg. Tolerating a diet.     Awake and alert  Follows commands  Heart: Regular rate/rhythm; no murmur  Lungs: Fairly clear bilaterally  Abdomen: Soft; bowel sounds active; nontender/nondistended  Skin: Warm/dry  Extremities: Strength 5 out of 5 bilateral upper extremities and right lower extremity; minimally weaker left lower extremity compared to right    Labs/films personally reviewed--no intracranial hemorrhage on MRI; left cerebellar mass    Patient Active Problem List    Diagnosis Date Noted    Hypertensive crisis 02/07/2022    Acute ischemic stroke ([facility] [address] 02/06/2022    Closed fracture of left proximal humerus 03/06/2020    Chest pain 01/05/2020    GERD (gastroesophageal reflux disease) 01/05/2020    Abdominal pain 09/09/2019    Abdominal pain, LUQ 09/09/2019    Acute LUQ pain 09/09/2019    Acquired hypothyroidism 07/28/2018    Back pain 07/28/2018    Meningioma ([facility] [address] 05/04/2018    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[facility] [address] 05/03/2018    IBS (irritable bowel syndrome) 11/01/2014    OA (osteoarthritis) 11/01/2014    COPD (chronic obstructive pulmonary disease) ([facility] [address] 11/01/2014    Hypothyroidism 08/22/2012     Suspected ischemic stroke--s/p t-PA--monitor neuro exam  No antiplatelet therapy until 24 hours post t-PA  Statin  Echo  Diet as tolerated  PT/OT evals  Speech eval  DVT risk--PCDs    Pt is at risk for neurologic deterioration and requires ICU care    Fouzia Jackson MD, FACS  2/7/2022  5:11 PM      Critical care time exclusive of teaching and procedures = 37 minutes

## 2022-02-07 NOTE — CARE COORDINATION
Admitted with L sided weakness and hypertension. S/p tPa. Pt is alert and oriented. L sided weakness has resolved. Currently being evaluated by PT and OT. Spoke with pt's daughter Navneet Frias. Pt lives in a 1st floor condo across the nash from Navneet Frias. There is just a threshold to enter. Pt owns 3 walkers, a rollator, Transport wc, reg wc, shower bench and has hand rails in the shower. Navneet Frias provides most of her meals, but pt does occasionally cook. Verified PCP and preferred pharmacy. Pt has hx of lucinda at [facility] and that is 1st choice is lucinda needed. Pt also has hx of [facility] and if able to go home would use them. Referral made to both. Emailed lists of other [id number] and hhc in the event other choices are needed to Navneet Frias at [facility]. net

## 2022-02-07 NOTE — PROGRESS NOTES
[address]                                                               Patient Name: Frank Pittman  MRN: [id number]  : 1927  Room: [address] [place] [address]    Evaluating OT: Roxanna Escamilla OTR/YENI 0318    Referring Provider: Helder Pineda DO   Specific Provider Orders/Date: OT eval and treat (22)       Diagnosis: Acute Ischemic Stroke     Reason for admission: stoke like symptoms; blurred vision in L eye; L UE/LE weakness    Surgery/Procedures: s/p tPA     Pertinent Medical History: Arthritis, Arthritis, chronic back pian, COPD, hearing loss, HLD, Meningioma, Neuropathy, OA, Osteoporosis, Thyroid disease, TIA, Kyphoplasty (2018)     *Precautions:  Fall Risk, Douglas, impaired vision L eye; pt reporting double vision, Douglas, regular diet, SBP >140 ,DBP >90    Assessment of current deficits   [x] Functional mobility  [x]ADLs  [x] Strength               [x]Cognition   [x] Functional transfers   [x] IADLs         [x] Safety Awareness   [x]Endurance   [x] Fine Coordination        [x] ROM     [] Vision/perception   []Sensation    [x]Gross Motor Coordination [x] Balance   [] Delirium                  []Motor Control     [x] Communication    OT PLAN OF CARE   OT POC based on physician orders, patient diagnosis and results of clinical assessment.        Frequency/Duration: 1-3 days/wk for 1-2 weeks PRN    Specific OT Treatment to include:   ADL retraining/adapted techniques and AE recommendations to increase functional independence within precautions                    Energy conservation techniques to improve tolerance for selfcare routine   Functional transfer/mobility training/DME recommendations for increased independence, safety and fall prevention         Patient/family education to increase safety and functional independence within precautions Environmental modifications for safe mobility and completion of ADLs                           Cognitive retraining ex's to improve problem solving skills & safe participation in ADLs/IADLs  Sensory re-education techniques to improve extremity awareness, maintain skin integrity and improve hand function                             Visual/Perceptual retraining  to improve body awareness and safety during transfers and ADLs  Splinting/positioning needs to maintain joint/skin integrity and prevent contractures  Therapeutic activity to improve functional performance during ADLs/IADLs                                         Therapeutic exercise to improve tolerance and functional strength for ADLs   Balance retraining exercises/tasks for facilitation of postural control with dynamic challenges during ADLs . Positioning to improve functional independence  Neuromuscular re-education: facilitation of righting/equilibrium reactions, normalization muscle tone/facilitation active functional movement                      Delirium prevention/treatment    Modified [place] Scale   Score     Description  0             No symptoms  1             No significant disability despite symptoms  2             Slight disability; able to look after own affairs  3             Moderate disability; able to ambulate without assist/ requires assist with ADLs  4             Moderate/Severe disability;requires assist to ambulate/assist with ADLs  5             Severe disability;bedridden/incontinent   6               Score:   4    Recommended Adaptive Equipment: TBA: ADL AE, [employer], bathroom DME     Home Living: Pt lives in a condo (daughter lives across the nash) with 1 step/no ril to enter. Bathroom setup: tub/shower with seat; rail  Equipment owned: shower seat, [employer], rollator, WC    Prior Level of Function: IND with ADLs; Assist with IADLs (daugher cooks most meals and assists with laundry.)   [employer] for ambulation.    Driving: no    Pain Level: pt c/o headache this session; does not rate    Cognition: A&O: 4/4    Follows 1-2 step commands with occasional cue. Memory: fair   Comprehension fair   Problem solving: fair   Judgement/safety: fair               Communication skills: WFL           Vision: impaired; c/o blurry vision in L eye            Glasses:yes (not present)                                                   Hearing:[place]     RASS: 0  CAM-ICU: (NT) Delirium    UE Assessment:  Hand Dominance: Right [x]  Left []     ROM Strength   RUE  WFL 4/5   LUE WFL 4/5     Sensation: No c/o numbness/tingling in extremities.    Tone: WNL   Edema: WFL     Functional Assessment:  AM-PAC Daily Activity Raw Score: 15/24   Initial Eval Status  Date: 2/7 Treatment Status  Date: STGs = LTGs  Time frame: 7-14 days   Feeding S; set up  bed level                        Rishabh  while seated up in chair to increase activity tolerance        Grooming Min A  set up                        S   while standing sink level requiring [employer] for balance and demonstrating G tolerance      UB dressing/bathing Min A                          S       LB dressing/bathing Mod A  (decreased functional reach)                        S   using AE as needed for safe reach/ energy conservation       Toileting Mod A  to art depends brief                         S     Bed Mobility  Supine to sit:   Min A with HOB elevated     Sit to supine:   Min A                        S  in prep of ADL tasks & transfers   Functional Transfers Sit to stand:   Min A   from higher bed; lower chair with min cues for safe hand placement    Stand to sit:   Min A                        S  sit<>stand/functional bathroom transfers using AD/DME as needed for balance and safety   Functional Mobility Min A [employer]                       S   functional/bathroom mobility using AD as needed & demonstrating G safety     Balance Sitting:     Static:  SBA    Dynamic:Min A  Standing: Min A [employer];  Mod A [employer] dynamic   S dynamic sitting balance; S dynamic standing balance  during ADL tasks & transfers   Endurance/Activity Tolerance   F tolerance with light activity; pt c/o dizziness with activity    G   tolerance with moderate activity/self care routine   Visual/  Perceptual Impaired: continue to assess vision during  ADLs and functional mobility                        Vitals:   HR at rest: 99 bpm HR at end of session: 97 bpm   Spo2 at rest:-% Spo2 at end of session 95%   BP at rest:102/51 mmHg BP at end of session 109/68 mmHg       Treatment: OT treatment provided this date includes:  Bed mobility: Instruction on precautions prior to bed mobility to facilitate safe transfers and ADLS. HOB elevated to assist.     Balance retraining: Performed sitting/standing balance ex's with instruction to facilitate righting reactions with postural changes during ADLS. Pt provided with [employer] for balance; demo posterior LOB initially upon standing. ADL retraining: Instruction on adapted techniques to increase independence and safe reach during dressing/bathing activities. Pt can benefit from use of LB dressing  AE if interested pending progress. Functional transfer training:  Instruction on postural cues, hand placement/sequencing, & walker safety  to assist with balance and fall prevention during self care routine/bathroom transfers. Energy conservation: Education on breathing techniques, pacing, work simplification strategies & recommended bathroom DME for safety and energy conservation during self care tasks and activities of daily living. Line management and environmental modifications made prior to and end of session to ensure patient safety and to increase efficiency of session. Skilled monitoring of HR, O2 saturation, blood pressure and patient's response to activity performed throughout session. Comments: OK from RN to see patient. Upon arrival, patient supine in bed, agreeable to session.   Pt demo fair tolerance with fair understanding of education/techniques. At end of session, patient left seated in chair to increase activity tolerance. Pt with no complaints. Call light within reach, all lines and tubes intact. Pt instructed on use of call light for assistance and fall prevention. .    Patient presents with decreased ROM/strength,activity tolerance, dynamic balance, functional mobility limiting completion of ADLs and safety. Pt can benefit from continued skilled OT to increase safety, functional independence and quality of life. Rehab Potential: good for established goals    Patient / Family Goal: to return to PLOF    Patient and/or family were instructed/educated on diagnosis, prognosis/goals and plan of care. Pt demonstrated fair understanding. Evaluation Complexity: Moderate     · History: Expanded chart review of consults, imaging, and psychosocial history related to current functional performance. · Exam: 5+ performance deficits identified limiting functional independence and safe return home   · Assistance/Modification: Min/mod assistance or modifications required to perform tasks. May have comorbidities that affect occupational performance. [] Malnutrition indicators have been identified and nursing has been notified to ensure a dietitian consult is ordered. Time In:1415             Time Out: 1445         Total Treatment time: 15   Min Units   OT Eval Low 77089     OT Eval Medium 31291 X    OT Eval High 78267     OT Re-Eval [id number]     Therapeutic Ex 25327     Therapeutic Activities 62158 15 1   ADL/Self Care 23168     Orthotic Management 40704     Neuro Re-Ed 88678     Non-Billable Time        Evaluation time includes thorough review of current medical information, gathering information on past medical history/social history and prior level of function, completion of standardized testing/informal observation of tasks, assessment of data and development of POC/Goals.      Herson Dumont, OTR/L [id number]

## 2022-02-07 NOTE — PROGRESS NOTES
Physical Therapy  Physical Therapy Initial Assessment     Name: Manuel Copeland  : 1927  MRN: [id number]      Date of Service: 2022    Evaluating PT:  Keiry Hernandez, PT, DPT [id number]    Room #:  0667/3536-Q  Diagnosis:  Acute ischemic stroke [facility]) [I63.9]  PMHx/PSHx:  Arthritis, COPD, HLN, LBP, hearing loss, neuropathy, osteoporosis, Kyphoplasty (2018)  Procedure/Surgery:  tPA (22)  Precautions:  Falls, Double vision, SBP >140 ,DBP >90  Equipment Needs:  TBD    SUBJECTIVE:    Pt lives alone in a 1 story condo with 1 stairs to enter and no rail. Pt's daughter lives in [facility] next to pt. Pt ambulated with [employer] and mostly independent PTA. Pt's daughter assists with cooking and laundry. OBJECTIVE:   Initial Evaluation  Date: 22 Treatment Short Term/ Long Term   Goals   AM-PAC 6 Clicks 93/37     Was pt agreeable to Eval/treatment? Yes     Does pt have pain? No pain reported     Bed Mobility  Rolling: NT  Supine to sit: Fely with HOB elevated  Sit to supine: NT  Scooting: NT  Independent    Transfers Sit to stand: Fely  Stand to sit: Fely  Stand pivot: Fely with [employer]  Mod Independent with AAD    Ambulation   20 feet with Fely and [employer]  >150 feet with Mod Independent with AAD   Stair negotiation: ascended and descended NT  >4 steps with 1 rail Mod Independent    ROM BUE:  Defer to OT note  BLE:  WFL     Strength BUE:  Defer to OT note  BLE:  4/5  Increase by 1/3 MMT grade   Balance Sitting EOB:  Fely  Dynamic Standing:  Fely with [employer]  Sitting EOB:  Independent   Dynamic Standing:   Mod Independent with AAD     Pt is A & O x 4  CAM-ICU: NT  RASS: 0  Sensation:  No paraesthesias reported  Edema:  None     Vitals:  Heart Rate at rest 101 bpm Heart Rate post session 108 bpm   SpO2 at rest -% SpO2 post session 94%   Blood Pressure at rest 87/49 mmHg Blood Pressure post session 109/68 mmHg       Functional Status Score-Intensive Care Unit (FSS-ICU)   Rolling    Supine to sit transfer    Unsupported sitting   goals   1. Return home    Prognosis is good for reaching above PT goals. Patient and or family understand(s) diagnosis, prognosis, and plan of care. Yes    PHYSICAL THERAPY PLAN OF CARE:    PT POC is established based on physician order and patient diagnosis     Referring provider/PT Order:  Davis Loya DO /02/06/22 1600 PT eval and treat  Diagnosis:  Acute ischemic stroke ([facility] [address] [I63.9]  Specific instructions for next treatment:  Progress activity    Current Treatment Recommendations:     [x] Strengthening to improve independence with functional mobility   [x] ROM to improve independence with functional mobility   [x] Balance Training to improve static/dynamic balance and to reduce fall risk  [x] Endurance Training to improve activity tolerance during functional mobility   [x] Transfer Training to improve safety and independence with all functional transfers   [x] Gait Training to improve gait mechanics, endurance and asses need for appropriate assistive device  [x] Stair Training in preparation for safe discharge home and/or into the community   [x] Positioning to prevent skin breakdown and contractures  [x] Safety and Education Training   [x] Patient/Caregiver Education   [] HEP  [] Other     PT long term treatment goals are located in above grid    Frequency of treatments: 2-5x/week x 1-2 weeks. Time in  1405  Time out  1435    Total Treatment Time  15 minutes     Evaluation Time includes thorough review of current medical information, gathering information on past medical history/social history and prior level of function, completion of standardized testing/informal observation of tasks, assessment of data and education on plan of care and goals.     CPT codes:  [] Low Complexity PT evaluation 71779  [x] Moderate Complexity PT evaluation 83966  [] High Complexity PT evaluation 69363  [] PT Re-evaluation 55827  [] Gait training 70074 - minutes  [] Manual therapy 63610 - minutes  [x] Therapeutic activities  20 minutes  [] Therapeutic exercises 54671 - minutes  [] Neuromuscular reeducation 77820 - minutes     [facility], SPT   Gildardo Winona, [place]  [id number]

## 2022-02-07 NOTE — H&P
[address]                  [address] [facility]  [facility]                              HISTORY AND PHYSICAL    PATIENT NAME: Theresa Wolf                          :        1927  MED REC NO:   [id number]                            ROOM:       3820  ACCOUNT NO:   [de-identified]                           ADMIT DATE: 2022  PROVIDER:     Deandre Hickman DO    CHIEF COMPLAINT:  Left-sided weakness. HISTORY OF PRESENT ILLNESS:  The patient is a 70-year-old   female who presented to the emergency room complaining of acute onset of  left-sided weakness on the day of admission about 09:30 a.m. while at  home. She checked her blood pressure at home, was markedly elevated. She was seen in the emergency room. Her blood pressure was markedly  elevated. She was started on Cardene drip. She was given tPA. She was  admitted to the neuro ICU. The patient with history of hypertension,  was on Norvasc in the past which she did not tolerate. She was most  recently on clonidine initially b.i.d. which she took for one day. She  was most recently on clonidine 1 at bedtime per her PCP. PRIMARY CARE PHYSICIAN:  Annika Kee MD    PAST MEDICAL HISTORY:  Hypothyroidism, osteoarthritis, COPD, irritable  bowel syndrome, hypertension. PAST SURGICAL HISTORY:  Tonsillectomy, appendectomy, cholecystectomy,  thyroid surgery, wisdom teeth extraction, bilateral eye cataract  surgery, bilateral upper eyelid surgery, hysterectomy, hemorrhoidectomy,  bilateral feet bunion surgery, left ankle surgery. REVIEW OF SYSTEMS:  Remarkable for above-stated chief complaint. ALLERGIES:  TRAMADOL, AMLODIPINE, BENTYL, CODEINE, LACTOSE INTOLERANCE,  MOBIC, MSG, ARTIFICIAL SWEETENERS, PANTOPRAZOLE, PREDNISONE, TALWIN. MEDICATIONS PRIOR TO ADMISSION:  Vitamin D, Tylenol, Dexilant,  Pulmicort, Synthroid, clonidine.     SOCIAL HISTORY:  No tobacco, no alcohol. PHYSICAL EXAMINATION:  GENERAL APPEARANCE:  Reveals a 77-year-old  female who is alert  and oriented x3, cooperative and a good historian. VITAL SIGNS:  On admission, temperature 97.6, pulse 96, respirations 16,  blood pressure 219/99. HEENT:  Head:  Normocephalic, atraumatic. Eyes:  Pupils equal and  reactive to light. Extraocular muscles intact. Fundi not well  visualized. Nose, no obstruction, polyp or discharge noted. Mouth  mucosa without lesion. Teeth edentulous. Pharynx noninjected without  exudate. NECK:  Supple. No JVD. No thyromegaly. No carotid bruits. HEART:  Regular rate and rhythm with grade 1/6 systolic murmur. LUNGS:  Clear to auscultation bilaterally. ABDOMEN:  Positive bowel sounds, soft, nontender. No rebound or  guarding. No hepatosplenomegaly. No masses. BACK:  With increased thoracic kyphosis. EXTREMITIES:  Without edema. There are bilateral lower extremity  varicose veins. There is good strength in the upper and lower  extremities. LYMPH NODES:  No adenopathy noted. SKIN:  Without rash or lesion. IMPRESSION:  Left-sided weakness, status post tPA, hypertensive crisis,  hypothyroidism, osteoarthritis, COPD, irritable bowel syndrome,  hypertension. PLAN:  Continue post tPA protocol. Neurology to see. Blood pressure  control. PT, OT eval.   for discharge planning. DISCHARGE PLAN:  Home when stable.         Ye Becerra DO    D: 02/07/2022 7:12:12       T: 02/07/2022 7:14:38     [id number]  Job#: [id number]     Doc#: [id number]    CC:

## 2022-02-08 LAB
ALBUMIN SERPL-MCNC: 4.1 G/DL (ref 3.5–5.2)
ALP BLD-CCNC: 69 U/L (ref 35–104)
ALT SERPL-CCNC: 7 U/L (ref 0–32)
ANION GAP SERPL CALCULATED.3IONS-SCNC: 11 MMOL/L (ref 7–16)
AST SERPL-CCNC: 15 U/L (ref 0–31)
BASOPHILS ABSOLUTE: 0.02 E9/L (ref 0–0.2)
BASOPHILS RELATIVE PERCENT: 0.4 % (ref 0–2)
BILIRUB SERPL-MCNC: 0.6 MG/DL (ref 0–1.2)
BUN BLDV-MCNC: 20 MG/DL (ref 6–23)
CALCIUM SERPL-MCNC: 9.5 MG/DL (ref 8.6–10.2)
CHLORIDE BLD-SCNC: 100 MMOL/L (ref 98–107)
CHOLESTEROL, TOTAL: 198 MG/DL (ref 0–199)
CO2: 24 MMOL/L (ref 22–29)
CREAT SERPL-MCNC: 0.8 MG/DL (ref 0.5–1)
EOSINOPHILS ABSOLUTE: 0.19 E9/L (ref 0.05–0.5)
EOSINOPHILS RELATIVE PERCENT: 3.7 % (ref 0–6)
GFR AFRICAN AMERICAN: >60
GFR NON-AFRICAN AMERICAN: >60 ML/MIN/1.73
GLUCOSE BLD-MCNC: 107 MG/DL (ref 74–99)
HBA1C MFR BLD: 5.2 % (ref 4–5.6)
HCT VFR BLD CALC: 40.2 % (ref 34–48)
HDLC SERPL-MCNC: 62 MG/DL
HEMOGLOBIN: 13.3 G/DL (ref 11.5–15.5)
IMMATURE GRANULOCYTES #: 0.02 E9/L
IMMATURE GRANULOCYTES %: 0.4 % (ref 0–5)
LDL CHOLESTEROL CALCULATED: 115 MG/DL (ref 0–99)
LYMPHOCYTES ABSOLUTE: 1.59 E9/L (ref 1.5–4)
LYMPHOCYTES RELATIVE PERCENT: 30.7 % (ref 20–42)
MAGNESIUM: 2.1 MG/DL (ref 1.6–2.6)
MCH RBC QN AUTO: 31.9 PG (ref 26–35)
MCHC RBC AUTO-ENTMCNC: 33.1 % (ref 32–34.5)
MCV RBC AUTO: 96.4 FL (ref 80–99.9)
MONOCYTES ABSOLUTE: 0.38 E9/L (ref 0.1–0.95)
MONOCYTES RELATIVE PERCENT: 7.3 % (ref 2–12)
NEUTROPHILS ABSOLUTE: 2.98 E9/L (ref 1.8–7.3)
NEUTROPHILS RELATIVE PERCENT: 57.5 % (ref 43–80)
PDW BLD-RTO: 13 FL (ref 11.5–15)
PHOSPHORUS: 4.1 MG/DL (ref 2.5–4.5)
PLATELET # BLD: 205 E9/L (ref 130–450)
PMV BLD AUTO: 9.2 FL (ref 7–12)
POTASSIUM SERPL-SCNC: 4.5 MMOL/L (ref 3.5–5)
RBC # BLD: 4.17 E12/L (ref 3.5–5.5)
SODIUM BLD-SCNC: 135 MMOL/L (ref 132–146)
TOTAL PROTEIN: 6.6 G/DL (ref 6.4–8.3)
TRIGL SERPL-MCNC: 103 MG/DL (ref 0–149)
VLDLC SERPL CALC-MCNC: 21 MG/DL
WBC # BLD: 5.2 E9/L (ref 4.5–11.5)

## 2022-02-08 PROCEDURE — 6370000000 HC RX 637 (ALT 250 FOR IP): Performed by: STUDENT IN AN ORGANIZED HEALTH CARE EDUCATION/TRAINING PROGRAM

## 2022-02-08 PROCEDURE — 97535 SELF CARE MNGMENT TRAINING: CPT

## 2022-02-08 PROCEDURE — 2580000003 HC RX 258: Performed by: INTERNAL MEDICINE

## 2022-02-08 PROCEDURE — 99232 SBSQ HOSP IP/OBS MODERATE 35: CPT | Performed by: SURGERY

## 2022-02-08 PROCEDURE — 83735 ASSAY OF MAGNESIUM: CPT

## 2022-02-08 PROCEDURE — 94640 AIRWAY INHALATION TREATMENT: CPT

## 2022-02-08 PROCEDURE — 6370000000 HC RX 637 (ALT 250 FOR IP): Performed by: NURSE PRACTITIONER

## 2022-02-08 PROCEDURE — 2060000000 HC ICU INTERMEDIATE R&B

## 2022-02-08 PROCEDURE — APPSS30 APP SPLIT SHARED TIME 16-30 MINUTES: Performed by: NURSE PRACTITIONER

## 2022-02-08 PROCEDURE — 84100 ASSAY OF PHOSPHORUS: CPT

## 2022-02-08 PROCEDURE — 97530 THERAPEUTIC ACTIVITIES: CPT

## 2022-02-08 PROCEDURE — 80053 COMPREHEN METABOLIC PANEL: CPT

## 2022-02-08 PROCEDURE — 85025 COMPLETE CBC W/AUTO DIFF WBC: CPT

## 2022-02-08 PROCEDURE — 6370000000 HC RX 637 (ALT 250 FOR IP): Performed by: INTERNAL MEDICINE

## 2022-02-08 PROCEDURE — 6360000002 HC RX W HCPCS: Performed by: NURSE PRACTITIONER

## 2022-02-08 PROCEDURE — 80061 LIPID PANEL: CPT

## 2022-02-08 PROCEDURE — 83036 HEMOGLOBIN GLYCOSYLATED A1C: CPT

## 2022-02-08 PROCEDURE — 6360000002 HC RX W HCPCS: Performed by: INTERNAL MEDICINE

## 2022-02-08 PROCEDURE — 99233 SBSQ HOSP IP/OBS HIGH 50: CPT | Performed by: NURSE PRACTITIONER

## 2022-02-08 RX ORDER — ASPIRIN 81 MG/1
81 TABLET ORAL DAILY
Status: DISCONTINUED | OUTPATIENT
Start: 2022-02-08 | End: 2022-02-10 | Stop reason: HOSPADM

## 2022-02-08 RX ORDER — CLOPIDOGREL BISULFATE 75 MG/1
75 TABLET ORAL DAILY
Status: DISCONTINUED | OUTPATIENT
Start: 2022-02-08 | End: 2022-02-10 | Stop reason: HOSPADM

## 2022-02-08 RX ADMIN — CLOPIDOGREL BISULFATE 75 MG: 75 TABLET ORAL at 12:43

## 2022-02-08 RX ADMIN — Medication 10 ML: at 20:52

## 2022-02-08 RX ADMIN — HYDRALAZINE HYDROCHLORIDE 10 MG: 20 INJECTION INTRAMUSCULAR; INTRAVENOUS at 23:54

## 2022-02-08 RX ADMIN — Medication 5 ML: at 09:07

## 2022-02-08 RX ADMIN — ASPIRIN 81 MG: 81 TABLET, COATED ORAL at 11:29

## 2022-02-08 RX ADMIN — ATORVASTATIN CALCIUM 20 MG: 20 TABLET, FILM COATED ORAL at 20:52

## 2022-02-08 RX ADMIN — LEVOTHYROXINE SODIUM 50 MCG: 0.09 TABLET ORAL at 05:53

## 2022-02-08 RX ADMIN — ACETAMINOPHEN 650 MG: 325 TABLET ORAL at 15:45

## 2022-02-08 RX ADMIN — BUDESONIDE 250 MCG: 0.25 SUSPENSION RESPIRATORY (INHALATION) at 08:57

## 2022-02-08 ASSESSMENT — PAIN SCALES - GENERAL
PAINLEVEL_OUTOF10: 0
PAINLEVEL_OUTOF10: 5
PAINLEVEL_OUTOF10: 0

## 2022-02-08 ASSESSMENT — PAIN DESCRIPTION - DESCRIPTORS: DESCRIPTORS: ACHING

## 2022-02-08 ASSESSMENT — PAIN DESCRIPTION - FREQUENCY: FREQUENCY: INTERMITTENT

## 2022-02-08 ASSESSMENT — PAIN DESCRIPTION - ORIENTATION: ORIENTATION: LEFT

## 2022-02-08 ASSESSMENT — PAIN DESCRIPTION - LOCATION: LOCATION: BACK

## 2022-02-08 ASSESSMENT — PAIN DESCRIPTION - ONSET: ONSET: GRADUAL

## 2022-02-08 ASSESSMENT — PAIN DESCRIPTION - PAIN TYPE: TYPE: CHRONIC PAIN

## 2022-02-08 NOTE — PLAN OF CARE
Problem: Falls - Risk of:  Goal: Will remain free from falls  Description: Will remain free from falls  2/7/2022 2048 by Katheryn Condon RN  Outcome: Met This Shift     Problem: Falls - Risk of:  Goal: Absence of physical injury  Description: Absence of physical injury  2/7/2022 2048 by Katheryn Condon RN  Outcome: Met This Shift     Problem: Pain:  Goal: Pain level will decrease  Description: Pain level will decrease  Outcome: Met This Shift     Problem: Pain:  Goal: Control of acute pain  Description: Control of acute pain  2/7/2022 2048 by Katheryn Condon RN  Outcome: Met This Shift     Problem: Pain:  Goal: Control of chronic pain  Description: Control of chronic pain  Outcome: Met This Shift     Problem: Skin Integrity:  Goal: Will show no infection signs and symptoms  Description: Will show no infection signs and symptoms  2/7/2022 2048 by Katheryn Condon RN  Outcome: Met This Shift     Problem: Skin Integrity:  Goal: Absence of new skin breakdown  Description: Absence of new skin breakdown  2/7/2022 2048 by Katheryn Condon RN  Outcome: Met This Shift     Problem: Neurological  Goal: Maximum potential motor/sensory/cognitive function  2/7/2022 2048 by Katheryn Condon RN  Outcome: Met This Shift

## 2022-02-08 NOTE — CARE COORDINATION
2/8/2022 - Admitted for acute ischemic stroke. S/P tPa. A&O x4. Spoke with pt and her daughter, Gerard Velez, in room. They had reviewed choices for [facility] and C. [facility] has [facility] referral[facility] Other choices #2 [facility], #3 [facility]. Also requested referral to [facility] [address] if she would be able to discharge home. Sent referral message to [facility]. Sent referral message to [address].. Other choices for Troy Coe are #2[facility] and #3 [facility]. SW/CM will follow. Addendum - received message from Barb Deluna at [facility] - they can accept pt for Troy Coe. Will need HHC orders at discharge. SW/Cm will follow.

## 2022-02-08 NOTE — PROGRESS NOTES
Enrike Mantilla is a 80 y.o. right handed female     Neurology is following for acute stroke    PMH: COPD, hyperlipidemia, meningioma, neuropathy, OA, osteoporosis, thyroid disease, prior TIA, GERD, arthritis, chronic back pain, hearing loss    Patient presented to ED on 2/6 with left-sided weakness and weakness, blurred vision to left eye and headache. Patient's NIH was 3 and telestroke was consulted. Patient was given TPA. CT shows small vessel ischemic changes but no acute stroke or LVO. Patient was also hypertensive. Patient was transferred to neuro ICU status post TPA. MRI brain completed 24 hours post TPA showed no acute intracranial abnormality and no hemorrhage; incidental 1.6 cm meningioma noted. At present time patient sits in her chair by herself lunch. Patient states she feels much better today with no complaints    There is no family present at bedside    Vital signs at present time are stable; although has been intermittently hypertensive since admission    Objective:     /66   Pulse 84   Temp 98.4 °F (36.9 °C) (Oral)   Resp 20   Ht 5' 1\" (1.549 m)   Wt 111 lb 11.2 oz (50.7 kg)   SpO2 96%   BMI 21.11 kg/m²     General appearance: alert, appears stated age, cooperative and no distress  Head: normocephalic, without obvious abnormality, atraumatic  Eyes: conjunctivae/corneas clear.  .  Neck: supple, symmetrical, trachea midline and thyroid not enlarged  Lungs: Unlabored breaths on room air  Heart: regular rate and rhythm  Extremities: normal, atraumatic, no cyanosis or edema  Pulses: 2+ and symmetric  Skin: color, texture, turgor normal---no rashes or lesions      Mental Status: Awake, alert and oriented x4, follows commands, very conversant and pleasant    Appropriate attention/concentration  Intact fundus of knowledge  Intact memories    Speech: no dysarthria  Language: no aphasias    Cranial Nerves:  I: smell NA   II: visual acuity  NA   II: visual fields Full to confrontation   II: pupils MAYURI   III,VII: ptosis None   III,IV,VI: extraocular muscles  Full ROM   V: mastication Normal   V: facial light touch sensation  Normal   V,VII: corneal reflex     VII: facial muscle function - upper  Normal   VII: facial muscle function - lower Normal   VIII: hearing [place]    IX: soft palate elevation  Normal   IX,X: gag reflex    XI: trapezius strength  5/5   XI: sternocleidomastoid strength 5/5   XI: neck extension strength  5/5   XII: tongue strength  Normal     Motor:  Grossly 5/5 throughout  Normal bulk and tone  No abnormal movements    Sensory:  LT intact throughout    Coordination:   FN, FFM and TRAVIS intact    Gait:  Not tested    No Babinskis  No Sam's    No pathological reflexes    Laboratory/Radiology:     CBC:   Lab Results   Component Value Date    WBC 5.2 02/08/2022    RBC 4.17 02/08/2022    HGB 13.3 02/08/2022    HCT 40.2 02/08/2022    MCV 96.4 02/08/2022    MCH 31.9 02/08/2022    MCHC 33.1 02/08/2022    RDW 13.0 02/08/2022     02/08/2022    MPV 9.2 02/08/2022     CMP:    Lab Results   Component Value Date     02/08/2022    K 4.5 02/08/2022    K 3.9 05/04/2021     02/08/2022    CO2 24 02/08/2022    BUN 20 02/08/2022    CREATININE 0.8 02/08/2022    GFRAA >60 02/08/2022    LABGLOM >60 02/08/2022    GLUCOSE 107 02/08/2022    PROT 6.6 02/08/2022    LABALBU 4.1 02/08/2022    CALCIUM 9.5 02/08/2022    BILITOT 0.6 02/08/2022    ALKPHOS 69 02/08/2022    AST 15 02/08/2022    ALT 7 02/08/2022     U/A:    Lab Results   Component Value Date    COLORU Straw 02/06/2022    PROTEINU Negative 02/06/2022    PHUR 7.5 02/06/2022    WBCUA NONE 02/06/2022    RBCUA 1-3 02/06/2022    BACTERIA NONE SEEN 02/06/2022    CLARITYU Clear 02/06/2022    SPECGRAV 1.010 02/06/2022    LEUKOCYTESUR Negative 02/06/2022    UROBILINOGEN 0.2 02/06/2022    BILIRUBINUR Negative 02/06/2022    BLOODU TRACE-INTACT 02/06/2022    GLUCOSEU Negative 02/06/2022     HgBA1c:    Lab Results   Component Value Date    LABA1C 5.2 02/08/2022     FLP:    Lab Results   Component Value Date    TRIG 103 02/08/2022    HDL 62 02/08/2022    LDLCALC 115 02/08/2022    LABVLDL 21 02/08/2022     MRI BRAIN WO CONTRAST   Final Result   No acute intracranial abnormality. Incidental 1.6 cm meningioma within the left side of the posterior fossa   lateral to the left cerebellar hemisphere. Moderate chronic microvascular disease within the periventricular white   matter. XR CHEST PORTABLE   Final Result   Minimal blunting of the right costophrenic angle may represent a small   effusion or scarring. Chronic basilar reticular opacities. No alveolar consolidation/pneumonia detected. CT BRAIN PERFUSION   Final Result      No significant ischemic penumbra identified      This study was analyzed by the [employer] algorithm. CTA HEAD W CONTRAST   Final Result   1. Estimated stenosis of the proximal right and left internal carotid   artery by NASCET criteria is not hemodynamically significant   2. Moderate atherosclerotic disease . 3. No large vessel occlusion identified            This study was analyzed by the [employer] algorithm. CTA NECK W CONTRAST   Final Result   1. Estimated stenosis of the proximal right and left internal carotid   artery by NASCET criteria is not hemodynamically significant   2. Moderate atherosclerotic disease . 3. No large vessel occlusion identified            This study was analyzed by the [employer] algorithm. CT HEAD WO CONTRAST   Final Result   Diffuse atrophy likely age related   Findings compatible with small vessel ischemic changes. Study was called at the time of dictation.           All labs and images personally reviewed today    Assessment:     Patient admitted for left-sided weakness, blurred vision and headache   MRI was negative for acute stroke; no meningioma present   Echo not needed from a neuro standpoint at this time    At present time patient is awake, alert and oriented x4, following commands, conversant with no complaints. Stroke aborted by tPA administration. Plan:     Continue supportive care  Continue DAPT x3 weeks then Plavix only  Continue statin (was not on at home so will not increase right now)  A1c 5.2,   SCDs  Continue telemetry  Stroke education  Fall precautions    Okay to discharge from neuro standpoint once medically clear and okay with others. Neurology will sign off. Patient should follow in the stroke clinic in 1 to 2 weeks.     Libertyvgavlvjorge 207, APRGAVIN - NP-C   1:46 PM  2/8/2022

## 2022-02-08 NOTE — PLAN OF CARE
Problem: Falls - Risk of:  Goal: Will remain free from falls  Description: Will remain free from falls  2/8/2022 0123 by Sun Callaway RN  Outcome: Met This Shift     Problem: Falls - Risk of:  Goal: Absence of physical injury  Description: Absence of physical injury  2/8/2022 0123 by Sun Callaway RN  Outcome: Met This Shift     Problem: Pain:  Goal: Pain level will decrease  Description: Pain level will decrease  2/8/2022 0123 by Sun Callaway, RN  Outcome: Met This Shift     Problem: Pain:  Goal: Control of acute pain  Description: Control of acute pain  2/8/2022 0123 by Sun Callaway RN  Outcome: Met This Shift     Problem: Pain:  Goal: Control of chronic pain  Description: Control of chronic pain  2/8/2022 0123 by Sun Callaway RN  Outcome: Met This Shift     Problem: Skin Integrity:  Goal: Will show no infection signs and symptoms  Description: Will show no infection signs and symptoms  2/8/2022 0123 by Sun Callaway RN  Outcome: Met This Shift     Problem: Skin Integrity:  Goal: Absence of new skin breakdown  Description: Absence of new skin breakdown  2/8/2022 0123 by Sun Callaway RN  Outcome: Met This Shift

## 2022-02-08 NOTE — PROGRESS NOTES
Occupational Therapy  OT BEDSIDE TREATMENT NOTE   [address] [facility] [address] [facility]  [address] [facility], [address]  Patient Name: Luke Friedman  MRN: [id number]  : 1927  Room: /-A     Per OT Eval:    Evaluating OT: Sonia Posey, OTR/L      Referring Provider: Mike Larkin DO   Specific Provider Orders/Date: OT eval and treat (22)        Diagnosis: Acute Ischemic Stroke      Reason for admission: stoke like symptoms; blurred vision in L eye; L UE/LE weakness     Surgery/Procedures: s/p tPA      Pertinent Medical History: Arthritis, Arthritis, chronic back pian, COPD, hearing loss, HLD, Meningioma, Neuropathy, OA, Osteoporosis, Thyroid disease, TIA, Kyphoplasty (2018)      *Precautions:  Fall Risk, [place], impaired vision L eye; pt reporting double vision, [place], regular diet, SBP >140 ,DBP >90     Assessment of current deficits   [x]? Functional mobility          [x]? ADLs           [x]? Strength                  [x]? Cognition   [x]? Functional transfers        [x]? IADLs         [x]? Safety Awareness   [x]? Endurance   [x]? Fine Coordination           [x]? ROM           []? Vision/perception    []? Sensation     [x]? Gross Motor Coordination [x]? Balance    []? Delirium                  []?Motor Control     [x]?  Communication     OT PLAN OF CARE   OT POC based on physician orders, patient diagnosis and results of clinical assessment.        Frequency/Duration: 1-3 days/wk for 1-2 weeks PRN    Specific OT Treatment to include:   ADL retraining/adapted techniques and AE recommendations to increase functional independence within precautions                    Energy conservation techniques to improve tolerance for selfcare routine   Functional transfer/mobility training/DME recommendations for increased independence, safety and fall prevention         Patient/family education to increase safety and functional independence within precautions              Environmental modifications for safe mobility and completion of ADLs                           Cognitive retraining ex's to improve problem solving skills & safe participation in ADLs/IADLs  Sensory re-education techniques to improve extremity awareness, maintain skin integrity and improve hand function                             Visual/Perceptual retraining  to improve body awareness and safety during transfers and ADLs  Splinting/positioning needs to maintain joint/skin integrity and prevent contractures  Therapeutic activity to improve functional performance during ADLs/IADLs                                         Therapeutic exercise to improve tolerance and functional strength for ADLs   Balance retraining exercises/tasks for facilitation of postural control with dynamic challenges during ADLs . Positioning to improve functional independence  Neuromuscular re-education: facilitation of righting/equilibrium reactions, normalization muscle tone/facilitation active functional movement                      Delirium prevention/treatment     Modified Hamilton Scale   Score     Description  0             No symptoms  1             No significant disability despite symptoms  2             Slight disability; able to look after own affairs  3             Moderate disability; able to ambulate without assist/ requires assist with ADLs  4             Moderate/Severe disability;requires assist to ambulate/assist with ADLs  5             Severe disability;bedridden/incontinent   6               Score:   4     Recommended Adaptive Equipment: TBA: ADL AE, [facility], bathroom DME      Home Living: Pt lives in a condo (daughter lives across the nash) with 1 step/no ril to enter. Bathroom setup: tub/shower with seat; rail  Equipment owned: shower seat, [facility], rollator, WC     Prior Level of Function: IND with ADLs; Assist with IADLs (daugher cooks most meals and assists with laundry.)   [facility] for ambulation.    Driving: no     Pain Level: Pt did not complain of pain this session     Cognition: A&O: 4/4    Follows 1-2 step commands with occasional cue. Memory: fair             Comprehension fair             Problem solving: fair             Judgement/safety: fair                Communication skills: WFL             Vision: impaired; c/o blurry vision in L eye                 Glasses:yes (not present)                                                       Hearing:[place]               RASS: 0  CAM-ICU: (NT) Delirium     UE Assessment:  Hand Dominance: Right [x]? Left []?       ROM Strength   RUE  WFL 4/5   LUE WFL 4/5      Sensation: No c/o numbness/tingling in extremities. Tone: WNL   Edema: WFL     Functional Assessment:  AM-PAC Daily Activity Raw Score: 16/24    Initial Eval Status  Date: 2/7 Treatment Status  Date: 2/8/22 STGs = LTGs  Time frame: 7-14 days   Feeding S; set up  bed level  Setup  Pt seated upright in chair, able to grasp cup and bring to mouth                       Rishabh  while seated up in chair to increase activity tolerance         Grooming Min A  set up  Jolene  Pt washed face, applied deodorant seated EOB, denying brushing teeth                       S   while standing sink level requiring [employer] for balance and demonstrating G tolerance       UB dressing/bathing Min A    Jolene-dressing  Jg/doff hospital gown seated EOB, with assistance to manage of lines/tubes    SBA-bathing  Sponge bathing task seated EOB, pt able to wash of UB                        S         LB dressing/bathing Mod A  (decreased functional reach) Jolene-dressing  Pt able to jg pants seated EOB, with assistance to stand and pull over hips.  Pt able to doff of socks, donning of slipper shoes seated    modA-bathing  Sponge bathing task seated/standing, with pt able to wash of LE's, with assistance to stand and wash of buttocks/marizol area for safety                       S   using AE as needed for safe reach/ energy conservation      Toileting Mod A  to art depends brief  DNT  modA per previous level                        S      Bed Mobility  Supine to sit:   Min A with HOB elevated      Sit to supine:   Min A  Jolene  Supine<>EOB    HOB slightly elevated, use of bed rails                       S  in prep of ADL tasks & transfers   Functional Transfers Sit to stand:   Min A   from higher bed; lower chair with min cues for safe hand placement     Stand to sit:   Min A Jolene  Sit to Stand  Stand to Sit    Stand Pivot Transfer EOB<>Chair with HHA                        S  sit<>stand/functional bathroom transfers using AD/DME as needed for balance and safety   Functional Mobility Min A [employer] Jolene  Pt took of short steps during SPT with HHA, poor safety                      S   functional/bathroom mobility using AD as needed & demonstrating G safety      Balance Sitting:     Static:  SBA    Dynamic:Min A  Standing: Min A [employer];  Mod A [employer] dynamic   Sitting EOB:  SBA    Standing:  Jolene S dynamic sitting balance; S dynamic standing balance  during ADL tasks & transfers   Endurance/Activity Tolerance    F tolerance with light activity; pt c/o dizziness with activity   Fair-  G   tolerance with moderate activity/self care routine   Visual/  Perceptual Impaired: continue to assess vision during  ADLs and functional mobility                                   Education:  Pt was educated on role of OT, goals to be reached, importance of OOB activity, safety with bed mobility, safety and hand placement with transfers, safety/balance with functional mobility during SPT, and techniques to assist with LB dressing/bathing tasks. Comments: Upon arrival pt seated upright in bed, agreeable to therapy, speaking with nursing okaying pt to be seen this session. Pt's daughter present upon arrival, but leaving of room during session per her request. Pt being alert and talkative throughout session, able to follow of one steps directions, monitoring of vitals throughout.  At end of session, pt seated upright in chair, all lines and tubes intact, call light within reach, nursing aware and daughter not present. · Pt has made fair progress towards set goals.    · Continue with current plan of care focusing on increasing of independency with transfers and ADL tasks      Treatment Time In: 10:55am            Treatment Time Out: 11:20am           Treatment Charges: Mins Units   Ther Ex  [id number]     Manual Therapy [id number]     Thera Activities [id number]     ADL/Home Mgt [id number] 25 2   Neuro Re-ed [id number]     Group Therapy      Orthotic manage/training  [id number]     Non-Billable Time     Total Timed Treatment 25 2        Svitlana Jordan GAMING/L [id number]

## 2022-02-08 NOTE — PROGRESS NOTES
rashes/ecchymosis, no edema/clubbing, warm/dry, good capillary refill     LINES: PIV    CONSULTS:   Medicine  Neurology  Critical Care     Past Medical History:   Diagnosis Date    Acid reflux disease     Arthritis     Chronic back pain     [facility] [address]     Hearing loss     Hyperlipidemia     Irritable bowel     Meningioma ([facility] [address] 05/2018    Migraine     Neuropathy     Osteoarthritis     Osteoporosis     Thyroid disease     hypo    TIA (transient ischemic attack) 05/2018       ASSESSMENT/PLAN:       · Neuro: L sided weakness and slurred speech. Negative CT of head, s/p TPA. Hx: Migraine, neuropathy. Monitor neuro status, Neurology following. , 2/7 MRI- No ICH , ASA , Plavix. · CV: . No acute issues. Hx of HTN and Hyperlipidemia. Monitor hemodynamics. BP goal less than 140 mm Hg. PRN hydralazine & labetalol. Statin. LDL- 115. Echo-pending     · Pulm: No acute issues. Hx: COPD. Room Air. Pulmicort, Proventil. · GI: No acute issues. Hx: Acid reflux, IBS. Diet- Regular. Monitor bowel function. Speech   · Renal: No acute issues. Hx: Monitor BUN & Cr. Monitor electrolytes & replace as needed. Monitor urine output. · ID: No acute issues. Monitor WBC  · Endocrine: No acute issues. Hx: Hypothyroid. Levothyroxine. A1C- 5.4     · MSK: No acute issues. Hx: Osteoarthritis, osteoporosis. ROM. turn & reposition. PT/OT, AM-PAC 16/24. · Heme: No acute issues. Monitor CBC. Bowel regimen: Senna and Glycolax  Pain control/Sedation: Tylenol  DVT prophylaxis: SCD  GI prophylaxis: Diet, dexlansoprazole   Ancillary consults:  Neurology, Medicine and Critical Care  Patient/Family update:  Questions answered. Support given. Will update when available.     Code status:  Limited NO CPR, NO Shock, NO intubation, Yes to resuscitative medications     Disposition: Transfer    Electronically signed by NISHANT Cunningham CNP on 2/8/2022 at 9:35 AM

## 2022-02-08 NOTE — PROGRESS NOTES
Hospital Medicine    Subjective:  Pt alert conversive PT IS AWARE OF MENINGIOMA WHICH IS OLD SHE SEES DR Steph Powell      Current Facility-Administered Medications:     labetalol (NORMODYNE;TRANDATE) injection 10 mg, 10 mg, IntraVENous, Q10 Min PRN, Yuli Ovens, APRN - CNP    hydrALAZINE (APRESOLINE) injection 10 mg, 10 mg, IntraVENous, Q10 Min PRN, Yuli Ovens, APRN - CNP, 10 mg at 02/07/22 0845    atorvastatin (LIPITOR) tablet 20 mg, 20 mg, Oral, Nightly, Nubia Wilson MD    lidocaine 4 % external patch 1 patch, 1 patch, TransDERmal, Daily, Yuli Ovens, APRN - CNP, 1 patch at 02/07/22 1734    perflutren lipid microspheres (DEFINITY) injection 1.65 mg, 1.5 mL, IntraVENous, ONCE PRN, Yuli Ovens, APRN - CNP    albuterol (PROVENTIL) nebulizer solution 2.5 mg, 2.5 mg, Nebulization, Daily PRN, Ileene Amarillo Malmer, DO    budesonide (PULMICORT) nebulizer suspension 250 mcg, 250 mcg, Nebulization, BID, Ileene Amarillo Malmer, DO, 250 mcg at 02/07/22 4196    dexlansoprazole (DEXILANT) delayed release capsule 60 mg, 60 mg, Oral, Daily, Ileene Amarillo Malmer, DO    levothyroxine (SYNTHROID) tablet 50 mcg, 50 mcg, Oral, Once per day on Mon Tue Wed Thu Fri Sat, [facility] Malmer, DO, 50 mcg at 02/08/22 7692    sodium chloride flush 0.9 % injection 5-40 mL, 5-40 mL, IntraVENous, 2 times per day, Ileene Amarillo Malmer, DO, 10 mL at 02/07/22 2030    sodium chloride flush 0.9 % injection 5-40 mL, 5-40 mL, IntraVENous, PRN, Ileene Amarillo Malmer, DO    0.9 % sodium chloride infusion, 25 mL, IntraVENous, PRN, Ileene Amarillo Malmer, DO    ondansetron (ZOFRAN-ODT) disintegrating tablet 4 mg, 4 mg, Oral, Q8H PRN **OR** ondansetron (ZOFRAN) injection 4 mg, 4 mg, IntraVENous, Q6H PRN, Ileene Hamilton Malmer, DO    polyethylene glycol (GLYCOLAX) packet 17 g, 17 g, Oral, Daily PRN, Ileene Amarillo Malmer, DO    acetaminophen (TYLENOL) tablet 650 mg, 650 mg, Oral, Q6H PRN, 650 mg at 02/07/22 1836 **OR** acetaminophen (TYLENOL) suppository 650 mg, 650 mg, Rectal, Q6H PRN, Anabell Hernandez DO    Objective:    BP (!) 143/64   Pulse 80   Temp 98 °F (36.7 °C) (Oral)   Resp 20   Ht 5' 1\" (1.549 m)   Wt 111 lb 11.2 oz (50.7 kg)   SpO2 94%   BMI 21.11 kg/m²     Heart:  reg  Lungs:  ctab  Abd: + bs soft nontender  Extrem:  W/o edema    CBC with Differential:    Lab Results   Component Value Date    WBC 5.2 02/08/2022    RBC 4.17 02/08/2022    HGB 13.3 02/08/2022    HCT 40.2 02/08/2022     02/08/2022    MCV 96.4 02/08/2022    MCH 31.9 02/08/2022    MCHC 33.1 02/08/2022    RDW 13.0 02/08/2022    SEGSPCT 48 08/22/2012    LYMPHOPCT 30.7 02/08/2022    MONOPCT 7.3 02/08/2022    BASOPCT 0.4 02/08/2022    MONOSABS 0.38 02/08/2022    LYMPHSABS 1.59 02/08/2022    EOSABS 0.19 02/08/2022    BASOSABS 0.02 02/08/2022     CMP:    Lab Results   Component Value Date     02/08/2022    K 4.5 02/08/2022    K 3.9 05/04/2021     02/08/2022    CO2 24 02/08/2022    BUN 20 02/08/2022    CREATININE 0.8 02/08/2022    GFRAA >60 02/08/2022    LABGLOM >60 02/08/2022    GLUCOSE 107 02/08/2022    PROT 6.6 02/08/2022    LABALBU 4.1 02/08/2022    CALCIUM 9.5 02/08/2022    BILITOT 0.6 02/08/2022    ALKPHOS 69 02/08/2022    AST 15 02/08/2022    ALT 7 02/08/2022     Warfarin PT/INR:    Lab Results   Component Value Date    INR 1.1 02/06/2022    INR 1.1 04/13/2020    INR 1.1 03/06/2020    PROTIME 11.8 02/06/2022    PROTIME 11.9 04/13/2020    PROTIME 12.8 (H) 03/06/2020       Assessment:    Principal Problem:    Acute ischemic stroke (HCC)  Active Problems:    Hypothyroidism    IBS (irritable bowel syndrome)    OA (osteoarthritis)    COPD (chronic obstructive pulmonary disease) (HCC)    GERD (gastroesophageal reflux disease)    Hypertensive crisis  Resolved Problems:    * No resolved hospital problems.  *      Plan:  Increase activity dc planning home with home health resume asa when ok with neuro        Tomas Bone DO  7:54 AM  2/8/2022

## 2022-02-08 NOTE — PROGRESS NOTES
[facility]  SURGICAL INTENSIVE CARE UNIT (SICU)  ATTENDING PHYSICIAN CRITICAL CARE PROGRESS NOTE     I have examined the patient, reviewed the record, and discussed the case with the APN/ resident. Please refer to the APN/ resident's note. I agree with the assessment and plan. I have reviewed all relevant labs and imaging data. The following summarizes my clinical findings and independent assessment. CC:  Critical care management for ischemic stroke    Hospital Course/Overnight Events:  80year old female with a PMH of COPD, HLD, presented to the ED with left sided weakness and slurred speech.  Negative CT of head and pt received TPA, admitted to ICU for further care. Patient currently complaining of a headache - but minor and improved from this am.   2/7 Alert and oriented, talkative  2/8--nothing overnight    Patient reports headache is improved. States she still has some blurry vision in her left eye. Feels like left sided weakness has resolved. Tolerating a diet.     Awake and alert  Follows commands  Heart: Regular rate/rhythm; no murmur  Lungs: Fairly clear bilaterally  Abdomen: Soft; bowel sounds active; nontender/nondistended  Skin: Warm/dry  Extremities: Strength 5 out of 5 bilateral upper/lower extremities    Labs personally reviewed    Patient Active Problem List    Diagnosis Date Noted    Hypertensive crisis 02/07/2022    Acute ischemic stroke ([address] 02/06/2022    Closed fracture of left proximal humerus 03/06/2020    Chest pain 01/05/2020    GERD (gastroesophageal reflux disease) 01/05/2020    Abdominal pain 09/09/2019    Abdominal pain, LUQ 09/09/2019    Acute LUQ pain 09/09/2019    Acquired hypothyroidism 07/28/2018    Back pain 07/28/2018    Meningioma ([address] 05/04/2018    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

## 2022-02-09 ENCOUNTER — APPOINTMENT (OUTPATIENT)
Dept: ULTRASOUND IMAGING | Age: 87
DRG: 062 | End: 2022-02-09
Payer: MEDICARE

## 2022-02-09 LAB
ALBUMIN SERPL-MCNC: 4.4 G/DL (ref 3.5–5.2)
ALP BLD-CCNC: 70 U/L (ref 35–104)
ALT SERPL-CCNC: 8 U/L (ref 0–32)
ANION GAP SERPL CALCULATED.3IONS-SCNC: 14 MMOL/L (ref 7–16)
AST SERPL-CCNC: 16 U/L (ref 0–31)
BASOPHILS ABSOLUTE: 0.03 E9/L (ref 0–0.2)
BASOPHILS RELATIVE PERCENT: 0.5 % (ref 0–2)
BILIRUB SERPL-MCNC: 0.5 MG/DL (ref 0–1.2)
BUN BLDV-MCNC: 23 MG/DL (ref 6–23)
CALCIUM SERPL-MCNC: 9.8 MG/DL (ref 8.6–10.2)
CHLORIDE BLD-SCNC: 99 MMOL/L (ref 98–107)
CO2: 22 MMOL/L (ref 22–29)
CREAT SERPL-MCNC: 0.8 MG/DL (ref 0.5–1)
EOSINOPHILS ABSOLUTE: 0.1 E9/L (ref 0.05–0.5)
EOSINOPHILS RELATIVE PERCENT: 1.7 % (ref 0–6)
GFR AFRICAN AMERICAN: >60
GFR NON-AFRICAN AMERICAN: >60 ML/MIN/1.73
GLUCOSE BLD-MCNC: 123 MG/DL (ref 74–99)
HCT VFR BLD CALC: 41.8 % (ref 34–48)
HEMOGLOBIN: 13.6 G/DL (ref 11.5–15.5)
IMMATURE GRANULOCYTES #: 0.03 E9/L
IMMATURE GRANULOCYTES %: 0.5 % (ref 0–5)
LV EF: 70 %
LVEF MODALITY: NORMAL
LYMPHOCYTES ABSOLUTE: 1.59 E9/L (ref 1.5–4)
LYMPHOCYTES RELATIVE PERCENT: 26.5 % (ref 20–42)
MAGNESIUM: 2.1 MG/DL (ref 1.6–2.6)
MCH RBC QN AUTO: 31.6 PG (ref 26–35)
MCHC RBC AUTO-ENTMCNC: 32.5 % (ref 32–34.5)
MCV RBC AUTO: 97.2 FL (ref 80–99.9)
MONOCYTES ABSOLUTE: 0.31 E9/L (ref 0.1–0.95)
MONOCYTES RELATIVE PERCENT: 5.2 % (ref 2–12)
NEUTROPHILS ABSOLUTE: 3.94 E9/L (ref 1.8–7.3)
NEUTROPHILS RELATIVE PERCENT: 65.6 % (ref 43–80)
PDW BLD-RTO: 12.9 FL (ref 11.5–15)
PHOSPHORUS: 4.4 MG/DL (ref 2.5–4.5)
PLATELET # BLD: 240 E9/L (ref 130–450)
PMV BLD AUTO: 10.4 FL (ref 7–12)
POTASSIUM SERPL-SCNC: 4.4 MMOL/L (ref 3.5–5)
RBC # BLD: 4.3 E12/L (ref 3.5–5.5)
SODIUM BLD-SCNC: 135 MMOL/L (ref 132–146)
TOTAL PROTEIN: 6.9 G/DL (ref 6.4–8.3)
WBC # BLD: 6 E9/L (ref 4.5–11.5)

## 2022-02-09 PROCEDURE — 97530 THERAPEUTIC ACTIVITIES: CPT

## 2022-02-09 PROCEDURE — 93971 EXTREMITY STUDY: CPT | Performed by: RADIOLOGY

## 2022-02-09 PROCEDURE — 80053 COMPREHEN METABOLIC PANEL: CPT

## 2022-02-09 PROCEDURE — 6370000000 HC RX 637 (ALT 250 FOR IP): Performed by: STUDENT IN AN ORGANIZED HEALTH CARE EDUCATION/TRAINING PROGRAM

## 2022-02-09 PROCEDURE — 6360000002 HC RX W HCPCS: Performed by: INTERNAL MEDICINE

## 2022-02-09 PROCEDURE — 94640 AIRWAY INHALATION TREATMENT: CPT

## 2022-02-09 PROCEDURE — 93306 TTE W/DOPPLER COMPLETE: CPT

## 2022-02-09 PROCEDURE — 36415 COLL VENOUS BLD VENIPUNCTURE: CPT

## 2022-02-09 PROCEDURE — 83735 ASSAY OF MAGNESIUM: CPT

## 2022-02-09 PROCEDURE — 93970 EXTREMITY STUDY: CPT

## 2022-02-09 PROCEDURE — 6370000000 HC RX 637 (ALT 250 FOR IP): Performed by: NURSE PRACTITIONER

## 2022-02-09 PROCEDURE — 2060000000 HC ICU INTERMEDIATE R&B

## 2022-02-09 PROCEDURE — 84100 ASSAY OF PHOSPHORUS: CPT

## 2022-02-09 PROCEDURE — 97535 SELF CARE MNGMENT TRAINING: CPT

## 2022-02-09 PROCEDURE — 85025 COMPLETE CBC W/AUTO DIFF WBC: CPT

## 2022-02-09 PROCEDURE — 6370000000 HC RX 637 (ALT 250 FOR IP): Performed by: INTERNAL MEDICINE

## 2022-02-09 PROCEDURE — 2580000003 HC RX 258: Performed by: INTERNAL MEDICINE

## 2022-02-09 RX ORDER — LABETALOL HYDROCHLORIDE 5 MG/ML
10 INJECTION, SOLUTION INTRAVENOUS EVERY 4 HOURS PRN
Status: DISCONTINUED | OUTPATIENT
Start: 2022-02-09 | End: 2022-02-10 | Stop reason: HOSPADM

## 2022-02-09 RX ORDER — ATORVASTATIN CALCIUM 20 MG/1
20 TABLET, FILM COATED ORAL NIGHTLY
Qty: 30 TABLET | Refills: 0 | Status: SHIPPED | OUTPATIENT
Start: 2022-02-09 | End: 2022-03-11 | Stop reason: ALTCHOICE

## 2022-02-09 RX ORDER — CLOPIDOGREL BISULFATE 75 MG/1
75 TABLET ORAL DAILY
Qty: 30 TABLET | Refills: 0 | Status: SHIPPED | OUTPATIENT
Start: 2022-02-09 | End: 2022-03-11 | Stop reason: SDUPTHER

## 2022-02-09 RX ORDER — HYDRALAZINE HYDROCHLORIDE 20 MG/ML
10 INJECTION INTRAMUSCULAR; INTRAVENOUS EVERY 4 HOURS PRN
Status: DISCONTINUED | OUTPATIENT
Start: 2022-02-09 | End: 2022-02-10 | Stop reason: HOSPADM

## 2022-02-09 RX ADMIN — CLOPIDOGREL BISULFATE 75 MG: 75 TABLET ORAL at 10:31

## 2022-02-09 RX ADMIN — BUDESONIDE 250 MCG: 0.25 SUSPENSION RESPIRATORY (INHALATION) at 19:20

## 2022-02-09 RX ADMIN — ACETAMINOPHEN 650 MG: 325 TABLET ORAL at 10:31

## 2022-02-09 RX ADMIN — ATORVASTATIN CALCIUM 20 MG: 20 TABLET, FILM COATED ORAL at 20:05

## 2022-02-09 RX ADMIN — LEVOTHYROXINE SODIUM 50 MCG: 0.09 TABLET ORAL at 05:20

## 2022-02-09 RX ADMIN — Medication 10 ML: at 10:32

## 2022-02-09 RX ADMIN — ASPIRIN 81 MG: 81 TABLET, COATED ORAL at 10:31

## 2022-02-09 RX ADMIN — BUDESONIDE 250 MCG: 0.25 SUSPENSION RESPIRATORY (INHALATION) at 10:53

## 2022-02-09 ASSESSMENT — PAIN SCALES - GENERAL
PAINLEVEL_OUTOF10: 1
PAINLEVEL_OUTOF10: 0
PAINLEVEL_OUTOF10: 0
PAINLEVEL_OUTOF10: 4

## 2022-02-09 ASSESSMENT — PAIN DESCRIPTION - LOCATION: LOCATION: GENERALIZED

## 2022-02-09 ASSESSMENT — PAIN DESCRIPTION - FREQUENCY: FREQUENCY: CONTINUOUS

## 2022-02-09 ASSESSMENT — PAIN DESCRIPTION - PAIN TYPE: TYPE: CHRONIC PAIN

## 2022-02-09 ASSESSMENT — PAIN DESCRIPTION - ONSET: ONSET: ON-GOING

## 2022-02-09 ASSESSMENT — PAIN DESCRIPTION - PROGRESSION: CLINICAL_PROGRESSION: NOT CHANGED

## 2022-02-09 ASSESSMENT — PAIN DESCRIPTION - DESCRIPTORS: DESCRIPTORS: ACHING

## 2022-02-09 NOTE — PROGRESS NOTES
Environmental modifications for safe mobility and completion of ADLs                           Cognitive retraining ex's to improve problem solving skills & safe participation in ADLs/IADLs  Sensory re-education techniques to improve extremity awareness, maintain skin integrity and improve hand function                             Visual/Perceptual retraining  to improve body awareness and safety during transfers and ADLs  Splinting/positioning needs to maintain joint/skin integrity and prevent contractures  Therapeutic activity to improve functional performance during ADLs/IADLs                                         Therapeutic exercise to improve tolerance and functional strength for ADLs   Balance retraining exercises/tasks for facilitation of postural control with dynamic challenges during ADLs . Positioning to improve functional independence  Neuromuscular re-education: facilitation of righting/equilibrium reactions, normalization muscle tone/facilitation active functional movement                      Delirium prevention/treatment     Modified Bascom Scale   Score     Description  0             No symptoms  1             No significant disability despite symptoms  2             Slight disability; able to look after own affairs  3             Moderate disability; able to ambulate without assist/ requires assist with ADLs  4             Moderate/Severe disability;requires assist to ambulate/assist with ADLs  5             Severe disability;bedridden/incontinent   6               Score:   4     Recommended Adaptive Equipment: TBA: ADL AE, [employer], bathroom DME      Home Living: Pt lives in a condo (daughter lives across the nash) with 1 step/no ril to enter. Bathroom setup: tub/shower with seat; rail  Equipment owned: shower seat, [employer], rollator, WC     Prior Level of Function: IND with ADLs; Assist with IADLs (daugher cooks most meals and assists with laundry.)   [employer] for ambulation.    Driving: no     Pain Level: Pt did not complain of pain this session     Cognition: A&O: 4/4    Follows 2 step commands, pleasant & cooperative              Memory: Good             Comprehension: Good             Problem solving: Good             Judgement/safety: Good                Communication skills: WFL             Vision: impaired; c/o blurry vision in L eye                 Glasses:yes (not present)                                                       Hearing:[facility]              Functional Assessment:  AM-PAC Daily Activity Raw Score: 19/24    Initial Eval Status  Date: 2/7 Treatment Status  Date: 2/9/22 STGs = LTGs  Time frame: 7-14 days   Feeding S; set up  bed level Mod Indep  Up in chair                        Rishabh  while seated up in chair to increase activity tolerance         Grooming Min A  set up Supervision  Standing at sink washing hands                        Rishabh   while standing sink level requiring [employer] for balance and demonstrating G tolerance       UB dressing/bathing Min A    Set up                        Rishabh         LB dressing/bathing Mod A  (decreased functional reach) SBA  Donning slippers, brings legs up to ease task at EOB, seated on commode pt donned pants, threading legs then donning over hips                     Rishabh  using AE as needed for safe reach/ energy conservation        Toileting Mod A  to art depends brief  Mod Indep  With hygiene & clothing  Management                       Rishabh      Bed Mobility  Supine to sit:   Min A with HOB elevated      Sit to supine:   Min A Mod Indep  Supine > sit                       Rishabh  in prep of ADL tasks & transfers   Functional Transfers Sit to stand:   Min A   from higher bed; lower chair with min cues for safe hand placement     Stand to sit:   Min A Supervision/Mod Indep  Good safety with hand placement with good pace                   Rishabh  sit<>stand/functional bathroom transfers using AD/DME as needed for balance and safety   Functional Mobility Min A [employer] Supervision  With walker household distances with good safety noted                       Mod I   functional/bathroom mobility using AD as needed & demonstrating G safety      Balance Sitting:     Static:  SBA    Dynamic:Min A  Standing: Min A [employer];  Mod A [employer] dynamic      Endurance/Activity Tolerance    F tolerance with light activity; pt c/o dizziness with activity   Fair+ G   tolerance with moderate activity/self care routine   Visual/  Perceptual Impaired: continue to assess vision during  ADLs and functional mobility         significant improvement    No deficits noted                     Education:  Pt & daughter were both educated on safety & technique with tub/shower transfer & use of tub mounted grab bar due to unable to install grab bar into stud walls at home with daughter educated & instructed on use with Good understanding. Daughter ordered on [address] & thankful to be able to use at home with pt. Comments: Upon arrival pt seated upright in bed, agreeable to therapy. At end of session, pt seated upright in chair, all lines and tubes intact, call light within reach, daughter still present. Pt has made Good progress towards set goals. POC upgraded above due to patient progress. Fernando Young, OTR/L [id number]   Continue with current plan of care focusing on increasing of independency with transfers and ADL tasks      Treatment Time In: 9:10am            Treatment Time Out: 9:40am           Treatment Charges: Mins Units   Ther Ex  98291     Manual Therapy 72819     Thera Activities 02491 10 1   ADL/Home Mgt 01416 20 1   Neuro Re-ed 80917     Group Therapy      Orthotic manage/training  30689     Non-Billable Time     Total Timed Treatment 30 2        Nakia JSheree [address] [place] [address], GAMING/L [id number]    I have read the above note and agree with the documentation.   Anton Mcmillan #[id number]

## 2022-02-09 NOTE — PROGRESS NOTES
Call out to Dr. Guillaume Pride with 's recommendations \"She should have a DVT US done. If can be done in next couple days should be ok to go home\".  Awaiting callback      Per Dr. Guillaume Pride order US BLE stat and if daughter unable to transport patient home tonight, patient will discharge in the morning

## 2022-02-09 NOTE — CARE COORDINATION
Discharge orders noted, [place] [address] orders noted. Left message for Jennifer Pond at [facility] regarding discharge and orders.

## 2022-02-09 NOTE — PROGRESS NOTES
[facility] Medicine    Subjective:  Pt alert conversive stephanie diet      Current Facility-Administered Medications:     hydrALAZINE (APRESOLINE) injection 10 mg, 10 mg, IntraVENous, Q4H PRN, Jamari Adler, APRN - CNP    labetalol (NORMODYNE;TRANDATE) injection 10 mg, 10 mg, IntraVENous, Q4H PRN, Jamari Goad, APRN - CNP    aspirin EC tablet 81 mg, 81 mg, Oral, Daily, Jamari Goad, APRN - CNP, 81 mg at 02/08/22 1129    clopidogrel (PLAVIX) tablet 75 mg, 75 mg, Oral, Daily, Jamari Goad, APRN - CNP, 75 mg at 02/08/22 1243    atorvastatin (LIPITOR) tablet 20 mg, 20 mg, Oral, Nightly, Pao Caballero MD, 20 mg at 02/08/22 2052    lidocaine 4 % external patch 1 patch, 1 patch, TransDERmal, Daily, Jamari Goad, APRN - CNP, 1 patch at 02/07/22 1734    perflutren lipid microspheres (DEFINITY) injection 1.65 mg, 1.5 mL, IntraVENous, ONCE PRN, Jamari Adler, APRN - CNP    albuterol (PROVENTIL) nebulizer solution 2.5 mg, 2.5 mg, Nebulization, Daily PRN, Damaris Hernandez DO    budesonide (PULMICORT) nebulizer suspension 250 mcg, 250 mcg, Nebulization, BID, Damaris Hernandez DO, 250 mcg at 02/08/22 8721    dexlansoprazole (DEXILANT) delayed release capsule 60 mg, 60 mg, Oral, Daily, Damaris Hernandez DO    levothyroxine (SYNTHROID) tablet 50 mcg, 50 mcg, Oral, Once per day on Mon Tue Wed Thu Fri Sat, Damaris Hernandez DO, 50 mcg at 02/09/22 4928    sodium chloride flush 0.9 % injection 5-40 mL, 5-40 mL, IntraVENous, 2 times per day, Damaris Hernandez DO, 10 mL at 02/08/22 2052    sodium chloride flush 0.9 % injection 5-40 mL, 5-40 mL, IntraVENous, PRN, Damaris Hernandez DO    0.9 % sodium chloride infusion, 25 mL, IntraVENous, PRN, Damaris Hernandez DO    ondansetron (ZOFRAN-ODT) disintegrating tablet 4 mg, 4 mg, Oral, Q8H PRN **OR** ondansetron (ZOFRAN) injection 4 mg, 4 mg, IntraVENous, Q6H PRN, Damaris Hernandez DO    polyethylene glycol (GLYCOLAX) packet 17 g, 17 g, Oral, Daily PRN, Damaris Hernandez, DO    acetaminophen (TYLENOL) tablet 650 mg, 650 mg, Oral, Q6H PRN, 650 mg at 02/08/22 1545 **OR** acetaminophen (TYLENOL) suppository 650 mg, 650 mg, Rectal, Q6H PRN, Walihossein Stapleton DavidDO    Objective:    /74   Pulse 78   Temp 98.4 °F (36.9 °C) (Temporal)   Resp 18   Ht 5' 1\" (1.549 m)   Wt 111 lb 11.2 oz (50.7 kg)   SpO2 95%   BMI 21.11 kg/m²     Heart:  reg  Lungs:  ctab  Abd: = bs soft nontender  Extrem:  W/o edema    CBC with Differential:    Lab Results   Component Value Date    WBC 6.0 02/09/2022    RBC 4.30 02/09/2022    HGB 13.6 02/09/2022    HCT 41.8 02/09/2022     02/09/2022    MCV 97.2 02/09/2022    MCH 31.6 02/09/2022    MCHC 32.5 02/09/2022    RDW 12.9 02/09/2022    SEGSPCT 48 08/22/2012    LYMPHOPCT 26.5 02/09/2022    MONOPCT 5.2 02/09/2022    BASOPCT 0.5 02/09/2022    MONOSABS 0.31 02/09/2022    LYMPHSABS 1.59 02/09/2022    EOSABS 0.10 02/09/2022    BASOSABS 0.03 02/09/2022     CMP:    Lab Results   Component Value Date     02/08/2022    K 4.5 02/08/2022    K 3.9 05/04/2021     02/08/2022    CO2 24 02/08/2022    BUN 20 02/08/2022    CREATININE 0.8 02/08/2022    GFRAA >60 02/08/2022    LABGLOM >60 02/08/2022    GLUCOSE 107 02/08/2022    PROT 6.6 02/08/2022    LABALBU 4.1 02/08/2022    CALCIUM 9.5 02/08/2022    BILITOT 0.6 02/08/2022    ALKPHOS 69 02/08/2022    AST 15 02/08/2022    ALT 7 02/08/2022     Warfarin PT/INR:    Lab Results   Component Value Date    INR 1.1 02/06/2022    INR 1.1 04/13/2020    INR 1.1 03/06/2020    PROTIME 11.8 02/06/2022    PROTIME 11.9 04/13/2020    PROTIME 12.8 (H) 03/06/2020       Assessment:    Principal Problem:    Acute ischemic stroke (HCC)  Active Problems:    Hypothyroidism    IBS (irritable bowel syndrome)    OA (osteoarthritis)    COPD (chronic obstructive pulmonary disease) (HCC)    GERD (gastroesophageal reflux disease)    Hypertensive crisis  Resolved Problems:    * No resolved hospital problems.  *      Plan:  Dc planning await echo        Candance Buck, DO  7:38 AM  2/9/2022

## 2022-02-09 NOTE — PROGRESS NOTES
Physical Therapy  Physical Therapy Treatment Note   Name: Isela Mooney  : 1927  MRN: [id number]      Date of Service: 2022    Evaluating PT:  Sahara Benz, PT, DPT [id number]    Room #:  0136/6335-N  Diagnosis:  Acute ischemic stroke [facility]) [I63.9]  PMHx/PSHx:  Arthritis, COPD, HLN, LBP, hearing loss, neuropathy, osteoporosis, Kyphoplasty (2018)  Procedure/Surgery:  tPA (22)  Precautions:  Falls, Double vision, SBP >140 ,DBP >90  Equipment Needs:  TBD    SUBJECTIVE:    Pt lives alone in a 1 story condo with 1 stairs to enter and no rail. Pt's daughter lives in [facility] next to pt. Pt ambulated with [employer] and mostly independent PTA. Pt's daughter assists with cooking and laundry. OBJECTIVE:   Initial Evaluation  Date: 22 Treatment  Date: 22 Short Term/ Long Term   Goals   AM-PAC 6 Clicks 79/19 76/32    Was pt agreeable to Eval/treatment? Yes Yes    Does pt have pain? No pain reported None      Bed Mobility  Rolling: NT  Supine to sit: Fely with HOB elevated  Sit to supine: NT  Scooting: NT Supine to sit independent  Independent    Transfers Sit to stand: Fely  Stand to sit: Fely  Stand pivot: Fely with [employer] Sit to stand: mod I  Stand to sit: mod I  Stand pivot: mod I  with [employer] Mod Independent with AAD    Ambulation   20 feet with Fely and [employer] 200 feet with mod I  and [employer] >150 feet with Mod Independent with AAD   Stair negotiation: ascended and descended NT NT >4 steps with 1 rail Mod Independent    ROM BUE:  Defer to OT note  BLE:  WFL     Strength BUE:  Defer to OT note  BLE:  4/5  Increase by 1/3 MMT grade   Balance Sitting EOB:  Fely  Dynamic Standing:  Fely with [employer]  Sitting EOB:  Independent   Dynamic Standing: Mod Independent with AAD       Therapeutic Exercises:     Ankle pumps ( x10)   LAQs ( B LE x10)   Marching ( B LE x10)   Patient education  Pt educated on safety with mobility     Patient response to education:   Pt verbalized understanding Pt demonstrated skill Pt requires further education in this area   x x      ASSESSMENT:    Conditions Requiring Skilled Therapeutic Intervention:    [x]Decreased strength     [x]Decreased ROM  [x]Decreased functional mobility  [x]Decreased balance   [x]Decreased endurance   [x]Decreased posture  []Decreased sensation  []Decreased coordination   []Decreased vision  []Decreased safety awareness   []Increased pain       Comments: Nursing cleared pt for physical therapy. Pt in bed with daughter upon arrival and agreed to participate in therapy. Pt completed functional mobility as noted above. Pt is independent with all mobility. Discussed with pt and daughter about stair negotiation and both reported having a small platform step to enter. Pt reported not having difficulty with this. Instructed pt and daughter in home exercise program. Both verbalized a good understanding. Pt demonstrated good safety awareness with all mobility. Pt returned to chair with call light in reach. Treatment:  Patient practiced and was instructed in the following treatment:     Bed mobility training - pt able to complete task independently   Transfer training - pt able to complete task independently   Gait training- Pt is able to complete task independently       PLAN:    Patient is making good progress towards established goals. Will continue with current POC.       Time in 0910  Time out  0940    Total Treatment Time  30 minutes     CPT codes:  [] Gait training 32970 - minutes  [] Manual therapy 10913 - minutes  [x] Therapeutic activities 07120 30 minutes  [] Therapeutic exercises 39016 - minutes  [] Neuromuscular reeducation 12978 - minutes    Sergei Longoria [id number]

## 2022-02-10 VITALS
OXYGEN SATURATION: 96 % | RESPIRATION RATE: 14 BRPM | DIASTOLIC BLOOD PRESSURE: 71 MMHG | SYSTOLIC BLOOD PRESSURE: 129 MMHG | HEIGHT: 61 IN | TEMPERATURE: 98.5 F | HEART RATE: 73 BPM | WEIGHT: 111.7 LBS | BODY MASS INDEX: 21.09 KG/M2

## 2022-02-10 PROBLEM — I10 UNCONTROLLED HYPERTENSION: Status: ACTIVE | Noted: 2022-02-07

## 2022-02-10 LAB
ALBUMIN SERPL-MCNC: 3.9 G/DL (ref 3.5–5.2)
ALP BLD-CCNC: 67 U/L (ref 35–104)
ALT SERPL-CCNC: 8 U/L (ref 0–32)
ANION GAP SERPL CALCULATED.3IONS-SCNC: 15 MMOL/L (ref 7–16)
AST SERPL-CCNC: 17 U/L (ref 0–31)
BASOPHILS ABSOLUTE: 0.03 E9/L (ref 0–0.2)
BASOPHILS RELATIVE PERCENT: 0.6 % (ref 0–2)
BILIRUB SERPL-MCNC: 0.5 MG/DL (ref 0–1.2)
BUN BLDV-MCNC: 23 MG/DL (ref 6–23)
CALCIUM SERPL-MCNC: 9.3 MG/DL (ref 8.6–10.2)
CHLORIDE BLD-SCNC: 101 MMOL/L (ref 98–107)
CO2: 18 MMOL/L (ref 22–29)
CREAT SERPL-MCNC: 0.8 MG/DL (ref 0.5–1)
EOSINOPHILS ABSOLUTE: 0.21 E9/L (ref 0.05–0.5)
EOSINOPHILS RELATIVE PERCENT: 4.1 % (ref 0–6)
GFR AFRICAN AMERICAN: >60
GFR NON-AFRICAN AMERICAN: >60 ML/MIN/1.73
GLUCOSE BLD-MCNC: 98 MG/DL (ref 74–99)
HCT VFR BLD CALC: 38.6 % (ref 34–48)
HEMOGLOBIN: 12.6 G/DL (ref 11.5–15.5)
IMMATURE GRANULOCYTES #: 0.01 E9/L
IMMATURE GRANULOCYTES %: 0.2 % (ref 0–5)
LYMPHOCYTES ABSOLUTE: 1.66 E9/L (ref 1.5–4)
LYMPHOCYTES RELATIVE PERCENT: 32.4 % (ref 20–42)
MAGNESIUM: 1.9 MG/DL (ref 1.6–2.6)
MCH RBC QN AUTO: 31.4 PG (ref 26–35)
MCHC RBC AUTO-ENTMCNC: 32.6 % (ref 32–34.5)
MCV RBC AUTO: 96.3 FL (ref 80–99.9)
MONOCYTES ABSOLUTE: 0.39 E9/L (ref 0.1–0.95)
MONOCYTES RELATIVE PERCENT: 7.6 % (ref 2–12)
NEUTROPHILS ABSOLUTE: 2.83 E9/L (ref 1.8–7.3)
NEUTROPHILS RELATIVE PERCENT: 55.1 % (ref 43–80)
PDW BLD-RTO: 12.7 FL (ref 11.5–15)
PHOSPHORUS: 4.2 MG/DL (ref 2.5–4.5)
PLATELET # BLD: 229 E9/L (ref 130–450)
PMV BLD AUTO: 9.8 FL (ref 7–12)
POTASSIUM SERPL-SCNC: 4.5 MMOL/L (ref 3.5–5)
RBC # BLD: 4.01 E12/L (ref 3.5–5.5)
SODIUM BLD-SCNC: 134 MMOL/L (ref 132–146)
TOTAL PROTEIN: 6.5 G/DL (ref 6.4–8.3)
WBC # BLD: 5.1 E9/L (ref 4.5–11.5)

## 2022-02-10 PROCEDURE — 80053 COMPREHEN METABOLIC PANEL: CPT

## 2022-02-10 PROCEDURE — 85025 COMPLETE CBC W/AUTO DIFF WBC: CPT

## 2022-02-10 PROCEDURE — 6370000000 HC RX 637 (ALT 250 FOR IP): Performed by: INTERNAL MEDICINE

## 2022-02-10 PROCEDURE — 94760 N-INVAS EAR/PLS OXIMETRY 1: CPT

## 2022-02-10 PROCEDURE — 36415 COLL VENOUS BLD VENIPUNCTURE: CPT

## 2022-02-10 PROCEDURE — 94640 AIRWAY INHALATION TREATMENT: CPT

## 2022-02-10 PROCEDURE — 6360000002 HC RX W HCPCS: Performed by: INTERNAL MEDICINE

## 2022-02-10 PROCEDURE — 6370000000 HC RX 637 (ALT 250 FOR IP): Performed by: NURSE PRACTITIONER

## 2022-02-10 PROCEDURE — 84100 ASSAY OF PHOSPHORUS: CPT

## 2022-02-10 PROCEDURE — 99222 1ST HOSP IP/OBS MODERATE 55: CPT | Performed by: INTERNAL MEDICINE

## 2022-02-10 PROCEDURE — 83735 ASSAY OF MAGNESIUM: CPT

## 2022-02-10 RX ADMIN — ASPIRIN 81 MG: 81 TABLET, COATED ORAL at 09:41

## 2022-02-10 RX ADMIN — ACETAMINOPHEN 650 MG: 325 TABLET ORAL at 09:41

## 2022-02-10 RX ADMIN — LEVOTHYROXINE SODIUM 50 MCG: 0.09 TABLET ORAL at 05:15

## 2022-02-10 RX ADMIN — CLOPIDOGREL BISULFATE 75 MG: 75 TABLET ORAL at 09:41

## 2022-02-10 RX ADMIN — BUDESONIDE 250 MCG: 0.25 SUSPENSION RESPIRATORY (INHALATION) at 10:36

## 2022-02-10 ASSESSMENT — PAIN SCALES - GENERAL
PAINLEVEL_OUTOF10: 0
PAINLEVEL_OUTOF10: 0

## 2022-02-10 NOTE — CONSULTS
CHIEF COMPLAINT: Left-sided weakness since 2 hours    HISTORY OF PRESENT ILLNESS: Patient is a 80 y.o. female seen at the request of Marty Trujillo MD and not follow my office. Patient was admitted on 6 February for the acute onset left-sided weakness for 2 hours. NIH score was 3-4 in the ED, systolic blood pressure in the 200s. Initial work-up for stroke did not reveal anything on imaging. However due to disabling deficit IV TPA was given. Patient was then transferred to neuro ICU for further management. Post TPA symptoms resolved and MRI brain was negative. Patient was started on DAPT for 3 weeks followed by Plavix monotherapy by neurology. As part of work-up echo was done. Echo showed EF 70% with stage I diastolic dysfunction with small PFO on bubble study and moderately sclerotic aortic valve. Cardiology was consulted for the PFO. Today patient is alert oriented comfortable without any symptoms. Review of symptoms negative. Patient has a walker at home. Of note ultrasound DVT is also negative.       Past Medical History:   Diagnosis Date    Acid reflux disease     Arthritis     Chronic back pain     [facility] obstructive [facility] disease) [facility]     Hearing loss     Hyperlipidemia     Irritable bowel     Meningioma ([address] 05/2018    Migraine     Neuropathy     Osteoarthritis     Osteoporosis     Thyroid disease     hypo    TIA (transient ischemic attack) 05/2018       Patient Active Problem List   Diagnosis    Hypothyroidism    IBS (irritable bowel syndrome)    OA (osteoarthritis)    COPD [facility]ch[facility] obstructive [facility] disease) [facility]    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[facility])    Meningioma (HCC)    Acquired hypothyroidism    Back pain    Abdominal pain    Abdominal pain, LUQ    Acute LUQ pain    Chest pain    GERD (gastroesophageal reflux disease)    Closed fracture of left proximal humerus    Acute ischemic stroke ([address]    Hypertensive crisis       Allergies Allergen Reactions    Tramadol Other (See Comments)     Diaphoresis, drop in B/P, weakness    Amlodipine Besylate     Bentyl [Dicyclomine Hcl]     Codeine     Lactose Intolerance (Gi)     Mobic [Meloxicam]     Monosodium Glutamate     Other      Artificial Sweetners    Pantoprazole     Prednisone     Talwin [Pentazocine]        Current Facility-Administered Medications   Medication Dose Route Frequency Provider Last Rate Last Admin    hydrALAZINE (APRESOLINE) injection 10 mg  10 mg IntraVENous Q4H PRN Jamari Goad, APRN - CNP        labetalol (NORMODYNE;TRANDATE) injection 10 mg  10 mg IntraVENous Q4H PRN Jamari Goad, APRN - CNP        aspirin EC tablet 81 mg  81 mg Oral Daily Jamari Goad, APRN - CNP   81 mg at 02/10/22 0941    clopidogrel (PLAVIX) tablet 75 mg  75 mg Oral Daily [facility], APRN - CNP   75 mg at 02/10/22 0941    atorvastatin (LIPITOR) tablet 20 mg  20 mg Oral Nightly Pao Caballero MD   20 mg at 02/09/22 2005    lidocaine 4 % external patch 1 patch  1 patch TransDERmal Daily [facility], APRN - CNP   1 patch at 02/10/22 2529    perflutren lipid microspheres (DEFINITY) injection 1.65 mg  1.5 mL IntraVENous ONCE PRN [facility], APRN - CNP        albuterol (PROVENTIL) nebulizer solution 2.5 mg  2.5 mg Nebulization Daily PRN Damaris Hernandez, DO        budesonide (PULMICORT) nebulizer suspension 250 mcg  250 mcg Nebulization BID Damaris Toledomer, DO   250 mcg at 02/10/22 1036    dexlansoprazole (DEXILANT) delayed release capsule 60 mg  60 mg Oral Daily Damaris Shows David, DO        levothyroxine (SYNTHROID) tablet 50 mcg  50 mcg Oral Once per day on Mon Tue Wed Thu Fri Sat Damaris Shows Malmer, DO   50 mcg at 02/10/22 0515    sodium chloride flush 0.9 % injection 5-40 mL  5-40 mL IntraVENous 2 times per day Damaris Shows David, DO   10 mL at 02/09/22 1032    sodium chloride flush 0.9 % injection 5-40 mL  5-40 mL IntraVENous PRN Damaris Shows Tremer, DO        0.9 % of education: Not on file    Highest education level: Not on file   Occupational History    Not on file   Tobacco Use    Smoking status: Former Smoker     Packs/day: 1.00     Types: Cigarettes     Start date: 1959     Quit date: 1996     Years since quittin.0    Smokeless tobacco: Never Used   Vaping Use    Vaping Use: Never used   Substance and Sexual Activity    Alcohol use: Never    Drug use: Never    Sexual activity: Never     Birth control/protection: Surgical     Comment: Hysterectomy   Other Topics Concern    Not on file   Social History Narrative    6 cups coffee daily     Social Determinants of Health     Financial Resource Strain:     Difficulty of Paying Living Expenses: Not on file   Food Insecurity:     Worried About Running Out of Food in the Last Year: Not on file    Jaswinder of Food in the Last Year: Not on file   Transportation Needs:     Lack of Transportation (Medical): Not on file    Lack of Transportation (Non-Medical):  Not on file   Physical Activity:     Days of Exercise per Week: Not on file    Minutes of Exercise per Session: Not on file   Stress:     Feeling of Stress : Not on file   Social Connections:     Frequency of Communication with Friends and Family: Not on file    Frequency of Social Gatherings with Friends and Family: Not on file    Attends Restoration Services: Not on file    Active Member of [address] [place] [address] or Organizations: Not on file    Attends Club or Organization Meetings: Not on file    Marital Status: Not on file   Intimate Partner Violence:     Fear of Current or Ex-Partner: Not on file    Emotionally Abused: Not on file    Physically Abused: Not on file    Sexually Abused: Not on file   Housing Stability:     Unable to Pay for Housing in the Last Year: Not on file    Number of [place] in the Last Year: Not on file    Unstable Housing in the Last Year: Not on file       Family History   Problem Relation Age of Onset    Cancer Mother leukemia    Emphysema Father     Rheum Arthritis Sister     Cancer Brother     Cancer Brother     Heart Disease Brother        Review of Systems:   Heart: as above   Lungs: as above   Eyes: denies changes in vision or discharge. Ears: denies changes in hearing or pain. Nose: denies epistaxis or masses   Throat: denies sore throat or trouble swallowing. Neuro: denies numbness, tingling, tremors. Skin: denies rashes or itching. : denies hematuria, dysuria   GI: denies vomiting, diarrhea   Psych: denies mood changed, anxiety, depression. all others negative. Physical Exam   /71   Pulse 73   Temp 98.5 °F (36.9 °C) (Temporal)   Resp 14   Ht 5' 1\" (1.549 m)   Wt 111 lb 11.2 oz (50.7 kg)   SpO2 96%   BMI 21.11 kg/m²   Constitutional: Oriented to person, place, and time. Well-developed and well-nourished. No distress. Head: Normocephalic and atraumatic. Eyes: EOM are normal. Pupils are equal, round, and reactive to light. Neck: Normal range of motion. Neck supple. No hepatojugular reflux and no JVD present. Carotid bruit is not present. No tracheal deviation present. No thyromegaly present. Cardiovascular: Normal rate, regular rhythm, normal heart sounds and intact distal pulses. Exam reveals no gallop and no friction rub. No murmur heard. Pulmonary/Chest: Effort normal and breath sounds normal. No respiratory distress. No wheezes. No rales. No tenderness. Abdominal: Soft. Bowel sounds are normal. No distension and no mass. No tenderness. No rebound and no guarding. Musculoskeletal: Normal range of motion. No edema and no tenderness. Lymphadenopathy:   No cervical adenopathy. No groin adenopathy. Neurological: Alert and oriented to person, place, and time. Skin: Skin is warm and dry. No rash noted. Not diaphoretic. No erythema. Psychiatric: Normal mood and affect.  Behavior is normal.     CBC:   Lab Results   Component Value Date    WBC 5.1 02/10/2022    RBC 4.01 02/10/2022    HGB 12.6 02/10/2022    HCT 38.6 02/10/2022    MCV 96.3 02/10/2022    MCH 31.4 02/10/2022    MCHC 32.6 02/10/2022    RDW 12.7 02/10/2022     02/10/2022    MPV 9.8 02/10/2022     BMP:   Lab Results   Component Value Date     02/10/2022    K 4.5 02/10/2022    K 3.9 05/04/2021     02/10/2022    CO2 18 02/10/2022    BUN 23 02/10/2022    LABALBU 3.9 02/10/2022    CREATININE 0.8 02/10/2022    CALCIUM 9.3 02/10/2022    GFRAA >60 02/10/2022    LABGLOM >60 02/10/2022     Magnesium:    Lab Results   Component Value Date    MG 1.9 02/10/2022     Cardiac Enzymes:   Lab Results   Component Value Date    CKTOTAL 26 09/24/2017    CKTOTAL 29 09/23/2017    CKTOTAL 73 08/22/2012    CKMB 1.3 09/24/2017    CKMB 1.5 09/23/2017    CKMB 2.7 08/23/2012    TROPHS 12 (H) 02/06/2022      PT/INR:    Lab Results   Component Value Date    PROTIME 11.8 02/06/2022    INR 1.1 02/06/2022     TSH:    Lab Results   Component Value Date    TSH 3.120 09/24/2017       Rhythm Strip: normal sinus rhythm, unchanged from previous tracings. EKG:  normal sinus rhythm, unchanged from previous tracings. ASSESSMENT AND PLAN:  Problem List Items Addressed This Visit     * (Principal) Acute ischemic stroke ([facility] - Primary      Other Visit Diagnoses     Uncontrolled hypertension              1. Acute stroke s/p TPA - MRI brain negative , symptoms resolved, currently on DAPT for 21 days followed by Plavix monotherapy  2. PFO - noted on echo , EF 70%, Stage 1 DD    Plan:  · Discussed with neurology. No evidence of embolic stroke on imaging. This is first incidence of stroke. Ultrasound DVT negative. Currently both cardiology and neurology agreed to optimize meds for now. If there is repeat incidence of stroke in the future then will consider PFO closure.   · Cardiology to sign off    Migel Hampton- PGY2  Discussed with Dr. Magali Woods MD   [facility] Cardiology     NOTE: This report was transcribed using voice recognition software. Every effort was made to ensure accuracy; however, inadvertent computerized transcription errors may be present.

## 2022-02-10 NOTE — PROGRESS NOTES
All discharge information reviewed with patient and patient's daughter at this time including new medications, changes in medications and important follow up visits with PCP, stroke clinic and cardiology. She has no further questions at this time.

## 2022-02-10 NOTE — PLAN OF CARE
Problem: Falls - Risk of:  Goal: Will remain free from falls  Description: Will remain free from falls  Outcome: Met This Shift  Goal: Absence of physical injury  Description: Absence of physical injury  Outcome: Met This Shift     Problem: Pain:  Goal: Pain level will decrease  Description: Pain level will decrease  Outcome: Met This Shift  Goal: Control of acute pain  Description: Control of acute pain  Outcome: Met This Shift  Goal: Control of chronic pain  Description: Control of chronic pain  Outcome: Met This Shift     Problem: Skin Integrity:  Goal: Will show no infection signs and symptoms  Description: Will show no infection signs and symptoms  Outcome: Met This Shift  Goal: Absence of new skin breakdown  Description: Absence of new skin breakdown  Outcome: Met This Shift     Problem: Neurological  Goal: Maximum potential motor/sensory/cognitive function  Outcome: Met This Shift

## 2022-02-10 NOTE — PROGRESS NOTES
Hospital Medicine    Subjective:  Pt alert conversive echo with pfo us legs neg      Current Facility-Administered Medications:     hydrALAZINE (APRESOLINE) injection 10 mg, 10 mg, IntraVENous, Q4H PRN, Pepper Grater, APRN - CNP    labetalol (NORMODYNE;TRANDATE) injection 10 mg, 10 mg, IntraVENous, Q4H PRN, Pepper Grater, APRN - CNP    aspirin EC tablet 81 mg, 81 mg, Oral, Daily, Pepper Grater, APRN - CNP, 81 mg at 02/09/22 1031    clopidogrel (PLAVIX) tablet 75 mg, 75 mg, Oral, Daily, Pepper Grater, APRN - CNP, 75 mg at 02/09/22 1031    atorvastatin (LIPITOR) tablet 20 mg, 20 mg, Oral, Nightly, Mauricio Carroll MD, 20 mg at 02/09/22 2005    lidocaine 4 % external patch 1 patch, 1 patch, TransDERmal, Daily, Pepper Grater, APRN - CNP, 1 patch at 02/09/22 1031    perflutren lipid microspheres (DEFINITY) injection 1.65 mg, 1.5 mL, IntraVENous, ONCE PRN, Pepper Grater, APRN - CNP    albuterol (PROVENTIL) nebulizer solution 2.5 mg, 2.5 mg, Nebulization, Daily PRN, Jenn Hernandez, DO    budesonide (PULMICORT) nebulizer suspension 250 mcg, 250 mcg, Nebulization, BID, Jenn Castillo Malmer, DO, 250 mcg at 02/09/22 1920    dexlansoprazole (DEXILANT) delayed release capsule 60 mg, 60 mg, Oral, Daily, Jenn Toledomer, DO    levothyroxine (SYNTHROID) tablet 50 mcg, 50 mcg, Oral, Once per day on Mon Tue Wed Thu Fri Sat, Jenn Castillo Malmer, DO, 50 mcg at 02/10/22 6725    sodium chloride flush 0.9 % injection 5-40 mL, 5-40 mL, IntraVENous, 2 times per day, Jenn Hernandez, DO, 10 mL at 02/09/22 1032    sodium chloride flush 0.9 % injection 5-40 mL, 5-40 mL, IntraVENous, PRN, Jenn Hernandez, DO    0.9 % sodium chloride infusion, 25 mL, IntraVENous, PRN, Jenn Hernandez,     ondansetron (ZOFRAN-ODT) disintegrating tablet 4 mg, 4 mg, Oral, Q8H PRN **OR** ondansetron (ZOFRAN) injection 4 mg, 4 mg, IntraVENous, Q6H PRN, Jenn Hernandez DO    polyethylene glycol (GLYCOLAX) packet 17 g, 17 g, Oral, Daily PRN, [facility]    acetaminophen (TYLENOL) tablet 650 mg, 650 mg, Oral, Q6H PRN, 650 mg at 02/09/22 1031 **OR** acetaminophen (TYLENOL) suppository 650 mg, 650 mg, Rectal, Q6H PRN, Kacy Aparicioroldan Hernandez DO    Objective:    /71   Pulse 73   Temp 98.5 °F (36.9 °C) (Temporal)   Resp 16   Ht 5' 1\" (1.549 m)   Wt 111 lb 11.2 oz (50.7 kg)   SpO2 99%   BMI 21.11 kg/m²     Heart:  reg  Lungs:  ctab  Abd: + bs soft nontender  Extrem:  2/o edema    CBC with Differential:    Lab Results   Component Value Date    WBC 5.1 02/10/2022    RBC 4.01 02/10/2022    HGB 12.6 02/10/2022    HCT 38.6 02/10/2022     02/10/2022    MCV 96.3 02/10/2022    MCH 31.4 02/10/2022    MCHC 32.6 02/10/2022    RDW 12.7 02/10/2022    SEGSPCT 48 08/22/2012    LYMPHOPCT 32.4 02/10/2022    MONOPCT 7.6 02/10/2022    BASOPCT 0.6 02/10/2022    MONOSABS 0.39 02/10/2022    LYMPHSABS 1.66 02/10/2022    EOSABS 0.21 02/10/2022    BASOSABS 0.03 02/10/2022     CMP:    Lab Results   Component Value Date     02/10/2022    K 4.5 02/10/2022    K 3.9 05/04/2021     02/10/2022    CO2 18 02/10/2022    BUN 23 02/10/2022    CREATININE 0.8 02/10/2022    GFRAA >60 02/10/2022    LABGLOM >60 02/10/2022    GLUCOSE 98 02/10/2022    PROT 6.5 02/10/2022    LABALBU 3.9 02/10/2022    CALCIUM 9.3 02/10/2022    BILITOT 0.5 02/10/2022    ALKPHOS 67 02/10/2022    AST 17 02/10/2022    ALT 8 02/10/2022     Warfarin PT/INR:    Lab Results   Component Value Date    INR 1.1 02/06/2022    INR 1.1 04/13/2020    INR 1.1 03/06/2020    PROTIME 11.8 02/06/2022    PROTIME 11.9 04/13/2020    PROTIME 12.8 (H) 03/06/2020       Assessment:    Principal Problem:    Acute ischemic stroke (HCC)  Active Problems:    Hypothyroidism    IBS (irritable bowel syndrome)    OA (osteoarthritis)    COPD (chronic obstructive pulmonary disease) (HCC)    GERD (gastroesophageal reflux disease)    Hypertensive crisis  Resolved Problems:    * No resolved hospital problems. *      Plan:  Cardio to see [id number], DO  8:34 AM  2/10/2022

## 2022-02-11 ENCOUNTER — TELEPHONE (OUTPATIENT)
Dept: NEUROLOGY | Age: 87
End: 2022-02-11

## 2022-02-11 ENCOUNTER — TELEPHONE (OUTPATIENT)
Dept: ADMINISTRATIVE | Age: 87
End: 2022-02-11

## 2022-02-11 NOTE — TELEPHONE ENCOUNTER
Please call patient's daughter needs hospital follow up with Dr Bora Daniel, discharged 02/10  Mild stroke

## 2022-02-17 ENCOUNTER — HOSPITAL ENCOUNTER (EMERGENCY)
Age: 87
Discharge: HOME | End: 2022-02-17
Attending: EMERGENCY MEDICINE
Payer: MEDICARE

## 2022-02-17 ENCOUNTER — APPOINTMENT (OUTPATIENT)
Dept: GENERAL RADIOLOGY | Age: 87
End: 2022-02-17
Payer: MEDICARE

## 2022-02-17 ENCOUNTER — APPOINTMENT (OUTPATIENT)
Dept: CT IMAGING | Age: 87
End: 2022-02-17
Payer: MEDICARE

## 2022-02-17 VITALS
HEIGHT: 61 IN | BODY MASS INDEX: 21.14 KG/M2 | RESPIRATION RATE: 20 BRPM | HEART RATE: 73 BPM | WEIGHT: 112 LBS | OXYGEN SATURATION: 92 % | DIASTOLIC BLOOD PRESSURE: 86 MMHG | TEMPERATURE: 98.6 F | SYSTOLIC BLOOD PRESSURE: 155 MMHG

## 2022-02-17 DIAGNOSIS — R10.9 ABDOMINAL PAIN, UNSPECIFIED ABDOMINAL LOCATION: Primary | ICD-10-CM

## 2022-02-17 LAB
ALBUMIN SERPL-MCNC: 3.6 G/DL (ref 3.5–5.2)
ALP BLD-CCNC: 65 U/L (ref 35–104)
ALT SERPL-CCNC: 8 U/L (ref 0–32)
ANION GAP SERPL CALCULATED.3IONS-SCNC: 11 MMOL/L (ref 7–16)
AST SERPL-CCNC: 13 U/L (ref 0–31)
BASOPHILS ABSOLUTE: 0.06 E9/L (ref 0–0.2)
BASOPHILS RELATIVE PERCENT: 0.7 % (ref 0–2)
BILIRUB SERPL-MCNC: 0.3 MG/DL (ref 0–1.2)
BILIRUBIN URINE: NEGATIVE
BLOOD, URINE: NEGATIVE
BUN BLDV-MCNC: 8 MG/DL (ref 6–23)
CALCIUM SERPL-MCNC: 7.8 MG/DL (ref 8.6–10.2)
CHLORIDE BLD-SCNC: 107 MMOL/L (ref 98–107)
CLARITY: CLEAR
CO2: 20 MMOL/L (ref 22–29)
COLOR: YELLOW
CREAT SERPL-MCNC: 0.6 MG/DL (ref 0.5–1)
EOSINOPHILS ABSOLUTE: 0.04 E9/L (ref 0.05–0.5)
EOSINOPHILS RELATIVE PERCENT: 0.5 % (ref 0–6)
GFR AFRICAN AMERICAN: >60
GFR NON-AFRICAN AMERICAN: >60 ML/MIN/1.73
GLUCOSE BLD-MCNC: 93 MG/DL (ref 74–99)
GLUCOSE URINE: NEGATIVE MG/DL
HCT VFR BLD CALC: 37.8 % (ref 34–48)
HEMOGLOBIN: 12.2 G/DL (ref 11.5–15.5)
IMMATURE GRANULOCYTES #: 0.04 E9/L
IMMATURE GRANULOCYTES %: 0.5 % (ref 0–5)
KETONES, URINE: NEGATIVE MG/DL
LACTIC ACID: 1.6 MMOL/L (ref 0.5–2.2)
LEUKOCYTE ESTERASE, URINE: NEGATIVE
LIPASE: 17 U/L (ref 13–60)
LYMPHOCYTES ABSOLUTE: 1.04 E9/L (ref 1.5–4)
LYMPHOCYTES RELATIVE PERCENT: 13 % (ref 20–42)
MCH RBC QN AUTO: 31.2 PG (ref 26–35)
MCHC RBC AUTO-ENTMCNC: 32.3 % (ref 32–34.5)
MCV RBC AUTO: 96.7 FL (ref 80–99.9)
MONOCYTES ABSOLUTE: 0.32 E9/L (ref 0.1–0.95)
MONOCYTES RELATIVE PERCENT: 4 % (ref 2–12)
NEUTROPHILS ABSOLUTE: 6.51 E9/L (ref 1.8–7.3)
NEUTROPHILS RELATIVE PERCENT: 81.3 % (ref 43–80)
NITRITE, URINE: NEGATIVE
PDW BLD-RTO: 12.3 FL (ref 11.5–15)
PH UA: 8.5 (ref 5–9)
PLATELET # BLD: 252 E9/L (ref 130–450)
PMV BLD AUTO: 9.3 FL (ref 7–12)
POTASSIUM SERPL-SCNC: 3.6 MMOL/L (ref 3.5–5)
PROTEIN UA: NEGATIVE MG/DL
RBC # BLD: 3.91 E12/L (ref 3.5–5.5)
REASON FOR REJECTION: NORMAL
REJECTED TEST: NORMAL
SODIUM BLD-SCNC: 138 MMOL/L (ref 132–146)
SPECIFIC GRAVITY UA: 1.01 (ref 1–1.03)
TOTAL PROTEIN: 5.8 G/DL (ref 6.4–8.3)
TROPONIN, HIGH SENSITIVITY: 11 NG/L (ref 0–9)
TROPONIN, HIGH SENSITIVITY: 13 NG/L (ref 0–9)
UROBILINOGEN, URINE: 0.2 E.U./DL
WBC # BLD: 8 E9/L (ref 4.5–11.5)

## 2022-02-17 PROCEDURE — 36415 COLL VENOUS BLD VENIPUNCTURE: CPT

## 2022-02-17 PROCEDURE — 84484 ASSAY OF TROPONIN QUANT: CPT

## 2022-02-17 PROCEDURE — 80053 COMPREHEN METABOLIC PANEL: CPT

## 2022-02-17 PROCEDURE — 74176 CT ABD & PELVIS W/O CONTRAST: CPT

## 2022-02-17 PROCEDURE — 93005 ELECTROCARDIOGRAM TRACING: CPT | Performed by: EMERGENCY MEDICINE

## 2022-02-17 PROCEDURE — 6360000002 HC RX W HCPCS

## 2022-02-17 PROCEDURE — 87088 URINE BACTERIA CULTURE: CPT

## 2022-02-17 PROCEDURE — 99285 EMERGENCY DEPT VISIT HI MDM: CPT

## 2022-02-17 PROCEDURE — 71045 X-RAY EXAM CHEST 1 VIEW: CPT

## 2022-02-17 PROCEDURE — 83690 ASSAY OF LIPASE: CPT

## 2022-02-17 PROCEDURE — 83605 ASSAY OF LACTIC ACID: CPT

## 2022-02-17 PROCEDURE — 85025 COMPLETE CBC W/AUTO DIFF WBC: CPT

## 2022-02-17 PROCEDURE — 81003 URINALYSIS AUTO W/O SCOPE: CPT

## 2022-02-17 PROCEDURE — 2580000003 HC RX 258: Performed by: EMERGENCY MEDICINE

## 2022-02-17 PROCEDURE — 6370000000 HC RX 637 (ALT 250 FOR IP): Performed by: EMERGENCY MEDICINE

## 2022-02-17 RX ORDER — HYDROCODONE BITARTRATE AND ACETAMINOPHEN 5; 325 MG/1; MG/1
1 TABLET ORAL EVERY 4 HOURS PRN
Qty: 10 TABLET | Refills: 0 | Status: SHIPPED | OUTPATIENT
Start: 2022-02-17 | End: 2022-02-20

## 2022-02-17 RX ORDER — POLYETHYLENE GLYCOL 3350 17 G/17G
17 POWDER, FOR SOLUTION ORAL EVERY OTHER DAY
COMMUNITY

## 2022-02-17 RX ORDER — SODIUM CHLORIDE 9 MG/ML
INJECTION, SOLUTION INTRAVENOUS CONTINUOUS
Status: DISCONTINUED | OUTPATIENT
Start: 2022-02-17 | End: 2022-02-17 | Stop reason: HOSPADM

## 2022-02-17 RX ORDER — FENTANYL CITRATE 50 UG/ML
INJECTION, SOLUTION INTRAMUSCULAR; INTRAVENOUS
Status: COMPLETED
Start: 2022-02-17 | End: 2022-02-17

## 2022-02-17 RX ORDER — SUCRALFATE 1 G/1
1 TABLET ORAL ONCE
Status: COMPLETED | OUTPATIENT
Start: 2022-02-17 | End: 2022-02-17

## 2022-02-17 RX ADMIN — SUCRALFATE 1 G: 1 TABLET ORAL at 12:25

## 2022-02-17 RX ADMIN — LIDOCAINE HYDROCHLORIDE: 20 SOLUTION ORAL; TOPICAL at 12:25

## 2022-02-17 RX ADMIN — FENTANYL CITRATE 50 MCG: 50 INJECTION, SOLUTION INTRAMUSCULAR; INTRAVENOUS at 15:20

## 2022-02-17 RX ADMIN — SODIUM CHLORIDE: 9 INJECTION, SOLUTION INTRAVENOUS at 09:54

## 2022-02-17 ASSESSMENT — PAIN SCALES - GENERAL
PAINLEVEL_OUTOF10: 10
PAINLEVEL_OUTOF10: 2

## 2022-02-17 NOTE — ED PROVIDER NOTES
Department of Emergency Medicine   ED  Provider Note  Admit Date/RoomTime: 2/17/2022  9:11 AM  ED Room: 07/07          History of Present Illness:  2/17/22, Time: 3:50 PM EST  Chief Complaint   Patient presents with    Abdominal Pain     EPIGASTRIC PAIN RADIATIING INTO HER BACK, WAS SEEN LAST WEEK DX WITH TIA AND STARTED ON PLAVIX                Elvira Louis is a 80 y.o. female presenting to the ED for abdominal pain. Patient's had epigastric abdominal pain radiating to her back since last night. Came on gradually, nothing makes it better or worse, burning sensation that does not rating her else. She is not had this before. States that she was really started on Lipitor after she had a TIA. She feels the Lipitor is affecting her stomach. She denies any neck pain or stiffness. She denies any change in bowel or bladder. She denies any fever, chills, nausea, vomiting, paresthesias, lethargy, or any other symptoms or complaints. Review of Systems:   Pertinent positives and negatives are stated within HPI, all other systems reviewed and are negative.        --------------------------------------------- PAST HISTORY ---------------------------------------------  Past Medical History:  has a past medical history of Acid reflux disease, Arthritis, Chronic back pain, COPD (chronic obstructive pulmonary disease) ([facility] [address] Hearing loss, Hyperlipidemia, Irritable bowel, Meningioma ([facility] [address] Migraine, Neuropathy, Osteoarthritis, Osteoporosis, Thyroid disease, and TIA (transient ischemic attack). Past Surgical History:  has a past surgical history that includes Hysterectomy; Tonsillectomy; Appendectomy; Thyroid surgery; Endoscopy, colon, diagnostic (8/2012); ERCP (10/31/2012); ERCP (12/05/2012); fracture surgery; eye surgery; Cholecystectomy; Fixation Kyphoplasty (08/03/2016); Breast surgery; Colonoscopy; Ankle surgery (1980);  Breast biopsy (1978); pr office/outpt visit,procedure only (N/A, 8/1/2018); and blepharoplasty. Social History:  reports that she quit smoking about 26 years ago. Her smoking use included cigarettes. She started smoking about 62 years ago. She smoked 1.00 pack per day. She has never used smokeless tobacco. She reports that she does not drink alcohol and does not use drugs. Family History: family history includes Cancer in her brother, brother, and mother; Emphysema in her father; Heart Disease in her brother; Rheum Arthritis in her sister. . Unless otherwise noted, family history is non contributory    The patients home medications have been reviewed. Allergies: Tramadol, Amlodipine besylate, Bentyl [dicyclomine hcl], Codeine, Lactose intolerance (gi), Mobic [meloxicam], Monosodium glutamate, Other, Pantoprazole, Prednisone, and Talwin [pentazocine]        ---------------------------------------------------PHYSICAL EXAM--------------------------------------    Constitutional/General: Alert and oriented x3  Head: Normocephalic and atraumatic  Eyes: PERRL, EOMI, sclera non icteric  Mouth: Oropharynx clear, handling secretions, no trismus, no asymmetry of the posterior oropharynx or uvular edema  Neck: Supple, full ROM, no stridor, no meningeal signs  Respiratory: Lungs clear to auscultation bilaterally, no wheezes, rales, or rhonchi. Not in respiratory distress  Cardiovascular:  Regular rate. Regular rhythm. 2+ distal pulses. Equal extremity pulses. Chest: No chest wall tenderness  GI:  Abdomen Soft, with mild epigastric tenderness, no guarding rebound, bowel sounds normal  Musculoskeletal: Moves all extremities x 4. Warm and well perfused, no clubbing, cyanosis, or edema. Capillary refill <3 seconds  Integument: skin warm and dry. No rashes.    Neurologic: GCS 15, no focal deficits, symmetric strength 5/5 in the upper and lower extremities bilaterally  Psychiatric: Normal Affect          -------------------------------------------------- RESULTS -------------------------------------------------  I have personally reviewed all laboratory and imaging results for this patient. Results are listed below.      LABS: (Lab results interpreted by me)  Results for orders placed or performed during the hospital encounter of 02/17/22   CBC with Auto Differential   Result Value Ref Range    WBC 8.0 4.5 - 11.5 E9/L    RBC 3.91 3.50 - 5.50 E12/L    Hemoglobin 12.2 11.5 - 15.5 g/dL    Hematocrit 37.8 34.0 - 48.0 %    MCV 96.7 80.0 - 99.9 fL    MCH 31.2 26.0 - 35.0 pg    MCHC 32.3 32.0 - 34.5 %    RDW 12.3 11.5 - 15.0 fL    Platelets 494 202 - 971 E9/L    MPV 9.3 7.0 - 12.0 fL    Neutrophils % 81.3 (H) 43.0 - 80.0 %    Immature Granulocytes % 0.5 0.0 - 5.0 %    Lymphocytes % 13.0 (L) 20.0 - 42.0 %    Monocytes % 4.0 2.0 - 12.0 %    Eosinophils % 0.5 0.0 - 6.0 %    Basophils % 0.7 0.0 - 2.0 %    Neutrophils Absolute 6.51 1.80 - 7.30 E9/L    Immature Granulocytes # 0.04 E9/L    Lymphocytes Absolute 1.04 (L) 1.50 - 4.00 E9/L    Monocytes Absolute 0.32 0.10 - 0.95 E9/L    Eosinophils Absolute 0.04 (L) 0.05 - 0.50 E9/L    Basophils Absolute 0.06 0.00 - 0.20 E9/L   Comprehensive Metabolic Panel   Result Value Ref Range    Sodium 138 132 - 146 mmol/L    Potassium 3.6 3.5 - 5.0 mmol/L    Chloride 107 98 - 107 mmol/L    CO2 20 (L) 22 - 29 mmol/L    Anion Gap 11 7 - 16 mmol/L    Glucose 93 74 - 99 mg/dL    BUN 8 6 - 23 mg/dL    CREATININE 0.6 0.5 - 1.0 mg/dL    GFR Non-African American >60 >=60 mL/min/1.73    GFR African American >60     Calcium 7.8 (L) 8.6 - 10.2 mg/dL    Total Protein 5.8 (L) 6.4 - 8.3 g/dL    Albumin 3.6 3.5 - 5.2 g/dL    Total Bilirubin 0.3 0.0 - 1.2 mg/dL    Alkaline Phosphatase 65 35 - 104 U/L    ALT 8 0 - 32 U/L    AST 13 0 - 31 U/L   Lipase   Result Value Ref Range    Lipase 17 13 - 60 U/L   Lactic Acid   Result Value Ref Range    Lactic Acid 1.6 0.5 - 2.2 mmol/L   Troponin   Result Value Ref Range    Troponin, High Sensitivity 11 (H) 0 - 9 ng/L   Urinalysis Result Value Ref Range    Color, UA Yellow Straw/Yellow    Clarity, UA Clear Clear    Glucose, Ur Negative Negative mg/dL    Bilirubin Urine Negative Negative    Ketones, Urine Negative Negative mg/dL    Specific Gravity, UA 1.015 1.005 - 1.030    Blood, Urine Negative Negative    pH, UA 8.5 5.0 - 9.0    Protein, UA Negative Negative mg/dL    Urobilinogen, Urine 0.2 <2.0 E.U./dL    Nitrite, Urine Negative Negative    Leukocyte Esterase, Urine Negative Negative   SPECIMEN REJECTION   Result Value Ref Range    Rejected Test trp5     Reason for Rejection see below    Troponin   Result Value Ref Range    Troponin, High Sensitivity 13 (H) 0 - 9 ng/L   ,       RADIOLOGY:  Interpreted by Radiologist unless otherwise specified  CT ABDOMEN PELVIS WO CONTRAST Additional Contrast? None   Final Result   No acute findings in the abdomen or pelvis. 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 Distended bladder with multiple bladder diverticula. Pneumobilia is likely postprocedural.      Uncomplicated sigmoid diverticulosis again demonstrated. RECOMMENDATIONS:   Unavailable         XR CHEST PORTABLE   Final Result   1. Chronic changes in the chest.  No acute pneumonia detected. EKG Interpretation  Interpreted by emergency department physician, Dr. Arias Records     Sinus rhythm, rate 77, no STEMI, no significant change when compared to previous study      ------------------------- NURSING NOTES AND VITALS REVIEWED ---------------------------   The nursing notes within the ED encounter and vital signs as below have been reviewed by myself  BP (!) 149/78   Pulse 76   Temp 98.6 °F (37 °C) (Oral)   Resp 16   Ht 5' 1\" (1.549 m)   Wt 112 lb (50.8 kg)   SpO2 96%   BMI 21.16 kg/m²     Oxygen Saturation Interpretation: Normal    The patients available past medical records and past encounters were reviewed.         ------------------------------ ED COURSE/MEDICAL DECISION MAKING----------------------  Medications   0.9 % sodium chloride infusion ( IntraVENous Rate/Dose Verify 2/17/22 1345)   aluminum & magnesium hydroxide-simethicone (MAALOX) 30 mL, lidocaine viscous hcl (XYLOCAINE) 5 mL (GI COCKTAIL) ( Oral Given 2/17/22 1225)   sucralfate (CARAFATE) tablet 1 g (1 g Oral Given 2/17/22 1225)   fentaNYL (SUBLIMAZE) 100 MCG/2ML injection (50 mcg  Given 2/17/22 1520)           The cardiac monitor revealed sinus with a heart rate in the 80s as interpreted by me. The cardiac monitor was ordered secondary to the patient's ab pain and to monitor the patient for dysrhythmia. CPT [id number]         Medical Decision Making:    Patient medicated as above. She did not have an acute abdomen on arrival.  Labs and imaging reviewed. On reevaluation, she is resting comfortably. Repeat abdominal examination at 3:30 PM does not indicate a surgical, pain is resolving. Given this, along with his findings, did not feel that further emergent evaluation was indicated. Patient was discharged. Patient is to follow-up with the PCP in 1 to 2 days. Patient is to have a repeat abdominal examination on the emergent basis if new or worsening symptoms arise. Counseling: The emergency provider has spoken with the patient and discussed todays results, in addition to providing specific details for the plan of care and counseling regarding the diagnosis and prognosis. Questions are answered at this time and they are agreeable with the plan.       --------------------------------- IMPRESSION AND DISPOSITION ---------------------------------    IMPRESSION  1. Abdominal pain, unspecified abdominal location        DISPOSITION  Disposition: Discharge to home  Patient condition is stable        NOTE: This report was transcribed using voice recognition software.  Every effort was made to ensure accuracy; however, inadvertent computerized transcription errors may be present       Ingris Thornton MD  02/17/22 87[address] [facility]

## 2022-02-17 NOTE — ED NOTES
Bed: 07  Expected date: 2/17/22  Expected time:   Means of arrival: Ambulance  Comments:  LANES Rickey Pier, RN  02/17/22 [id number]

## 2022-02-19 LAB
EKG ATRIAL RATE: 77 BPM
EKG P AXIS: 44 DEGREES
EKG P-R INTERVAL: 160 MS
EKG Q-T INTERVAL: 382 MS
EKG QRS DURATION: 72 MS
EKG QTC CALCULATION (BAZETT): 432 MS
EKG R AXIS: -18 DEGREES
EKG T AXIS: 36 DEGREES
EKG VENTRICULAR RATE: 77 BPM
URINE CULTURE, ROUTINE: NORMAL

## 2022-02-19 PROCEDURE — 93010 ELECTROCARDIOGRAM REPORT: CPT | Performed by: INTERNAL MEDICINE

## 2022-03-03 ENCOUNTER — TELEMEDICINE (OUTPATIENT)
Dept: CARDIOLOGY CLINIC | Age: 87
End: 2022-03-03
Payer: MEDICARE

## 2022-03-03 VITALS
RESPIRATION RATE: 16 BRPM | HEIGHT: 60 IN | HEART RATE: 76 BPM | BODY MASS INDEX: 22.19 KG/M2 | DIASTOLIC BLOOD PRESSURE: 84 MMHG | SYSTOLIC BLOOD PRESSURE: 132 MMHG | WEIGHT: 113 LBS

## 2022-03-03 DIAGNOSIS — Q21.12 PFO (PATENT FORAMEN OVALE): ICD-10-CM

## 2022-03-03 DIAGNOSIS — E78.49 OTHER HYPERLIPIDEMIA: ICD-10-CM

## 2022-03-03 DIAGNOSIS — I63.9 ACUTE ISCHEMIC STROKE (HCC): ICD-10-CM

## 2022-03-03 DIAGNOSIS — R07.89 OTHER CHEST PAIN: Primary | ICD-10-CM

## 2022-03-03 PROCEDURE — 99214 OFFICE O/P EST MOD 30 MIN: CPT | Performed by: INTERNAL MEDICINE

## 2022-03-03 PROCEDURE — 4040F PNEUMOC VAC/ADMIN/RCVD: CPT | Performed by: INTERNAL MEDICINE

## 2022-03-03 PROCEDURE — G8420 CALC BMI NORM PARAMETERS: HCPCS | Performed by: INTERNAL MEDICINE

## 2022-03-03 PROCEDURE — G8484 FLU IMMUNIZE NO ADMIN: HCPCS | Performed by: INTERNAL MEDICINE

## 2022-03-03 PROCEDURE — 1111F DSCHRG MED/CURRENT MED MERGE: CPT | Performed by: INTERNAL MEDICINE

## 2022-03-03 PROCEDURE — 1090F PRES/ABSN URINE INCON ASSESS: CPT | Performed by: INTERNAL MEDICINE

## 2022-03-03 PROCEDURE — 1036F TOBACCO NON-USER: CPT | Performed by: INTERNAL MEDICINE

## 2022-03-03 PROCEDURE — 1123F ACP DISCUSS/DSCN MKR DOCD: CPT | Performed by: INTERNAL MEDICINE

## 2022-03-03 PROCEDURE — G8428 CUR MEDS NOT DOCUMENT: HCPCS | Performed by: INTERNAL MEDICINE

## 2022-03-03 RX ORDER — HYDROCODONE BITARTRATE AND ACETAMINOPHEN 5; 325 MG/1; MG/1
TABLET ORAL
COMMUNITY
Start: 2022-02-25 | End: 2022-08-23

## 2022-03-03 RX ORDER — BACLOFEN 10 MG/1
TABLET ORAL
COMMUNITY
Start: 2022-02-18

## 2022-03-03 NOTE — PROGRESS NOTES
CHIEF COMPLAINT: No chief complaint on file. TeleMedicine Video Visit    Manuel Copeland, was evaluated through a synchronous (real-time) audio-video encounter. The patient (or guardian if applicable) is aware that this is a billable service. Verbal consent to proceed has been obtained within the past 12 months. The visit was conducted pursuant to the emergency declaration under the [facility], [facility] and the [employer] Act. Patient identification was verified, and a caregiver was present when appropriate. The patient was located in a state where the provider was credentialed to provide care. Patient identification was verified at the start of the visit, including the patient's telephone number and physical location. I discussed with the patient the nature of our telehealth visits, that:     1. Due to the nature of an audio- video modality, the only components of a physical exam that could be done are the elements supported by direct observation. 2. I would evaluate the patient and recommend diagnostics and treatments based on my assessment. 3. If it was felt that the patient should be evaluated in clinic or an emergency room setting, then they would be directed there. 4. Our sessions are not being recorded and that personal health information is protected. 5. Our team would provide follow up care in person if/when the patient needs it. Patient's location: home address in [facility]  Physician  location other address in [facility] other people involved in call  Daughter    This visit was completed virtually using Doxy. me          HISTORY OF PRESENT ILLNESS: Patient is a 80 y.o. female seen at the request of Alicia Hendrickson MD to establish care with me.  She used to follow with Dr. Jose Carlos Orourke in the past   This pleasant, 80year-old female has a history of mild AI, hypertension, dyslipidemia, hypothyroidism. She also had a meningioma for which she follows with Dr. aVleria Hess. At today's office visit, She denies any shortness of breath, palpitations, dizziness, pedal edema. The patient is capable of activities of daily living. There is dyspnea on more than moderate exertion. There is no orthopnea or PND. She is compliant with medications as well as diet and exercise regimen. She complains of left-sided pain which is worse on coughing and movement. She was evaluated by her PCP and is scheduled to undergo physical therapy for the same. There is no exertional component to it. She does not have dyspnea on exertion. She has unsteady gait and ambulates with a walker. Prior Cardiac workup:  Echocardiogram from 2/9/2022: Small PFO. EF 70%. Grade 1 diastolic dysfunction PASP is 34 mmHg. 1. Lexiscan MPS, 8/2011. Normal perfusion.  EF 70%  Lexiscan MPI from 9/25/2017: No defects. EF 77%  2.  Echocardiogram 06/01/2018.  EF 65%.  Stage I diastolic dysfunction.  Normal left atrium.  Mild AR.  Normal estimated PA pressures.           Past Medical History:   Diagnosis Date    Acid reflux disease     Arthritis     Chronic back pain     [facility])     Hearing loss     Hyperlipidemia     Irritable bowel     Meningioma ([facility]) 05/2018    Migraine     Neuropathy     Osteoarthritis     Osteoporosis     Thyroid disease     hypo    TIA (transient ischemic attack) 05/2018       Allergies   Allergen Reactions    Tramadol Other (See Comments)     Diaphoresis, drop in B/P, weakness    Amlodipine Besylate     Bentyl [Dicyclomine Hcl]     Codeine     Lactose Intolerance (Gi)     Mobic [Meloxicam]     Monosodium Glutamate     Other      Artificial Sweetners    Pantoprazole     Prednisone     Talwin [Pentazocine]        Current Outpatient Medications   Medication Sig Dispense Refill    baclofen (LIORESAL) 10 MG tablet TAKE ONE TABLET BY MOUTH EVERY 12 HOURS  HYDROcodone-acetaminophen (NORCO) 5-325 MG per tablet TAKE ONE TABLET BY MOUTH EVERY 8 HOURS AS NEEDED FOR PAIN for 2 weeks      polyethylene glycol (GLYCOLAX) 17 g packet Take 17 g by mouth every other day      magnesium hydroxide (MILK OF MAGNESIA) 400 MG/5ML suspension Take 10 mLs by mouth every other day      atorvastatin (LIPITOR) 20 MG tablet Take 1 tablet by mouth nightly (Patient not taking: Reported on 3/3/2022) 30 tablet 0    clopidogrel (PLAVIX) 75 MG tablet Take 1 tablet by mouth daily 30 tablet 0    albuterol sulfate  (90 Base) MCG/ACT inhaler Inhale 2 puffs into the lungs every 6 hours as needed for Wheezing or Shortness of Breath       SUMAtriptan (IMITREX) 100 MG tablet Take 100 mg by mouth once as needed for Migraine       Cholecalciferol (VITAMIN D3) 50 MCG (2000 UT) TABS Take 2,000 Units by mouth daily       aspirin 81 MG EC tablet Take 1 tablet by mouth daily 30 tablet 3    acetaminophen (TYLENOL) 500 MG tablet Take 1,000 mg by mouth every 6 hours as needed for Pain Indications: take with tramadol for more severe pain       dexlansoprazole (DEXILANT) 60 MG CPDR capsule Take 60 mg by mouth daily       budesonide (PULMICORT) 180 MCG/ACT AEPB inhaler Inhale 2 puffs into the lungs daily       levothyroxine (SYNTHROID) 50 MCG tablet Take 50 mcg by mouth Six times weekly Given Monday,Tuesday,Wednesday,,Friday,Saturday       No current facility-administered medications for this visit. Social History     Socioeconomic History    Marital status:       Spouse name: Not on file    Number of children: Not on file    Years of education: Not on file    Highest education level: Not on file   Occupational History    Not on file   Tobacco Use    Smoking status: Former Smoker     Packs/day: 1.00     Types: Cigarettes     Start date: 1959     Quit date: 1996     Years since quittin.1    Smokeless tobacco: Never Used   Vaping Use    Vaping Use: Never used   Substance and Sexual Activity    Alcohol use: Never    Drug use: Never    Sexual activity: Never     Birth control/protection: Surgical     Comment: Hysterectomy   Other Topics Concern    Not on file   Social History Narrative    6 cups coffee daily     Social Determinants of Health     Financial Resource Strain:     Difficulty of Paying Living Expenses: Not on file   Food Insecurity:     Worried About Running Out of Food in the Last Year: Not on file    Jaswinder of Food in the Last Year: Not on file   Transportation Needs:     Lack of Transportation (Medical): Not on file    Lack of Transportation (Non-Medical): Not on file   Physical Activity:     Days of Exercise per Week: Not on file    Minutes of Exercise per Session: Not on file   Stress:     Feeling of Stress : Not on file   Social Connections:     Frequency of Communication with Friends and Family: Not on file    Frequency of Social Gatherings with Friends and Family: Not on file    Attends Gnosticism Services: Not on file    Active Member of Clubs or Organizations: Not on file    Attends Club or Organization Meetings: Not on file    Marital Status: Not on file   Intimate Partner Violence:     Fear of Current or Ex-Partner: Not on file    Emotionally Abused: Not on file    Physically Abused: Not on file    Sexually Abused: Not on file   Housing Stability:     Unable to Pay for Housing in the Last Year: Not on file    Number of [place] in the Last Year: Not on file    Unstable Housing in the Last Year: Not on file       Family History   Problem Relation Age of Onset    Cancer Mother         leukemia    Emphysema Father     Rheum Arthritis Sister     Cancer Brother     Cancer Brother     Heart Disease Brother        Review of Systems  Constitutional: Negative for fever, malaise/fatigue and weight loss. HENT: Negative for sore throat and tinnitus. Eyes: Negative for blurred vision and double vision.   Respiratory: Negative for shortness of breath. Negative for cough and wheezing. Cardiovascular: As mentioned in HPI. Gastrointestinal: Negative for abdominal pain, heartburn, nausea and vomiting. Genitourinary: Negative. Musculoskeletal: Negative for back pain, joint pain and myalgias. Neurological: Negative for dizziness, tremors, loss of consciousness and headaches. Endo/Heme/Allergies: Negative. Psychiatric/Behavioral: Negative for depression and suicidal ideas. Physical examination  Blood pressures have been at goal at home. Constitutional: Oriented to person, place, and time. Well-developed and well-nourished. No distress. Head: Normocephalic and atraumatic. Neck: Normal range of motion. Neck supple. Neurological: Alert and oriented to person, place, and time. Psychiatric: Normal mood and affect. Behavior is normal.     ASSESSMENT:   Diagnosis Orders   1. Other chest pain     2. PFO (patent foramen ovale)     3. Other hyperlipidemia     4. Acute ischemic stroke ([facility] [address]          Plan:  1. Atypical angina: Her pain features are atypical in nature. They are musculoskeletal.  She is undergoing physical therapy. I will continue to monitor her. No need for ischemic work-up at this time. 2.  Recurrent TIA with a PFO. She is currently on dual antiplatelet therapy with aspirin and Plavix. She was on Lipitor 20 mg daily but discontinued it when she started having chest pain. I have asked her to consider starting at half dose and titrating up to full dose as discontinuation of Lipitor did not help with her pain. Lipid panel from 2/8/2022: 198/134/62/115. LDL is above goal.  Dietary counseling provided. Her last TIA was 2 years ago. Given her advanced age, they may not be much benefit from a PFO closure. We will continue to monitor her. 3.  Blood pressures have been at goal in the office today.     She will follow up with me in the office in 6 months         Mitchel Beebe MD  [facility]) Cardiology     NOTE: This report was transcribed using voice recognition software. Every effort was made to ensure accuracy; however, inadvertent computerized transcription errors may be present.

## 2022-03-11 ENCOUNTER — OFFICE VISIT (OUTPATIENT)
Dept: NEUROLOGY | Age: 87
End: 2022-03-11
Payer: MEDICARE

## 2022-03-11 VITALS
HEART RATE: 78 BPM | SYSTOLIC BLOOD PRESSURE: 143 MMHG | OXYGEN SATURATION: 98 % | HEIGHT: 60 IN | TEMPERATURE: 98.2 F | RESPIRATION RATE: 18 BRPM | WEIGHT: 112 LBS | BODY MASS INDEX: 21.99 KG/M2 | DIASTOLIC BLOOD PRESSURE: 80 MMHG

## 2022-03-11 DIAGNOSIS — R29.898 LEFT LEG WEAKNESS: Primary | ICD-10-CM

## 2022-03-11 PROCEDURE — 4040F PNEUMOC VAC/ADMIN/RCVD: CPT | Performed by: NURSE PRACTITIONER

## 2022-03-11 PROCEDURE — 1090F PRES/ABSN URINE INCON ASSESS: CPT | Performed by: NURSE PRACTITIONER

## 2022-03-11 PROCEDURE — 1036F TOBACCO NON-USER: CPT | Performed by: NURSE PRACTITIONER

## 2022-03-11 PROCEDURE — G8420 CALC BMI NORM PARAMETERS: HCPCS | Performed by: NURSE PRACTITIONER

## 2022-03-11 PROCEDURE — 99215 OFFICE O/P EST HI 40 MIN: CPT | Performed by: NURSE PRACTITIONER

## 2022-03-11 PROCEDURE — 1111F DSCHRG MED/CURRENT MED MERGE: CPT | Performed by: NURSE PRACTITIONER

## 2022-03-11 PROCEDURE — G8427 DOCREV CUR MEDS BY ELIG CLIN: HCPCS | Performed by: NURSE PRACTITIONER

## 2022-03-11 PROCEDURE — G8484 FLU IMMUNIZE NO ADMIN: HCPCS | Performed by: NURSE PRACTITIONER

## 2022-03-11 PROCEDURE — 1123F ACP DISCUSS/DSCN MKR DOCD: CPT | Performed by: NURSE PRACTITIONER

## 2022-03-11 RX ORDER — CLOPIDOGREL BISULFATE 75 MG/1
75 TABLET ORAL DAILY
Qty: 30 TABLET | Refills: 0 | Status: SHIPPED
Start: 2022-03-11 | End: 2022-03-11

## 2022-03-11 RX ORDER — CLOPIDOGREL BISULFATE 75 MG/1
75 TABLET ORAL DAILY
Qty: 30 TABLET | Refills: 3 | Status: SHIPPED
Start: 2022-03-11 | End: 2022-08-23

## 2022-03-11 RX ORDER — ROSUVASTATIN CALCIUM 10 MG/1
10 TABLET, COATED ORAL NIGHTLY
Qty: 30 TABLET | Refills: 11 | Status: SHIPPED
Start: 2022-03-11 | End: 2022-08-23

## 2022-03-11 NOTE — PROGRESS NOTES
STROKE CLINIC VISIT     History of Present Illness:    Emily Castro is a 80 y.o. right handed female who presents following recent hospitalization for acute ischemic stroke. She presented to the hospital on 2/6/2022 with sudden onset of left facial numbness, left sided weakness and blurry vision. Upon arrival to the emergency department she was markedly hypertensive 219/99 with NIHSS 3 for left upper and lower extremity drift and sensory deficit. Neuroimaging was unremarkable for acute intracranial hemorrhage, large vessel occlusion or large perfusion deficit. Telestroke was consulted and recommend tPA administration. She noticed an improvement her symptoms following medication. MRI of the brain revealed no acute infarct but demonstrated known meningioma for which she follows with neurosurgery. CTA head/neck showed no hemodynamically significant stenosis. KATIANA showed an EF of 70% with right-to-left shunting suggestive of PFO. She was initiated on dual antiplatelet therapy, of note she was previously on aspirin daily prior to hospitalization. She was started on atorvastatin 20 mg daily for . She was discharged home on 2/10/2022 without any identified therapy needs. She represented to the emergency department on 2/17/2022 with epigastric pain radiating into her back. She underwent CT of the abdomen without evidence of acute findings. LFTs within normal. Her pain resolved and she was released to home. She has since stopped her atorvastatin secondary to suspected abdominal pain as side effect of medication. She notes improvement in abdominal discomfort. She reports history of GI issues with IBS and chronic pains. She has since completed Plavix prescription and remains on enteric coated aspirin without any reported bleeding complications. She reports previous history of TIA in 2019. She has family history of hyperlipidemia.     Today, she reports left leg weakness and heaviness for approximately the last week or two. She followed with her primary care provider who noted the weakness and recommended home therapy. She has since had home physical therapy working with her as she drags her left leg. She denies any tripping or falls. She uses wheeled walker at baseline. She notes occasional double vision. She wears glasses at baseline. She followed with cardiology last week regarding atypical angina and TIA with PFO. There is no planned intervention for PFO as likely no benefit to closure given her age. Current Functional Status (Modified Harnett Scale):   2=Slight disability; unable to carry out all previous activities, but able to look after own affairs without assistance    Past Medical History:         Diagnosis Date    Acid reflux disease     Arthritis     Chronic back pain     [facility] disease) ([facility] [address]     Hearing loss     Hyperlipidemia     Irritable bowel     Meningioma ([facility] [address] 05/2018    Migraine     Neuropathy     Osteoarthritis     Osteoporosis     Thyroid disease     hypo    TIA (transient ischemic attack) 05/2018        Past Surgical History:         Procedure Laterality Date    ANKLE SURGERY  1980    pin placement    APPENDECTOMY      BLEPHAROPLASTY      BREAST BIOPSY  1978    BREAST SURGERY      biopsy bilat     CHOLECYSTECTOMY      COLONOSCOPY      ENDOSCOPY, COLON, DIAGNOSTIC  8/2012    ERCP  10/31/2012    ERCP  12/05/2012    stent removal    EYE SURGERY      cataract    FIXATION KYPHOPLASTY  08/03/2016    LUMBAR 2    FRACTURE SURGERY      left ankle, pin placement    HYSTERECTOMY      MN OFFICE/OUTPT VISIT,PROCEDURE ONLY N/A 8/1/2018    KYPHOPLASTY  T11  -- TIME UNDETERMINED performed by Elizabeth Frias DO at [facility] [place] [place]    THYROID SURGERY      biopsy    TONSILLECTOMY          Family History:         Problem Relation Age of Onset    Cancer Mother         leukemia    Emphysema Father     Rheum Arthritis Sister     Cancer Brother     Cancer Brother     Heart Disease Brother        Review of Systems:     No chest pain, palpitations or shortness of breath  No vertigo, lightheadedness or loss of consciousness  No falls, tripping or stumbling  No incontinence of bowels or bladder  + numbness, tingling + focal arm/leg weakness  No swallowing or speech difficulties  No blurred vision, double vision or vision loss     ROS otherwise negative    Objective:     BP (!) 143/80   Pulse 78   Temp 98.2 °F (36.8 °C) (Infrared)   Resp 18   Ht 5' (1.524 m)   Wt 112 lb (50.8 kg)   SpO2 98%   BMI 21.87 kg/m²     General Appearance: alert, cooperative, no distress, appears stated age    Head: normocephalic, without obvious abnormality, atraumatic    Eyes: conjunctiva/corneas clear    Neck: supple, symmetrical, trachea midline, no carotid bruit    Lungs: clear to auscultation bilaterally, respirations unlabored    Heart: regular rate and rhythm   Extremities: normal, atraumatic, no cyanosis or edema   Pulses: 2+ and symmetric all extremities   Skin: color, texture and turgor normal, no rashes or lesions     Mental Status: alert and oriented, thought content appropriate  Speech: no dysarthria  Language: no aphasia    Cranial Nerves:  I: smell NA   II: visual acuity  NA   II: visual fields Full    II: pupils Equal and reactive    III,VII: ptosis None   III,IV,VI: extraocular muscles  Full ROM without nystagmus   V: mastication Normal   V: facial light touch sensation  Normal   V,VII: corneal reflex     VII: facial muscle function - upper  Normal   VII: facial muscle function - lower Normal   VIII: hearing Normal   IX: soft palate elevation  Normal   IX,X: gag reflex    XI: trapezius strength  5/5   XI: sternocleidomastoid strength 5/5   XI: neck extension strength  5/5   XII: tongue strength  Normal     Motor:  RLE 4/5, LLE -4/5  No abnormal movements  No drift   Normal bulk and tone     Sensory:  Light touch normal   Left leg less to pin prick and vibration     Coordination: FN, FFM and TRAVIS symmetric  HS normal    Gait:  Normal    DTR:   Right Brachioradialis reflex 1+  Left Brachioradialis reflex 1+  Right Biceps reflex 1+  Left Biceps reflex 1+  Right Quadriceps reflex 1+  Left Quadriceps reflex 1+  No Sam's  No other pathological reflexes      Laboratory/Radiology:     CBC:   Lab Results   Component Value Date    WBC 8.0 02/17/2022    RBC 3.91 02/17/2022    HGB 12.2 02/17/2022    HCT 37.8 02/17/2022    MCV 96.7 02/17/2022    MCH 31.2 02/17/2022    MCHC 32.3 02/17/2022    RDW 12.3 02/17/2022     02/17/2022    MPV 9.3 02/17/2022     CMP:    Lab Results   Component Value Date     02/17/2022    K 3.6 02/17/2022    K 3.9 05/04/2021     02/17/2022    CO2 20 02/17/2022    BUN 8 02/17/2022    CREATININE 0.6 02/17/2022    GFRAA >60 02/17/2022    LABGLOM >60 02/17/2022    GLUCOSE 93 02/17/2022    PROT 5.8 02/17/2022    LABALBU 3.6 02/17/2022    CALCIUM 7.8 02/17/2022    BILITOT 0.3 02/17/2022    ALKPHOS 65 02/17/2022    AST 13 02/17/2022    ALT 8 02/17/2022     HgBA1c:    Lab Results   Component Value Date    LABA1C 5.2 02/08/2022     FLP:    Lab Results   Component Value Date    TRIG 103 02/08/2022    HDL 62 02/08/2022    LDLCALC 115 02/08/2022    LABVLDL 21 02/08/2022     CTA head/neck 2/6/2022  1. Estimated stenosis of the proximal right and left internal carotid   artery by NASCET criteria is not hemodynamically significant   2. Moderate atherosclerotic disease . 3. No large vessel occlusion identified       CT brain perfusion 2/6/2022  No significant ischemic penumbra identified       This study was analyzed by the V[employer] ai algorithm.       MRI brain without contrast 2/7/2022  No acute intracranial abnormality.       Incidental 1.6 cm meningioma within the left side of the posterior fossa   lateral to the left cerebellar hemisphere.       Moderate chronic microvascular disease within the periventricular white   matter.      US bilateral lower extremities 2/9/2022  Within the visualized vessels there is no evidence for deep venous   thrombosis     Transthoracic echocardiogram  Summary  No intra cardiac mass or thrombus. Agitated saline injection showed some right-to-left shunt suggestive of small Patent foramen ovale. Normal left ventricular chamber size. Normal left ventricular systolic function, LVEF 13%. Mild left ventricular concentric hypertrophy noted. Stage I diastolic dysfunction. Interatrial septum not well visualized. Normal right ventricle size and function. No mitral valve prolapse. The aortic valve appears mildly, moderately sclerotic. No hemodynamically significant aortic stenosis - mean gradient 6mmHg. There is trace aortic regurgitation. There is trace to small tricuspid regurgitation, RVSP 34mmHg. Normal aortic root size. No evidence of pericardial effusion. Compared to prior echo from 6/1/2018. * Images and labs personally reviewed at the time of this office visit    Assessment/Plan     Acute ischemic stroke producing left sided hemiparesis, sensory changes and visual disturbances aborted by IV thrombolytics versus TIA. Suspect internal capsule versus brainstem involvement with etiology likely secondary to small vessel disease given these distributions. She has vascular risk factors of dyslipidemia, hypertension, advanced age and family history. Will transition to Plavix monotherapy instead of aspirin as she was on aspirin prior to this event. Unclear the onset of left leg weakness and reported heaviness as she feels these symptoms are new since hospital discharge with her previous left sided deficits resolving. Will obtain CT of the head to evaluate for new event. On exam, she has bilateral lower extremity weakness, left greater than right and endorses left leg heaviness. She has decreased sensation to pinprick and vibration to left lower extremity.      Risk Factor Modification   Hypertension: controlled today  Dyslipidemia, goal LDL less than 70: , continue statin therapy as tolerated. She is agreeable to trying rosuvastatin at moderate intensity dosing of 10 mg daily. Advised patient that you can reduce your risk for stroke/TIA by modifying/controlling the risk factors that you have. Patient advised to take the medications as prescribed, which will be detailed in the discharge instructions, and to not stop taking them without consulting their physician. In addition, pt. advised to maintain a healthy diet, exercise regularly and to not smoke. Reviewed signs and symptoms of stroke including B.E.F.A.S. T:   Balance: Does the person have a sudden loss of balance? Eyes: Has the person lost vision in one or both eyes? Face: Does the person's face look uneven? Arm: Is one arm weaker or numb? Speech: Is the person's speech slurred? Does the person have trouble speaking or seen confused? Time: Call 9-1-1 immediately. Follow up with neurology in 3-4 months   Follow up with PCP as planned    NISHANT Cameron - CNP MSN, APRN, FNP-C  11:12 AM  3/11/22    I spent 40 minutes with this patient obtaining the HPI and discussing test results and exam with 50% of the visit counseling the patient and/or family on diagnosis, risk factor modification and our plan of action. All questions were answered prior to leaving my office.

## 2022-03-11 NOTE — DISCHARGE SUMMARY
[address]                  [address] [facility],  [address]                               DISCHARGE SUMMARY    PATIENT NAME: Andreas Conroy                          :        1927  MED REC NO:   [id number]                            ROOM:       8503  ACCOUNT NO:   [de-identified]                           ADMIT DATE: 2022  PROVIDER:     Bernarda Bonilla DO                  DISCHARGE DATE:  02/10/2022    DISCHARGE DIAGNOSES:  Acute CVA, status post tPA; hypertensive crisis;  meningioma; hypothyroid; osteoarthritis; COPD; IBS; GERD, PFO.    HOSPITAL COURSE:  The patient is a 71-year-old  female who  presented to the emergency room complaining acute onset left-sided  weakness, elevated blood pressure. She was seen in the emergency room. She was started on Cardene drip. She was given tPA. She was admitted  to the Neuro ICU. She was seen by Neurology who recommended dual  antiplatelet therapy x3 weeks, then Plavix monotherapy. 2-D echo  revealed PFO with an ejection fraction of 70%. She was seen by  Cardiology. Ultrasound legs were negative for DVT. She was discharged  home in stable condition on 02/10/2022. DISCHARGE MEDICATIONS:  As per discharge med rec which include Lipitor  20 mg daily; Plavix 75 mg daily; Tylenol p.r.n.; albuterol p.r.n.;  aspirin daily x3 weeks, then Plavix monotherapy; Pulmicort inhaler;  Dexilant; Imitrex p.r.n.; Synthroid; vitamin D3. FOLLOWUP:  The patient instructed to follow up with Neurology and  Cardiology, call office for appointment. Follow up with Dr. Natty Pan, call office for appointment.           Maralyn Halsted, DO    D: 03/10/2022 11:44:31       T: 03/10/2022 11:47:19     [id number]  Job#: [id number]     Doc#: [id number]    CC:

## 2022-03-27 ENCOUNTER — HOSPITAL ENCOUNTER (OUTPATIENT)
Dept: CT IMAGING | Age: 87
Discharge: HOME | End: 2022-03-29
Payer: MEDICARE

## 2022-03-27 DIAGNOSIS — R29.898 LEFT LEG WEAKNESS: ICD-10-CM

## 2022-03-27 PROCEDURE — 70450 CT HEAD/BRAIN W/O DYE: CPT

## 2022-06-13 ENCOUNTER — OFFICE VISIT (OUTPATIENT)
Dept: NEUROLOGY | Age: 87
End: 2022-06-13
Payer: MEDICARE

## 2022-06-13 VITALS
HEART RATE: 93 BPM | HEIGHT: 60 IN | WEIGHT: 113 LBS | DIASTOLIC BLOOD PRESSURE: 93 MMHG | TEMPERATURE: 97 F | BODY MASS INDEX: 22.19 KG/M2 | OXYGEN SATURATION: 95 % | SYSTOLIC BLOOD PRESSURE: 183 MMHG

## 2022-06-13 DIAGNOSIS — I63.411 CEREBROVASCULAR ACCIDENT (CVA) DUE TO EMBOLISM OF RIGHT MIDDLE CEREBRAL ARTERY (HCC): Primary | ICD-10-CM

## 2022-06-13 PROCEDURE — G8427 DOCREV CUR MEDS BY ELIG CLIN: HCPCS | Performed by: CLINICAL NURSE SPECIALIST

## 2022-06-13 PROCEDURE — 1036F TOBACCO NON-USER: CPT | Performed by: CLINICAL NURSE SPECIALIST

## 2022-06-13 PROCEDURE — 99215 OFFICE O/P EST HI 40 MIN: CPT | Performed by: CLINICAL NURSE SPECIALIST

## 2022-06-13 PROCEDURE — 1123F ACP DISCUSS/DSCN MKR DOCD: CPT | Performed by: CLINICAL NURSE SPECIALIST

## 2022-06-13 PROCEDURE — 1090F PRES/ABSN URINE INCON ASSESS: CPT | Performed by: CLINICAL NURSE SPECIALIST

## 2022-06-13 PROCEDURE — G8420 CALC BMI NORM PARAMETERS: HCPCS | Performed by: CLINICAL NURSE SPECIALIST

## 2022-06-13 RX ORDER — PANTOPRAZOLE SODIUM 40 MG/1
TABLET, DELAYED RELEASE ORAL
COMMUNITY
Start: 2022-05-05

## 2022-06-13 RX ORDER — ASPIRIN 81 MG/1
81 TABLET ORAL DAILY
COMMUNITY

## 2022-06-13 RX ORDER — DOCUSATE SODIUM 100 MG/1
100 CAPSULE, LIQUID FILLED ORAL 2 TIMES DAILY
COMMUNITY

## 2022-08-23 ENCOUNTER — OFFICE VISIT (OUTPATIENT)
Dept: CARDIOLOGY CLINIC | Age: 87
End: 2022-08-23
Payer: MEDICARE

## 2022-08-23 VITALS
HEART RATE: 71 BPM | WEIGHT: 108.2 LBS | DIASTOLIC BLOOD PRESSURE: 80 MMHG | BODY MASS INDEX: 21.24 KG/M2 | RESPIRATION RATE: 18 BRPM | SYSTOLIC BLOOD PRESSURE: 122 MMHG | HEIGHT: 60 IN

## 2022-08-23 DIAGNOSIS — R53.82 CHRONIC FATIGUE: ICD-10-CM

## 2022-08-23 DIAGNOSIS — Q21.12 PFO (PATENT FORAMEN OVALE): Primary | ICD-10-CM

## 2022-08-23 DIAGNOSIS — E78.49 OTHER HYPERLIPIDEMIA: ICD-10-CM

## 2022-08-23 DIAGNOSIS — I63.9 ACUTE ISCHEMIC STROKE (HCC): ICD-10-CM

## 2022-08-23 PROCEDURE — 93000 ELECTROCARDIOGRAM COMPLETE: CPT | Performed by: INTERNAL MEDICINE

## 2022-08-23 PROCEDURE — 99214 OFFICE O/P EST MOD 30 MIN: CPT | Performed by: INTERNAL MEDICINE

## 2022-08-23 PROCEDURE — G8427 DOCREV CUR MEDS BY ELIG CLIN: HCPCS | Performed by: INTERNAL MEDICINE

## 2022-08-23 PROCEDURE — 1090F PRES/ABSN URINE INCON ASSESS: CPT | Performed by: INTERNAL MEDICINE

## 2022-08-23 PROCEDURE — 1123F ACP DISCUSS/DSCN MKR DOCD: CPT | Performed by: INTERNAL MEDICINE

## 2022-08-23 PROCEDURE — 1036F TOBACCO NON-USER: CPT | Performed by: INTERNAL MEDICINE

## 2022-08-23 PROCEDURE — G8420 CALC BMI NORM PARAMETERS: HCPCS | Performed by: INTERNAL MEDICINE

## 2022-08-23 NOTE — PATIENT INSTRUCTIONS
Continue all your medications at current doses. Please check blood work (tsh)  I will give you a handout for healthy diet  Restrict sodium intake to less than 2-2.5 g/day. Restrict fluid intake to less than 2.2 L/day. Goal BP is less than 130/80. Please try to exercise for 150 minutes a week. I will see you back in the office in 6 months. Please call the office at ([phone], option [phone]) if you have any questions.

## 2022-08-23 NOTE — PROGRESS NOTES
CHIEF COMPLAINT:   Chief Complaint   Patient presents with    Chest Pain     OV. F/U. Patient has Sob sometimes. HISTORY OF PRESENT ILLNESS: Patient is a 80 y.o. female seen at the request of Amari Felton MD to establish care with me. She used to follow with Dr. Jorge A Bravo in the past.     This pleasant, 80year-old female has a history of mild AI, hypertension, dyslipidemia, hypothyroidism. She also had a meningioma for which she follows with Dr. Rob Laura. At today's office visit, She denies any shortness of breath, palpitations, dizziness, pedal edema. The patient is capable of activities of daily living. There is dyspnea on more than moderate exertion. There is no orthopnea or PND. She is compliant with medications as well as diet and exercise regimen. She complains of left-sided pain which is worse on coughing and movement. She was evaluated by her PCP and is scheduled to undergo physical therapy for the same. There is no exertional component to it. She does not have dyspnea on exertion. She has unsteady gait and ambulates with a walker. Prior Cardiac workup:  Echocardiogram from 2/9/2022: Small PFO. EF 70%. Grade 1 diastolic dysfunction PASP is 34 mmHg. Lexiscan MPS, 8/2011. Normal perfusion. EF 70%  Lexiscan MPI from 9/25/2017: No defects. EF 77%  2.  Echocardiogram 06/01/2018. EF 65%. Stage I diastolic dysfunction. Normal left atrium. Mild AR. Normal estimated PA pressures.           Past Medical History:   Diagnosis Date    Acid reflux disease     Arthritis     Chronic back pain     COPD (chronic obstructive pulmonary disease) (HCC)     Hearing loss     Hyperlipidemia     Irritable bowel     Meningioma ([facility] [address] 05/2018    Migraine     Neuropathy     Osteoarthritis     Osteoporosis     Thyroid disease     hypo    TIA (transient ischemic attack) 05/2018       Allergies   Allergen Reactions    Tramadol Other (See Comments)     Diaphoresis, drop in B/P, weakness    Amlodipine Besylate     Bentyl [Dicyclomine Hcl]     Codeine     Lactose Intolerance (Gi)     Mobic [Meloxicam]     Monosodium Glutamate     Other      Artificial Sweetners    Pantoprazole     Prednisone     Talwin [Pentazocine]        Current Outpatient Medications   Medication Sig Dispense Refill    aspirin 81 MG EC tablet Take 81 mg by mouth daily      docusate sodium (COLACE) 100 mg capsule Take 100 mg by mouth 2 times daily      magnesium hydroxide (MILK OF MAGNESIA) 400 MG/5ML suspension Take 10 mLs by mouth every other day If needed. albuterol sulfate  (90 Base) MCG/ACT inhaler Inhale 2 puffs into the lungs every 6 hours as needed for Wheezing or Shortness of Breath       Cholecalciferol (VITAMIN D3) 50 MCG (2000 UT) TABS Take 2,000 Units by mouth daily       acetaminophen (TYLENOL) 500 MG tablet Take 1,000 mg by mouth every 6 hours as needed for Pain Indications: take with tramadol for more severe pain       dexlansoprazole (DEXILANT) 60 MG CPDR delayed release capsule Take 60 mg by mouth daily       budesonide (PULMICORT) 180 MCG/ACT AEPB inhaler Inhale 2 puffs into the lungs daily       pantoprazole (PROTONIX) 40 MG tablet TAKE ONE TABLET BY MOUTH EVERY DAY (Patient not taking: Reported on 8/23/2022)      baclofen (LIORESAL) 10 MG tablet TAKE ONE TABLET BY MOUTH EVERY 12 HOURS      polyethylene glycol (GLYCOLAX) 17 g packet Take 17 g by mouth every other day (Patient not taking: Reported on 8/23/2022)      levothyroxine (SYNTHROID) 50 MCG tablet Take 50 mcg by mouth Six times weekly Given Monday,Tuesday,Wednesday,Thurday,Friday,Saturday       No current facility-administered medications for this visit. Social History     Socioeconomic History    Marital status:       Spouse name: Not on file    Number of children: Not on file    Years of education: Not on file    Highest education level: Not on file   Occupational History    Not on file   Tobacco Use    Smoking status: Former     Packs/day: 1.00     Types: Cigarettes     Start date: 1959     Quit date: 1996     Years since quittin.6    Smokeless tobacco: Never   Vaping Use    Vaping Use: Never used   Substance and Sexual Activity    Alcohol use: Never    Drug use: Never    Sexual activity: Never     Birth control/protection: Surgical     Comment: Hysterectomy   Other Topics Concern    Not on file   Social History Narrative    6 cups coffee daily     Social Determinants of Health     Financial Resource Strain: Not on file   Food Insecurity: Not on file   Transportation Needs: Not on file   Physical Activity: Not on file   Stress: Not on file   Social Connections: Not on file   Intimate Partner Violence: Not on file   Housing Stability: Not on file       Family History   Problem Relation Age of Onset    Cancer Mother         leukemia    Emphysema Father     Rheum Arthritis Sister     Cancer Brother     Cancer Brother     Heart Disease Brother        Review of Systems  Constitutional: Negative for fever, malaise/fatigue and weight loss. HENT: Negative for sore throat and tinnitus. Eyes: Negative for blurred vision and double vision. Respiratory: Negative for shortness of breath. Negative for cough and wheezing. Cardiovascular: As mentioned in HPI. Gastrointestinal: Negative for abdominal pain, heartburn, nausea and vomiting. Genitourinary: Negative. Musculoskeletal: Negative for back pain, joint pain and myalgias. Neurological: Negative for dizziness, tremors, loss of consciousness and headaches. Endo/Heme/Allergies: Negative. Psychiatric/Behavioral: Negative for depression and suicidal ideas. Physical Exam   /80   Pulse 71   Resp 18   Ht 5' (1.524 m)   Wt 108 lb 3.2 oz (49.1 kg)   BMI 21.13 kg/m²   Constitutional: Oriented to person, place, and time. Well-developed and well-nourished. No distress. Head: Normocephalic and atraumatic.    Eyes: EOM are normal. Pupils are equal, round, and reactive to light.   Neck: Normal range of motion. Neck supple. No hepatojugular reflux and no JVD present. Carotid bruit is not present. No tracheal deviation present. No thyromegaly present. Cardiovascular: Normal rate, regular rhythm, normal heart sounds and intact distal pulses. Exam reveals no gallop and no friction rub. No murmur heard. Pulmonary/Chest: Effort normal and breath sounds normal. No respiratory distress. No wheezes. No rales. No tenderness. Abdominal: Soft. Bowel sounds are normal. No distension and no mass. No tenderness. No rebound and no guarding. Musculoskeletal: Normal range of motion. No edema and no tenderness. Lymphadenopathy:   No cervical adenopathy. Neurological: Alert and oriented to person, place, and time. Skin: Skin is warm and dry. No rash noted. Not diaphoretic. No erythema. Psychiatric: Normal mood and affect. Behavior is normal.     EKG done in the office today, independently reviewed by me. NSR. LVPL. RSR'. Non specific ST-changes. ASSESSMENT:   Diagnosis Orders   1. Acute ischemic stroke (HCC)  EKG 12 Lead      2. PFO (patent foramen ovale)        3. Other hyperlipidemia        4. Chronic fatigue  TSH             Plan:  1. Recurrent TIA with a PFO. She is currently on dual antiplatelet therapy with aspirin and Plavix. She is currently not taking statin therapy on account of intolerance in the form of myopathy. Lipid panel from 2/8/2022: 198/134/62/115. LDL is above goal.  Dietary counseling provided. Her last TIA was 2 years ago. Given her advanced age, there may not be much benefit from a PFO closure. We will continue to monitor her. 2.  Blood pressures have been at goal in the office today. Currently on no medications. 3.  She is complaining of fatigue that has been going on for a couple of months. She is currently on Synthroid. She states her TSH has not been checked in a while. I will check a TSH. No signs of sleep apnea.   I will also check a KAYLEIGH and a BMP. She will follow up with me in the office in 6 months         [facility], MD  [facility] ([facility]) Cardiology     NOTE: This report was transcribed using voice recognition software. Every effort was made to ensure accuracy; however, inadvertent computerized transcription errors may be present.

## 2022-08-24 ENCOUNTER — TELEPHONE (OUTPATIENT)
Dept: CARDIOLOGY CLINIC | Age: 87
End: 2022-08-24

## 2022-08-24 ENCOUNTER — HOSPITAL ENCOUNTER (OUTPATIENT)
Age: 87
Discharge: HOME | End: 2022-08-24
Payer: MEDICARE

## 2022-08-24 DIAGNOSIS — R53.82 CHRONIC FATIGUE: ICD-10-CM

## 2022-08-24 LAB
ANION GAP SERPL CALCULATED.3IONS-SCNC: 11 MMOL/L (ref 7–16)
BASOPHILS ABSOLUTE: 0.04 E9/L (ref 0–0.2)
BASOPHILS RELATIVE PERCENT: 0.9 % (ref 0–2)
BUN BLDV-MCNC: 13 MG/DL (ref 6–23)
CALCIUM SERPL-MCNC: 9.9 MG/DL (ref 8.6–10.2)
CHLORIDE BLD-SCNC: 99 MMOL/L (ref 98–107)
CO2: 26 MMOL/L (ref 22–29)
CREAT SERPL-MCNC: 0.7 MG/DL (ref 0.5–1)
EOSINOPHILS ABSOLUTE: 0.22 E9/L (ref 0.05–0.5)
EOSINOPHILS RELATIVE PERCENT: 4.8 % (ref 0–6)
GFR AFRICAN AMERICAN: >60
GFR NON-AFRICAN AMERICAN: >60 ML/MIN/1.73
GLUCOSE BLD-MCNC: 101 MG/DL (ref 74–99)
HCT VFR BLD CALC: 40.1 % (ref 34–48)
HEMOGLOBIN: 13 G/DL (ref 11.5–15.5)
IMMATURE GRANULOCYTES #: 0.01 E9/L
IMMATURE GRANULOCYTES %: 0.2 % (ref 0–5)
LYMPHOCYTES ABSOLUTE: 1.56 E9/L (ref 1.5–4)
LYMPHOCYTES RELATIVE PERCENT: 34 % (ref 20–42)
MCH RBC QN AUTO: 31.3 PG (ref 26–35)
MCHC RBC AUTO-ENTMCNC: 32.4 % (ref 32–34.5)
MCV RBC AUTO: 96.6 FL (ref 80–99.9)
MONOCYTES ABSOLUTE: 0.26 E9/L (ref 0.1–0.95)
MONOCYTES RELATIVE PERCENT: 5.7 % (ref 2–12)
NEUTROPHILS ABSOLUTE: 2.5 E9/L (ref 1.8–7.3)
NEUTROPHILS RELATIVE PERCENT: 54.4 % (ref 43–80)
PDW BLD-RTO: 12.4 FL (ref 11.5–15)
PLATELET # BLD: 226 E9/L (ref 130–450)
PMV BLD AUTO: 9.1 FL (ref 7–12)
POTASSIUM SERPL-SCNC: 4.2 MMOL/L (ref 3.5–5)
RBC # BLD: 4.15 E12/L (ref 3.5–5.5)
SODIUM BLD-SCNC: 136 MMOL/L (ref 132–146)
TSH SERPL DL<=0.05 MIU/L-ACNC: 1.43 UIU/ML (ref 0.27–4.2)
WBC # BLD: 4.6 E9/L (ref 4.5–11.5)

## 2022-08-24 PROCEDURE — 84443 ASSAY THYROID STIM HORMONE: CPT

## 2022-08-24 PROCEDURE — 85025 COMPLETE CBC W/AUTO DIFF WBC: CPT

## 2022-08-24 PROCEDURE — 80048 BASIC METABOLIC PNL TOTAL CA: CPT

## 2022-08-24 PROCEDURE — 36415 COLL VENOUS BLD VENIPUNCTURE: CPT

## 2022-08-24 NOTE — TELEPHONE ENCOUNTER
----- Message from Gabbi Ramesh MD sent at 8/24/2022  9:28 AM EDT -----  Please let her know that her blood work including thyroid function levels are all within normal limits.

## 2022-10-26 ENCOUNTER — HOSPITAL ENCOUNTER (OUTPATIENT)
Age: 87
Discharge: HOME | End: 2022-10-26
Payer: MEDICARE

## 2022-10-26 LAB
ALBUMIN SERPL-MCNC: 4.5 G/DL (ref 3.5–5.2)
ALP BLD-CCNC: 86 U/L (ref 35–104)
ALT SERPL-CCNC: 7 U/L (ref 0–32)
AMYLASE: 39 U/L (ref 20–100)
ANION GAP SERPL CALCULATED.3IONS-SCNC: 7 MMOL/L (ref 7–16)
AST SERPL-CCNC: 13 U/L (ref 0–31)
BASOPHILS ABSOLUTE: 0.07 E9/L (ref 0–0.2)
BASOPHILS RELATIVE PERCENT: 1.5 % (ref 0–2)
BILIRUB SERPL-MCNC: 0.4 MG/DL (ref 0–1.2)
BUN BLDV-MCNC: 10 MG/DL (ref 6–23)
CALCIUM SERPL-MCNC: 9.6 MG/DL (ref 8.6–10.2)
CHLORIDE BLD-SCNC: 100 MMOL/L (ref 98–107)
CO2: 25 MMOL/L (ref 22–29)
CREAT SERPL-MCNC: 0.7 MG/DL (ref 0.5–1)
EOSINOPHILS ABSOLUTE: 0.23 E9/L (ref 0.05–0.5)
EOSINOPHILS RELATIVE PERCENT: 4.9 % (ref 0–6)
GFR SERPL CREATININE-BSD FRML MDRD: >60 ML/MIN/1.73
GLUCOSE BLD-MCNC: 93 MG/DL (ref 74–99)
HCT VFR BLD CALC: 37.9 % (ref 34–48)
HEMOGLOBIN: 12.3 G/DL (ref 11.5–15.5)
IMMATURE GRANULOCYTES #: 0 E9/L
IMMATURE GRANULOCYTES %: 0 % (ref 0–5)
LIPASE: 18 U/L (ref 13–60)
LYMPHOCYTES ABSOLUTE: 1.37 E9/L (ref 1.5–4)
LYMPHOCYTES RELATIVE PERCENT: 29.3 % (ref 20–42)
MCH RBC QN AUTO: 31.5 PG (ref 26–35)
MCHC RBC AUTO-ENTMCNC: 32.5 % (ref 32–34.5)
MCV RBC AUTO: 96.9 FL (ref 80–99.9)
MONOCYTES ABSOLUTE: 0.27 E9/L (ref 0.1–0.95)
MONOCYTES RELATIVE PERCENT: 5.8 % (ref 2–12)
NEUTROPHILS ABSOLUTE: 2.74 E9/L (ref 1.8–7.3)
NEUTROPHILS RELATIVE PERCENT: 58.5 % (ref 43–80)
PDW BLD-RTO: 12.6 FL (ref 11.5–15)
PLATELET # BLD: 234 E9/L (ref 130–450)
PMV BLD AUTO: 9 FL (ref 7–12)
POTASSIUM SERPL-SCNC: 4.2 MMOL/L (ref 3.5–5)
RBC # BLD: 3.91 E12/L (ref 3.5–5.5)
SODIUM BLD-SCNC: 132 MMOL/L (ref 132–146)
TOTAL PROTEIN: 6.7 G/DL (ref 6.4–8.3)
WBC # BLD: 4.7 E9/L (ref 4.5–11.5)

## 2022-10-26 PROCEDURE — 36415 COLL VENOUS BLD VENIPUNCTURE: CPT

## 2022-10-26 PROCEDURE — 80053 COMPREHEN METABOLIC PANEL: CPT

## 2022-10-26 PROCEDURE — 85025 COMPLETE CBC W/AUTO DIFF WBC: CPT

## 2022-10-26 PROCEDURE — 82150 ASSAY OF AMYLASE: CPT

## 2022-10-26 PROCEDURE — 83690 ASSAY OF LIPASE: CPT

## 2023-01-29 ENCOUNTER — APPOINTMENT (OUTPATIENT)
Dept: CT IMAGING | Age: 88
DRG: 305 | End: 2023-01-29
Payer: MEDICARE

## 2023-01-29 ENCOUNTER — APPOINTMENT (OUTPATIENT)
Dept: GENERAL RADIOLOGY | Age: 88
DRG: 305 | End: 2023-01-29
Payer: MEDICARE

## 2023-01-29 ENCOUNTER — HOSPITAL ENCOUNTER (INPATIENT)
Age: 88
LOS: 2 days | Discharge: HOME | DRG: 305 | End: 2023-01-31
Attending: EMERGENCY MEDICINE | Admitting: INTERNAL MEDICINE
Payer: MEDICARE

## 2023-01-29 DIAGNOSIS — R29.90 STROKE-LIKE SYMPTOMS: Primary | ICD-10-CM

## 2023-01-29 LAB
ALBUMIN SERPL-MCNC: 4.6 G/DL (ref 3.5–5.2)
ALP BLD-CCNC: 96 U/L (ref 35–104)
ALT SERPL-CCNC: 10 U/L (ref 0–32)
ANION GAP SERPL CALCULATED.3IONS-SCNC: 14 MMOL/L (ref 7–16)
APTT: 35.4 SEC (ref 24.5–35.1)
AST SERPL-CCNC: 18 U/L (ref 0–31)
BASOPHILS ABSOLUTE: 0.05 E9/L (ref 0–0.2)
BASOPHILS RELATIVE PERCENT: 0.8 % (ref 0–2)
BILIRUB SERPL-MCNC: 0.3 MG/DL (ref 0–1.2)
BUN BLDV-MCNC: 14 MG/DL (ref 6–23)
CALCIUM SERPL-MCNC: 9.7 MG/DL (ref 8.6–10.2)
CHLORIDE BLD-SCNC: 97 MMOL/L (ref 98–107)
CHP ED QC CHECK: YES
CO2: 22 MMOL/L (ref 22–29)
CREAT SERPL-MCNC: 0.8 MG/DL (ref 0.5–1)
EOSINOPHILS ABSOLUTE: 0.15 E9/L (ref 0.05–0.5)
EOSINOPHILS RELATIVE PERCENT: 2.4 % (ref 0–6)
GFR SERPL CREATININE-BSD FRML MDRD: >60 ML/MIN/1.73
GLUCOSE BLD-MCNC: 103 MG/DL
GLUCOSE BLD-MCNC: 114 MG/DL (ref 74–99)
HCT VFR BLD CALC: 39.1 % (ref 34–48)
HEMOGLOBIN: 13 G/DL (ref 11.5–15.5)
IMMATURE GRANULOCYTES #: 0.01 E9/L
IMMATURE GRANULOCYTES %: 0.2 % (ref 0–5)
INR BLD: 1.1
LACTIC ACID: 1 MMOL/L (ref 0.5–2.2)
LYMPHOCYTES ABSOLUTE: 2.5 E9/L (ref 1.5–4)
LYMPHOCYTES RELATIVE PERCENT: 40.5 % (ref 20–42)
MCH RBC QN AUTO: 31.6 PG (ref 26–35)
MCHC RBC AUTO-ENTMCNC: 33.2 % (ref 32–34.5)
MCV RBC AUTO: 94.9 FL (ref 80–99.9)
METER GLUCOSE: 103 MG/DL (ref 74–99)
MONOCYTES ABSOLUTE: 0.39 E9/L (ref 0.1–0.95)
MONOCYTES RELATIVE PERCENT: 6.3 % (ref 2–12)
NEUTROPHILS ABSOLUTE: 3.07 E9/L (ref 1.8–7.3)
NEUTROPHILS RELATIVE PERCENT: 49.8 % (ref 43–80)
PDW BLD-RTO: 12.8 FL (ref 11.5–15)
PLATELET # BLD: 220 E9/L (ref 130–450)
PMV BLD AUTO: 9.1 FL (ref 7–12)
POTASSIUM SERPL-SCNC: 4.3 MMOL/L (ref 3.5–5)
PROTHROMBIN TIME: 12.4 SEC (ref 9.3–12.4)
RBC # BLD: 4.12 E12/L (ref 3.5–5.5)
SODIUM BLD-SCNC: 133 MMOL/L (ref 132–146)
TOTAL PROTEIN: 7.3 G/DL (ref 6.4–8.3)
TROPONIN, HIGH SENSITIVITY: 15 NG/L (ref 0–9)
WBC # BLD: 6.2 E9/L (ref 4.5–11.5)

## 2023-01-29 PROCEDURE — 6360000004 HC RX CONTRAST MEDICATION: Performed by: RADIOLOGY

## 2023-01-29 PROCEDURE — 4A03X5D MEASUREMENT OF ARTERIAL FLOW, INTRACRANIAL, EXTERNAL APPROACH: ICD-10-PCS | Performed by: RADIOLOGY

## 2023-01-29 PROCEDURE — 70496 CT ANGIOGRAPHY HEAD: CPT

## 2023-01-29 PROCEDURE — 70496 CT ANGIOGRAPHY HEAD: CPT | Performed by: RADIOLOGY

## 2023-01-29 PROCEDURE — 71045 X-RAY EXAM CHEST 1 VIEW: CPT

## 2023-01-29 PROCEDURE — 80053 COMPREHEN METABOLIC PANEL: CPT

## 2023-01-29 PROCEDURE — 70498 CT ANGIOGRAPHY NECK: CPT

## 2023-01-29 PROCEDURE — 81001 URINALYSIS AUTO W/SCOPE: CPT

## 2023-01-29 PROCEDURE — 70450 CT HEAD/BRAIN W/O DYE: CPT

## 2023-01-29 PROCEDURE — 83605 ASSAY OF LACTIC ACID: CPT

## 2023-01-29 PROCEDURE — 82962 GLUCOSE BLOOD TEST: CPT

## 2023-01-29 PROCEDURE — 85610 PROTHROMBIN TIME: CPT

## 2023-01-29 PROCEDURE — 0042T CT BRAIN PERFUSION: CPT | Performed by: RADIOLOGY

## 2023-01-29 PROCEDURE — 99285 EMERGENCY DEPT VISIT HI MDM: CPT

## 2023-01-29 PROCEDURE — 70450 CT HEAD/BRAIN W/O DYE: CPT | Performed by: RADIOLOGY

## 2023-01-29 PROCEDURE — 2140000000 HC CCU INTERMEDIATE R&B

## 2023-01-29 PROCEDURE — 0042T CT BRAIN PERFUSION: CPT

## 2023-01-29 PROCEDURE — 93005 ELECTROCARDIOGRAM TRACING: CPT | Performed by: STUDENT IN AN ORGANIZED HEALTH CARE EDUCATION/TRAINING PROGRAM

## 2023-01-29 PROCEDURE — 85025 COMPLETE CBC W/AUTO DIFF WBC: CPT

## 2023-01-29 PROCEDURE — 70498 CT ANGIOGRAPHY NECK: CPT | Performed by: RADIOLOGY

## 2023-01-29 PROCEDURE — 84484 ASSAY OF TROPONIN QUANT: CPT

## 2023-01-29 PROCEDURE — 85730 THROMBOPLASTIN TIME PARTIAL: CPT

## 2023-01-29 RX ORDER — ACETAMINOPHEN 325 MG/1
650 TABLET ORAL EVERY 4 HOURS PRN
Status: DISCONTINUED | OUTPATIENT
Start: 2023-01-29 | End: 2023-01-30 | Stop reason: SDUPTHER

## 2023-01-29 RX ORDER — CLOPIDOGREL 300 MG/1
300 TABLET, FILM COATED ORAL ONCE
Status: COMPLETED | OUTPATIENT
Start: 2023-01-29 | End: 2023-01-30

## 2023-01-29 RX ORDER — SODIUM CHLORIDE 9 MG/ML
INJECTION, SOLUTION INTRAVENOUS PRN
Status: DISCONTINUED | OUTPATIENT
Start: 2023-01-29 | End: 2023-01-30 | Stop reason: SDUPTHER

## 2023-01-29 RX ORDER — ONDANSETRON 4 MG/1
4 TABLET, ORALLY DISINTEGRATING ORAL EVERY 8 HOURS PRN
Status: DISCONTINUED | OUTPATIENT
Start: 2023-01-29 | End: 2023-01-31 | Stop reason: HOSPADM

## 2023-01-29 RX ORDER — SODIUM CHLORIDE 0.9 % (FLUSH) 0.9 %
5-40 SYRINGE (ML) INJECTION EVERY 12 HOURS SCHEDULED
Status: DISCONTINUED | OUTPATIENT
Start: 2023-01-29 | End: 2023-01-31 | Stop reason: HOSPADM

## 2023-01-29 RX ORDER — SODIUM CHLORIDE 0.9 % (FLUSH) 0.9 %
5-40 SYRINGE (ML) INJECTION PRN
Status: DISCONTINUED | OUTPATIENT
Start: 2023-01-29 | End: 2023-01-31 | Stop reason: HOSPADM

## 2023-01-29 RX ORDER — ONDANSETRON 2 MG/ML
4 INJECTION INTRAMUSCULAR; INTRAVENOUS EVERY 6 HOURS PRN
Status: DISCONTINUED | OUTPATIENT
Start: 2023-01-29 | End: 2023-01-31 | Stop reason: HOSPADM

## 2023-01-29 RX ADMIN — IOPAMIDOL 100 ML: 755 INJECTION, SOLUTION INTRAVENOUS at 22:23

## 2023-01-29 ASSESSMENT — PAIN DESCRIPTION - DESCRIPTORS: DESCRIPTORS: ACHING

## 2023-01-29 ASSESSMENT — PAIN - FUNCTIONAL ASSESSMENT
PAIN_FUNCTIONAL_ASSESSMENT: NONE - DENIES PAIN
PAIN_FUNCTIONAL_ASSESSMENT: 0-10

## 2023-01-29 ASSESSMENT — PAIN DESCRIPTION - LOCATION: LOCATION: SHOULDER

## 2023-01-29 ASSESSMENT — PAIN DESCRIPTION - FREQUENCY: FREQUENCY: CONTINUOUS

## 2023-01-29 ASSESSMENT — PAIN DESCRIPTION - ORIENTATION: ORIENTATION: LEFT

## 2023-01-29 ASSESSMENT — PAIN DESCRIPTION - ONSET: ONSET: SUDDEN

## 2023-01-29 ASSESSMENT — PAIN DESCRIPTION - PAIN TYPE: TYPE: ACUTE PAIN

## 2023-01-29 ASSESSMENT — PAIN SCALES - GENERAL: PAINLEVEL_OUTOF10: 4

## 2023-01-30 LAB
BACTERIA: NORMAL /HPF
BILIRUBIN URINE: NEGATIVE
BLOOD, URINE: NEGATIVE
CLARITY: CLEAR
COLOR: YELLOW
EKG ATRIAL RATE: 81 BPM
EKG P AXIS: 52 DEGREES
EKG P-R INTERVAL: 174 MS
EKG Q-T INTERVAL: 348 MS
EKG QRS DURATION: 82 MS
EKG QTC CALCULATION (BAZETT): 404 MS
EKG R AXIS: -24 DEGREES
EKG T AXIS: 26 DEGREES
EKG VENTRICULAR RATE: 81 BPM
GLUCOSE URINE: NEGATIVE MG/DL
KETONES, URINE: NEGATIVE MG/DL
LEUKOCYTE ESTERASE, URINE: NEGATIVE
NITRITE, URINE: NEGATIVE
PH UA: 7 (ref 5–9)
PROTEIN UA: NEGATIVE MG/DL
RBC UA: NORMAL /HPF (ref 0–2)
SPECIFIC GRAVITY UA: <=1.005 (ref 1–1.03)
UROBILINOGEN, URINE: 0.2 E.U./DL
WBC UA: NORMAL /HPF (ref 0–5)

## 2023-01-30 PROCEDURE — 6370000000 HC RX 637 (ALT 250 FOR IP): Performed by: INTERNAL MEDICINE

## 2023-01-30 PROCEDURE — 97530 THERAPEUTIC ACTIVITIES: CPT

## 2023-01-30 PROCEDURE — 97166 OT EVAL MOD COMPLEX 45 MIN: CPT

## 2023-01-30 PROCEDURE — 6370000000 HC RX 637 (ALT 250 FOR IP): Performed by: STUDENT IN AN ORGANIZED HEALTH CARE EDUCATION/TRAINING PROGRAM

## 2023-01-30 PROCEDURE — 94664 DEMO&/EVAL PT USE INHALER: CPT

## 2023-01-30 PROCEDURE — 2140000000 HC CCU INTERMEDIATE R&B

## 2023-01-30 PROCEDURE — 2580000003 HC RX 258: Performed by: INTERNAL MEDICINE

## 2023-01-30 PROCEDURE — 97161 PT EVAL LOW COMPLEX 20 MIN: CPT

## 2023-01-30 PROCEDURE — 94640 AIRWAY INHALATION TREATMENT: CPT

## 2023-01-30 PROCEDURE — 93010 ELECTROCARDIOGRAM REPORT: CPT | Performed by: INTERNAL MEDICINE

## 2023-01-30 PROCEDURE — 97535 SELF CARE MNGMENT TRAINING: CPT

## 2023-01-30 PROCEDURE — 6360000002 HC RX W HCPCS: Performed by: INTERNAL MEDICINE

## 2023-01-30 RX ORDER — CLONIDINE HYDROCHLORIDE 0.1 MG/1
0.1 TABLET ORAL ONCE
Status: COMPLETED | OUTPATIENT
Start: 2023-01-30 | End: 2023-01-30

## 2023-01-30 RX ORDER — ASPIRIN 81 MG/1
81 TABLET ORAL DAILY
Status: DISCONTINUED | OUTPATIENT
Start: 2023-01-30 | End: 2023-01-31 | Stop reason: HOSPADM

## 2023-01-30 RX ORDER — POLYETHYLENE GLYCOL 3350 17 G/17G
17 POWDER, FOR SOLUTION ORAL DAILY PRN
Status: DISCONTINUED | OUTPATIENT
Start: 2023-01-30 | End: 2023-01-31 | Stop reason: HOSPADM

## 2023-01-30 RX ORDER — ACETAMINOPHEN 650 MG/1
650 SUPPOSITORY RECTAL EVERY 6 HOURS PRN
Status: DISCONTINUED | OUTPATIENT
Start: 2023-01-30 | End: 2023-01-31 | Stop reason: HOSPADM

## 2023-01-30 RX ORDER — ENOXAPARIN SODIUM 100 MG/ML
30 INJECTION SUBCUTANEOUS DAILY
Status: DISCONTINUED | OUTPATIENT
Start: 2023-01-30 | End: 2023-01-31 | Stop reason: HOSPADM

## 2023-01-30 RX ORDER — SODIUM CHLORIDE 9 MG/ML
INJECTION, SOLUTION INTRAVENOUS PRN
Status: DISCONTINUED | OUTPATIENT
Start: 2023-01-30 | End: 2023-01-31 | Stop reason: HOSPADM

## 2023-01-30 RX ORDER — LEVOTHYROXINE SODIUM 0.05 MG/1
50 TABLET ORAL DAILY
Status: DISCONTINUED | OUTPATIENT
Start: 2023-01-30 | End: 2023-01-31 | Stop reason: HOSPADM

## 2023-01-30 RX ORDER — SODIUM CHLORIDE 0.9 % (FLUSH) 0.9 %
5-40 SYRINGE (ML) INJECTION PRN
Status: DISCONTINUED | OUTPATIENT
Start: 2023-01-30 | End: 2023-01-30 | Stop reason: SDUPTHER

## 2023-01-30 RX ORDER — DEXLANSOPRAZOLE 60 MG/1
60 CAPSULE, DELAYED RELEASE ORAL
Status: DISCONTINUED | OUTPATIENT
Start: 2023-01-30 | End: 2023-01-31 | Stop reason: HOSPADM

## 2023-01-30 RX ORDER — ACETAMINOPHEN 325 MG/1
650 TABLET ORAL EVERY 6 HOURS PRN
Status: DISCONTINUED | OUTPATIENT
Start: 2023-01-30 | End: 2023-01-31 | Stop reason: HOSPADM

## 2023-01-30 RX ORDER — SODIUM CHLORIDE 0.9 % (FLUSH) 0.9 %
5-40 SYRINGE (ML) INJECTION EVERY 12 HOURS SCHEDULED
Status: DISCONTINUED | OUTPATIENT
Start: 2023-01-30 | End: 2023-01-30 | Stop reason: SDUPTHER

## 2023-01-30 RX ORDER — METOPROLOL SUCCINATE 25 MG/1
25 TABLET, EXTENDED RELEASE ORAL DAILY
Status: DISCONTINUED | OUTPATIENT
Start: 2023-01-30 | End: 2023-01-31 | Stop reason: HOSPADM

## 2023-01-30 RX ORDER — BUDESONIDE 0.5 MG/2ML
500 INHALANT ORAL 2 TIMES DAILY
Status: DISCONTINUED | OUTPATIENT
Start: 2023-01-30 | End: 2023-01-31 | Stop reason: HOSPADM

## 2023-01-30 RX ORDER — ALBUTEROL SULFATE 2.5 MG/3ML
2.5 SOLUTION RESPIRATORY (INHALATION) EVERY 6 HOURS PRN
Status: DISCONTINUED | OUTPATIENT
Start: 2023-01-30 | End: 2023-01-31 | Stop reason: HOSPADM

## 2023-01-30 RX ORDER — DOCUSATE SODIUM 100 MG/1
100 CAPSULE, LIQUID FILLED ORAL 2 TIMES DAILY
Status: DISCONTINUED | OUTPATIENT
Start: 2023-01-30 | End: 2023-01-31 | Stop reason: HOSPADM

## 2023-01-30 RX ADMIN — SODIUM CHLORIDE, PRESERVATIVE FREE 10 ML: 5 INJECTION INTRAVENOUS at 02:02

## 2023-01-30 RX ADMIN — CLONIDINE HYDROCHLORIDE 0.1 MG: 0.1 TABLET ORAL at 02:02

## 2023-01-30 RX ADMIN — LEVOTHYROXINE SODIUM 50 MCG: 0.05 TABLET ORAL at 07:29

## 2023-01-30 RX ADMIN — METOPROLOL SUCCINATE 25 MG: 25 TABLET, EXTENDED RELEASE ORAL at 07:29

## 2023-01-30 RX ADMIN — BUDESONIDE 500 MCG: 0.5 SUSPENSION RESPIRATORY (INHALATION) at 09:16

## 2023-01-30 RX ADMIN — CLOPIDOGREL BISULFATE 300 MG: 300 TABLET, FILM COATED ORAL at 00:19

## 2023-01-30 RX ADMIN — SODIUM CHLORIDE, PRESERVATIVE FREE 10 ML: 5 INJECTION INTRAVENOUS at 09:29

## 2023-01-30 RX ADMIN — BUDESONIDE 500 MCG: 0.5 SUSPENSION RESPIRATORY (INHALATION) at 18:50

## 2023-01-30 RX ADMIN — ASPIRIN 81 MG: 81 TABLET, COATED ORAL at 09:29

## 2023-01-30 RX ADMIN — SODIUM CHLORIDE, PRESERVATIVE FREE 10 ML: 5 INJECTION INTRAVENOUS at 20:44

## 2023-01-30 RX ADMIN — DOCUSATE SODIUM 100 MG: 100 CAPSULE, LIQUID FILLED ORAL at 20:44

## 2023-01-30 RX ADMIN — DOCUSATE SODIUM 100 MG: 100 CAPSULE, LIQUID FILLED ORAL at 09:29

## 2023-01-30 ASSESSMENT — PAIN SCALES - GENERAL
PAINLEVEL_OUTOF10: 0

## 2023-01-30 NOTE — PLAN OF CARE
Problem: Chronic Conditions and Co-morbidities  Goal: Patient's chronic conditions and co-morbidity symptoms are monitored and maintained or improved  1/30/2023 0813 by Karla Pedersen RN  Outcome: Progressing  1/30/2023 0436 by Geoffrey Edwards RN  Outcome: Progressing     Problem: Skin/Tissue Integrity  Goal: Absence of new skin breakdown  Description: 1. Monitor for areas of redness and/or skin breakdown  2. Assess vascular access sites hourly  3. Every 4-6 hours minimum:  Change oxygen saturation probe site  4. Every 4-6 hours:  If on nasal continuous positive airway pressure, respiratory therapy assess nares and determine need for appliance change or resting period.   1/30/2023 0813 by Karla Pedersen RN  Outcome: Progressing  1/30/2023 0436 by Geoffrey Edwards RN  Outcome: Progressing     Problem: ABCDS Injury Assessment  Goal: Absence of physical injury  1/30/2023 0813 by Karla Pedersen RN  Outcome: Progressing  1/30/2023 0436 by Geoffrey Edwards RN  Outcome: Progressing

## 2023-01-30 NOTE — PROGRESS NOTES
Physical Therapy  Physical Therapy Initial Assessment     Name: Brynn Knutson  : 1927  MRN: [id number]      Date of Service: 2023    Evaluating PT:  Hernandez Parker PT, DPT [id number]    Room #:  6169/6042-L  Diagnosis:  Stroke-like symptoms [R29.90]  Stroke-like symptom [R29.90]  PMHx/PSHx:    Past Medical History:   Diagnosis Date    Acid reflux disease     Arthritis     Chronic back pain     COPD (chronic obstructive pulmonary disease) ([facility]     Hearing loss     Hyperlipidemia     Irritable bowel     Meningioma ([facility] 2018    Migraine     Neuropathy     Osteoarthritis     Osteoporosis     Thyroid disease     hypo    TIA (transient ischemic attack) 2018     Procedure/Surgery:  None  Precautions:  Falls, visual deficits  Equipment Needs:  TBD    SUBJECTIVE:    Pt lives alone in a 1 story home with 1 stairs to enter and no rail. Pt's daughter lives next door. Pt ambulated with cane or rollator PTA. Pt's daughter assists with cooking and laundry. OBJECTIVE:   Initial Evaluation  Date: 23 Treatment Short Term/ Long Term   Goals   AM-PAC 6 Clicks      Was pt agreeable to Eval/treatment? Yes     Does pt have pain? /10 abdominal discomfort     Bed Mobility  Rolling: NT  Supine to sit: SBA with HOB elevated  Sit to supine: NT  Scooting: SBA  Mod Independent   Transfers Sit to stand: SBA  Stand to sit: SBA  Stand pivot: SBA with [employer]  Mod Independent with [employer]   Ambulation   120 feet with SBA with [employer]  >400 feet with Mod Independent with [employer]   Stair negotiation: ascended and descended NT  >4 steps with 1 rail Mod Independent   ROM BUE:  WFL  BLE:  WFL     Strength BUE:  WFL  BLE:  4/5 except L hip flexion 3+ to 4-/5  Increase by 1/3 MMT grade   Balance Sitting EOB:  SBA  Dynamic Standing:  SBA with [employer]  Sitting EOB:  Independent  Dynamic Standing:   Mod Independent with [employer]     Pt is A & O x 4  Sensation:  no reported paresthesias   Edema:  none    Therapeutic Exercises:  NA    Patient education  Pt educated on safety    Patient response to education:   Pt verbalized understanding Pt demonstrated skill Pt requires further education in this area   x x x     ASSESSMENT:    Conditions Requiring Skilled Therapeutic Intervention:    []Decreased strength     []Decreased ROM  [x]Decreased functional mobility  [x]Decreased balance   [x]Decreased endurance   [x]Decreased posture  []Decreased sensation  []Decreased coordination   []Decreased vision  []Decreased safety awareness   []Increased pain       Comments:  Pt was in bed upon arrival, agreeable to initial evaluation. Increased thoracic kyphosis apparent with upright activity. Pt ambulated with decreased gait speed but no LOBs. Cues provided for [address] management when turning around. Fatigue limited further activity. Pt was left in chair with all needs met and call light in reach. RN notified. Treatment:  Patient practiced and was instructed in the following treatment:    Bed mobility training - pt given verbal cues to facilitate proper sequencing and safety during supine>sit. Sitting EOB for >5 minutes for upright tolerance, postural awareness and BLE ROM  Transfer training - pt was given verbal  cues to facilitate proper hand placement, technique and safety during sit to stand, stand to sit and stand pivot transfers. Gait training- pt was given verbal cues to facilitate safety during ambulation. Pt's/ family goals   1. Return home    Prognosis is good for reaching above PT goals.     Patient and or family understand(s) diagnosis, prognosis, and plan of care:   [x] Yes [] No     PHYSICAL THERAPY PLAN OF CARE:    PT POC is established based on physician order and patient diagnosis     Referring provider/PT Order:  Tasha Finley DO /01/30/23 0815 PT eval and treat  Diagnosis:  Stroke-like symptoms [R29.90]  Stroke-like symptom [R29.90]  Specific instructions for next treatment:  Progress activity    Current Treatment Recommendations:     [x] Strengthening to improve independence with functional mobility   [] ROM to improve independence with functional mobility   [x] Balance Training to improve static/dynamic balance and to reduce fall risk  [x] Endurance Training to improve activity tolerance during functional mobility   [x] Transfer Training to improve safety and independence with all functional transfers   [x] Gait Training to improve gait mechanics, endurance and asses need for appropriate assistive device  [x] Stair Training in preparation for safe discharge home and/or into the community   [] Positioning to prevent skin breakdown and contractures  [x] Safety and Education Training   [x] Patient/Caregiver Education   [] HEP  [] Other     PT long term treatment goals are located in above grid    Frequency of treatments: 2-5x/week x 1-2 weeks. Time in  1348  Time out  1411    Total Treatment Time  8 minutes     Evaluation Time includes thorough review of current medical information, gathering information on past medical history/social history and prior level of function, completion of standardized testing/informal observation of tasks, assessment of data and education on plan of care and goals.     CPT codes:  [x] Low Complexity PT evaluation 44716  [] Moderate Complexity PT evaluation 59614  [] High Complexity PT evaluation 54022  [] PT Re-evaluation 01618  [] Gait training 71229 - minutes  [] Manual therapy 34199 - minutes  [x] Therapeutic activities 65700 8 minutes  [] Therapeutic exercises 59460 - minutes  [] Neuromuscular reeducation 11360 - minutes     Jaelyn Ontiveros, PT, DPT  [id number]

## 2023-01-30 NOTE — CARE COORDINATION
Spoke with Pt about Transition Plan of Care. Pt lives in a single floor condo with no steps and Dtr lives across from her. P uses a ww. PCP: Dr. Gorge Das. Pharmacy: [facility] to provide transport at discharge. MAGDA Mcgregor. Discharge Plan is to return home with no further needs at this time. SW/CM to follow for discharge needs.    Daysi Contreras, L.S.W.  [phone]

## 2023-01-30 NOTE — PROGRESS NOTES
[address]       Date:2023                                                               Patient Name: Cris Fabian  MRN: [id number]  : 1927  Room: [address] [place] [address]    Evaluating OT: Debbi Reyes OTR/L [id number]    Referring Provider: Gilbert García DO   Specific Provider Orders/Date: OT eval and treat (23)       Diagnosis: Stroke-like symptoms [R29.90]  Stroke-like symptom [R29.90]    Surgery/Procedures: none     Pertinent Medical History:   Arthritis, Arthritis, chronic back pian, COPD, hearing loss, HLD, Meningioma, Neuropathy, OA, Osteoporosis, Thyroid disease, TIA, Kyphoplasty (), Stroke  with visual deficits     *Precautions:  Fall Risk, double vision L eye (since stroke 22), [place]    Assessment of current deficits   [x] Functional mobility  [x]ADLs  [x] Strength               []Cognition   [x] Functional transfers   [x] IADLs         [x] Safety Awareness   [x]Endurance   [] Fine Coordination        [x] ROM     [x] Vision/perception   []Sensation    []Gross Motor Coordination [x] Balance   [] Delirium                  []Motor Control     [] Communication    OT PLAN OF CARE   OT POC based on physician orders, patient diagnosis and results of clinical assessment.        Frequency/Duration: 1-3 days/wk for 1-2 weeks PRN    Specific OT Treatment to include:   ADL retraining/adapted techniques and AE recommendations to increase functional independence within precautions                    Energy conservation techniques to improve tolerance for selfcare routine   Functional transfer/mobility training/DME recommendations for increased independence, safety and fall prevention         Patient/family education to increase safety and functional independence within precautions              Environmental modifications for safe mobility and completion of ADLs Therapeutic activity to improve functional performance during ADLs/IADLs                                         Therapeutic exercise to improve tolerance and functional strength for ADLs   Balance retraining exercises/tasks for facilitation of postural control with dynamic challenges during ADLs . Positioning to improve functional independence  Neuromuscular re-education: facilitation of righting/equilibrium reactions, normalization muscle tone/facilitation active functional movement                      Delirium prevention/treatment    Modified Dumas Scale   Score     Description  0             No symptoms  1             No significant disability despite symptoms  2             Slight disability; able to look after own affairs  3             Moderate disability; able to ambulate without assist/ requires assist with ADLs  4             Moderate/Severe disability;requires assist to ambulate/assist with ADLs  5             Severe disability;bedridden/incontinent   6               Score:   2    Recommended Adaptive Equipment: pt owns all necessary AE     Home Living: Pt lives in a condo (daughter lives across the nash) with 1 step/no rail to enter. Bathroom setup: tub/shower with seat; rail  Equipment owned: shower seat, [employer], rollator, WC     Prior Level of Function: IND with ADLs; Assist with IADLs (daugher cooks most meals and assists with laundry.)   [employer] for ambulation. Driving: no    Pain Level: pt c/o 0/10  pain  this session    Cognition: A&O: 4/4    Follows 1-2 step commands appropriately.     Memory: good   Comprehension good   Problem solving: fair+/good   Judgement/safety: fair (walker mobility in tight spaces)               Communication skills: WFL           Vision: impaired vision (double) in L eye since stroke in                Glasses:yes                                                   Hearing: [place]    UE Assessment:  Hand Dominance: Right [x]  Left []     ROM Strength RUE  WFL 4/5   LUE WFL 4/5     Sensation: No c/o numbness/tingling in extremities. Finger ID WFL to light touch L hand. Pt reports numbness in face at onset since resolved. Tone: WNL   Edema: WFL     Functional Assessment:  AM-PAC Daily Activity Raw Score: 18/24   Initial Eval Status  Date: 1/30/23 Treatment Status  Date: STGs = LTGs  Time frame: 7-14 days   Feeding NT                        IND  while seated up in chair to increase activity tolerance        Grooming SBA  standing sink level using WW for support                        Rishabh   while standing sink level requiring AD as needed for balance and demonstrating G tolerance      UB dressing/bathing Min A                        Rishabh       LB dressing/bathing Min A  Bed level                        Rishabh   using AE as needed for safe reach/ energy conservation       Toileting SBA                        Rishabh     Bed Mobility  Supine to sit:   SBA    Sit to supine:   NT                        Rishabh  in prep of ADL tasks & transfers   Functional Transfers Sit to stand:   SBA    Stand to sit:   SBA    Commode: SBA using rail                        Rishabh  sit<>stand/functional bathroom transfers using AD/DME as needed for balance and safety   Functional Mobility SBA [employer]                       Rishabh   functional/bathroom mobility using AD as needed & demonstrating G safety     Balance Sitting:     Static:  S    Dynamic:SBA  Standing: SBA [employer]  Rishabh dynamic sitting balance; Rishabh dynamic standing balance  during ADL tasks & transfers   Endurance/  Activity Tolerance   F tolerance with light to moderate activity.    G   tolerance with moderate activity/self care routine   Visual/  Perceptual Impaired:   L visual field  (double vision corrects with L eye closed)                       Treatment: OT treatment provided this date includes:  Energy conservation: Education on breathing techniques, pacing, work simplification strategies & recommended bathroom DME for safety and energy conservation during self care tasks and activities of daily living. Line management and environmental modifications made prior to and end of session to ensure patient safety and to increase efficiency of session. Skilled monitoring of HR, O2 saturation, blood pressure and patient's response to activity performed throughout session. Comments: OK from RN to see patient. Upon arrival, patient supine in bed, agreeable to session. Pt demo fair tolerance with good understanding of education/techniques. At end of session, patient left seated up in chair to increase activity tolerance. Call light within reach, all lines and tubes intact. Pt instructed on use of call light for assistance and fall prevention. .      Pt can benefit from continued skilled OT during hospital stay to increase safety, functional independence and quality of life. Rehab Potential: good for established goals    Patient / Family Goal: to return to [facility]    Patient and/or family were instructed/educated on diagnosis, prognosis/goals and plan of care. Pt demonstrated G understanding. Evaluation Complexity: Moderate     History: Expanded chart review of consults, imaging, and psychosocial history related to current functional performance. Exam: 5+ performance deficits identified limiting functional independence and safe return home   Assistance/Modification: Min/mod assistance or modifications required to perform tasks. May have comorbidities that affect occupational performance. [] Malnutrition indicators have been identified and nursing has been notified to ensure a dietitian consult is ordered.       Time In:1400             Time Out: 1423         Total Treatment time: 8   Min Units   OT Eval Low 31072     OT Eval Medium 23657 X    OT Eval High [id number]     OT Re-Eval [id number]     Therapeutic Ex [id number]     Therapeutic Activities 99732     ADL/Self Care 11452 8 1   Orthotic Management 09191     Neuro Re-Ed 57375     Non-Billable Time        Evaluation time includes thorough review of current medical information, gathering information on past medical history/social history and prior level of function, completion of standardized testing/informal observation of tasks, assessment of data and development of POC/Goals.      Thkilol Estimable, OTR/L 1322

## 2023-01-30 NOTE — H&P
[facility]                  [address] [place] [address]                              HISTORY AND PHYSICAL    PATIENT NAME: MIRYAM SMITH                          :        1927  MED REC NO:   [id number]                            ROOM:       6501  ACCOUNT NO:   [id number]                           ADMIT DATE: 2023  PROVIDER:     Milind Hernandez DO    CHIEF COMPLAINT:  Headache, left shoulder pain.    HISTORY OF PRESENT ILLNESS:  The patient is a 89-year-old   female who presented to the emergency room complaining of acute onset of  headache at the back of her head about 08:30 p.m. and left shoulder felt  strange.  She presented to the emergency room, there were concerns for  stroke.  Blood pressure was markedly elevated.  She was admitted for  further evaluation and treatment.    PAST MEDICAL HISTORY:  Hypothyroid, osteoarthritis, COPD, irritable  bowel syndrome, hypertension, on no medication, meningioma, patent  foramen ovale.    PAST SURGICAL HISTORY:  Tonsillectomy, appendectomy, cholecystectomy,  thyroid surgery, wisdom teeth extraction, bilateral eye cataract  surgery, bilateral upper eyelid surgery, hysterectomy, hemorrhoidectomy,  bilateral feet bunion surgery, left ankle surgery.    REVIEW OF SYSTEMS:  Remarkable for above-stated chief complaint.    ALLERGIES:  TRAMADOL, AMLODIPINE, BENTYL, CODEINE, LACTOSE INTOLERANCE,  MOBIC, MSG, ARTIFICIAL SWEETENERS, PANTOPRAZOLE, PREDNISONE, TOLUENE.    MEDICATIONS PRIOR TO ADMISSION:  Aspirin, Colace, albuterol inhaler,  vitamin D, Tylenol, Dexilant, Pulmicort, Synthroid.    SOCIAL HISTORY:  No tobacco, no alcohol.    PRIMARY CARE PHYSICIAN:  Milind Cobb MD.    PHYSICAL EXAMINATION:  GENERAL APPEARANCE:  Reveals a 89-year-old  female who is alert  and oriented x3, cooperative, and a good historian.  VITAL SIGNS:  On admission, temperature 97.4, pulse 91, respirations  16,  blood pressure 207/100. HEENT:  Head:  Normocephalic, atraumatic. Eyes:  Pupils equal and  reactive to light. Extraocular muscles intact. Fundi not well  visualized. Nose:  No obstruction, polyp or discharge noted. Mouth:   Mucosa without lesion. Teeth edentulous. Pharynx noninjected without  exudate. NECK:  Supple. No JVD, thyromegaly. No carotid bruits. HEART:  Regular rate and rhythm with grade 1/6 systolic murmur. LUNGS:  Clear to auscultation bilaterally. ABDOMEN:  Positive bowel sounds. Soft, nontender. No rebound or  guarding. No hepatosplenomegaly. No masses. BACK:  With increased thoracic kyphosis. EXTREMITIES:  Without edema. There are bilateral lower extremity  varicose veins. There is good strength in the upper and lower  extremities. LYMPH NODES:  No adenopathy noted. SKIN:  Without rash or lesion. IMPRESSION:  Hypertensive crisis, hypothyroidism, osteoarthritis, COPD,  irritable bowel syndrome, meningioma, PFO. PLAN:  Admit. Blood pressure control. Discharge plan home when stable.         Ashley Leach DO    D: 01/30/2023 6:28:18       T: 01/30/2023 6:30:53     [id number]  Job#: [id number]     Doc#: [id number]    CC:

## 2023-01-30 NOTE — ED NOTES
Nurse to nurse called to floor.  Patient ready to go to floor     Ramana Almendarez RN  01/30/23 0321

## 2023-01-30 NOTE — PATIENT CARE CONFERENCE
P Quality Flow/Interdisciplinary Rounds Progress Note        Quality Flow Rounds held on January 30, 2023    Disciplines Attending:  Bedside Nurse, , , and Nursing Unit Leadership    Chayo Knutson was admitted on 1/29/2023 10:06 PM    Anticipated Discharge Date:       Disposition:    Freeman Score:  Freeman Scale Score: 18    Readmission Risk              Risk of Unplanned Readmission:  11           Discussed patient goal for the day, patient clinical progression, and barriers to discharge.   The following Goal(s) of the Day/Commitment(s) have been identified:  report labs/diagnostics       Lyle Galdamez RN  January 30, 2023

## 2023-01-30 NOTE — ED PROVIDER NOTES
[facility] ENCOUNTER        Pt Name: Albania Barraza  MRN: [id number]  [place] 12/22/1927  Date of evaluation: 1/29/2023  Provider: Lola Palacio DO  PCP: Duyen Herrera MD  Note Started: 10:14 PM EST 1/29/23    CHIEF COMPLAINT       Chief Complaint   Patient presents with    Cerebrovascular Accident     LKW 2030. PT states back of head and left side of face feels funny. Pt also having pain in left shoulder        HISTORY OF PRESENT ILLNESS: 1 or more Elements   History From: Patient and EMS    Limitations to history : None     Albania Barraza is a 80 y.o. female with past medical history of hypothyroidism, OA, COPD, ocular myasthenia gravis, meningioma, hypertension and CVA/TIA who presents to the emergency department via EMS from home due to concern acute stroke. Last known well 8:30 PM.  Patient was complaining of left-sided weakness as well as a headache. Patient was also noting a weird sensation in the back of her head on the left as well. She states that she was watching TV before bed when all of her symptoms started. Patient does admit to history of stroke about a year ago. She states that her symptoms are somewhat similar to this. She was also complaining of a vague abnormal sensation of her the left side of her face. No clear facial droop noted however. Patient is not on any blood thinners currently. She takes a baby aspirin daily. Patient otherwise denies any other symptoms including fever, chills, nausea, vomiting, cough, congestion, sore throat, urinary symptoms, abdominal pain, constipation or diarrhea. Her presenting symptoms are moderate with no remitting or exacerbating factors. Patient is well-appearing in no acute distress on initial assessment. No recent sick contacts or illnesses noted.     Nursing Notes were all reviewed and agreed with or any disagreements were addressed in the HPI.    ROS: Pertinent positives and negatives are stated within HPI, all other systems reviewed and are negative.    --------------------------------------------- PAST HISTORY ---------------------------------------------  Past Medical History:  has a past medical history of Acid reflux disease, Arthritis, Chronic back pain, COPD (chronic obstructive pulmonary disease) ([facility] [address] Hearing loss, Hyperlipidemia, Irritable bowel, [facility] Migraine, Neuropathy, Osteoarthritis, Osteoporosis, Thyroid disease, and TIA (transient ischemic attack). Past Surgical History:  has a past surgical history that includes Hysterectomy; Tonsillectomy; Appendectomy; Thyroid surgery; Endoscopy, colon, diagnostic (8/2012); ERCP (10/31/2012); ERCP (12/05/2012); fracture surgery; eye surgery; Cholecystectomy; Fixation Kyphoplasty (08/03/2016); Breast surgery; Colonoscopy; Ankle surgery (1980); Breast biopsy (1978); pr office/outpt visit,procedure only (N/A, 8/1/2018); and blepharoplasty. Social History:  reports that she quit smoking about 27 years ago. Her smoking use included cigarettes. She started smoking about 63 years ago. She smoked an average of 1 pack per day. She has never used smokeless tobacco. She reports that she does not drink alcohol and does not use drugs. Family History: family history includes Cancer in her brother, brother, and mother; Emphysema in her father; Heart Disease in her brother; Rheum Arthritis in her sister. The patients home medications have been reviewed.     Allergies: Tramadol, Amlodipine besylate, Bentyl [dicyclomine hcl], Codeine, Lactose intolerance (gi), Mobic [meloxicam], Monosodium glutamate, Other, Pantoprazole, Prednisone, and Talwin [pentazocine]    ---------------------------------------------------PHYSICAL EXAM--------------------------------------    Constitutional/General: Alert and oriented x3, well appearing, non toxic in NAD, elderly but appears stated age  Head: Normocephalic and atraumatic  Mouth: Oropharynx clear, handling secretions, no trismus  Neck: Supple, full ROM,  Pulmonary: Lungs clear to auscultation bilaterally, no wheezes, rales, or rhonchi. Not in respiratory distress  Cardiovascular:  Regular rate. Regular rhythm. No murmurs  Chest: no chest wall tenderness  Abdomen: Soft. Non tender. Non distended. No rebound, guarding, or rigidity. No pulsatile masses appreciated. Musculoskeletal: Moves all extremities x 4. Warm and well perfused, no clubbing, cyanosis, or edema. Capillary refill <3 seconds  Skin: warm and dry. No rashes. Neurologic: GCS 15  Psych: Normal Affect    NIH Stroke Scale/Score at time of initial evaluation:  1A: Level of Consciousness 0 - alert; keenly responsive   1B: Ask Month and Age 0 - answers both questions correctly   1C:  Tell Patient To Open and Close Eyes, then Hand  Squeeze 0 - performs both tasks correctly   2: Test Horizontal Extraocular Movements 0 - normal   3: Test Visual Fields 0 - no visual loss   4: Test Facial Palsy 0 - normal symmetric movement   5A: Test Left Arm Motor Drift 0 - no drift, limb holds 90 (or 45) degrees for full 10 seconds   5B: Test Right Arm Motor Drift 0 - no drift, limb holds 90 (or 45) degrees for full 10 seconds   6A: Test Left Leg Motor Drift 1 - drift; leg falls by the end of the 5 second period but does not hit bed   6B: Test Right Leg Motor Drift 0 - no drift; leg holds 30 degree position for full 5 seconds   7: Test Limb Ataxia   (FNF/Heel-Shin) 0 - absent   8: Test Sensation 1 - mild to moderate sensory loss; patient feels pinprick is less sharp or is dull on the affected side; there is a loss of superficial pain with pinprick but patient is aware of being touched    9: Test Language/Aphasia 0 - no aphasia, normal   10: Test Dysarthria 0 - normal   11: Test Extinction/Inattention 0 - no abnormality   Total Score: 2   1/29/23     -------------------------------------------------- RESULTS -------------------------------------------------  I have personally reviewed all laboratory and imaging results for this patient. Results are listed below.      LABS:  Results for orders placed or performed during the hospital encounter of 01/29/23   CBC with Auto Differential   Result Value Ref Range    WBC 6.2 4.5 - 11.5 E9/L    RBC 4.12 3.50 - 5.50 E12/L    Hemoglobin 13.0 11.5 - 15.5 g/dL    Hematocrit 39.1 34.0 - 48.0 %    MCV 94.9 80.0 - 99.9 fL    MCH 31.6 26.0 - 35.0 pg    MCHC 33.2 32.0 - 34.5 %    RDW 12.8 11.5 - 15.0 fL    Platelets 449 090 - 735 E9/L    MPV 9.1 7.0 - 12.0 fL    Neutrophils % 49.8 43.0 - 80.0 %    Immature Granulocytes % 0.2 0.0 - 5.0 %    Lymphocytes % 40.5 20.0 - 42.0 %    Monocytes % 6.3 2.0 - 12.0 %    Eosinophils % 2.4 0.0 - 6.0 %    Basophils % 0.8 0.0 - 2.0 %    Neutrophils Absolute 3.07 1.80 - 7.30 E9/L    Immature Granulocytes # 0.01 E9/L    Lymphocytes Absolute 2.50 1.50 - 4.00 E9/L    Monocytes Absolute 0.39 0.10 - 0.95 E9/L    Eosinophils Absolute 0.15 0.05 - 0.50 E9/L    Basophils Absolute 0.05 0.00 - 0.20 E9/L   CMP   Result Value Ref Range    Sodium 133 132 - 146 mmol/L    Potassium 4.3 3.5 - 5.0 mmol/L    Chloride 97 (L) 98 - 107 mmol/L    CO2 22 22 - 29 mmol/L    Anion Gap 14 7 - 16 mmol/L    Glucose 114 (H) 74 - 99 mg/dL    BUN 14 6 - 23 mg/dL    Creatinine 0.8 0.5 - 1.0 mg/dL    Est, Glom Filt Rate >60 >=60 mL/min/1.73    Calcium 9.7 8.6 - 10.2 mg/dL    Total Protein 7.3 6.4 - 8.3 g/dL    Albumin 4.6 3.5 - 5.2 g/dL    Total Bilirubin 0.3 0.0 - 1.2 mg/dL    Alkaline Phosphatase 96 35 - 104 U/L    ALT 10 0 - 32 U/L    AST 18 0 - 31 U/L   Lactic Acid   Result Value Ref Range    Lactic Acid 1.0 0.5 - 2.2 mmol/L   Troponin   Result Value Ref Range    Troponin, High Sensitivity 15 (H) 0 - 9 ng/L   Protime-INR   Result Value Ref Range    Protime 12.4 9.3 - 12.4 sec    INR 1.1    APTT   Result Value Ref Range    aPTT 35.4 (H) 24.5 - 35.1 sec   POCT glucose   Result Value Ref Range    Glucose 103 mg/dL    QC OK? yes    POCT Glucose   Result Value Ref Range    Meter Glucose 103 (H) 74 - 99 mg/dL   EKG 12 Lead   Result Value Ref Range    Ventricular Rate 81 BPM    Atrial Rate 81 BPM    P-R Interval 174 ms    QRS Duration 82 ms    Q-T Interval 348 ms    QTc Calculation (Bazett) 404 ms    P Axis 52 degrees    R Axis -24 degrees    T Axis 26 degrees       RADIOLOGY:   Interpretation per the Radiologist below, if available at the time of this note:    CTA HEAD W CONTRAST   Final Result   1. Estimated stenosis of the proximal right and left internal carotid   artery by NASCET criteria is not hemodynamically significant   2. Moderate atherosclerotic disease . 3. No large vessel occlusion identified            This study was analyzed by the Xero. ai algorithm. CTA NECK W CONTRAST   Final Result   1. Estimated stenosis of the proximal right and left internal carotid   artery by NASCET criteria is not hemodynamically significant   2. Moderate atherosclerotic disease . 3. No large vessel occlusion identified            This study was analyzed by the Xero. ai algorithm. CT BRAIN PERFUSION   Final Result      No significant ischemic penumbra identified      This study was analyzed by the Xero. ai algorithm. CT HEAD WO CONTRAST   Final Result   Diffuse atrophy likely age related   Findings compatible with small vessel ischemic changes. The findings were called to Dr. Neal Howard   Final Result   No acute process. No results found. No results found. See preliminary interpretation by me below. EKG: This EKG is signed and interpreted by me. Normal sinus rhythm with ventricular rate of 81 bpm.  NH interval normal.  QTc not prolonged. No evidence of acute ST elevation MI.   No significant changes compared to previous EKG.      ------------------------- NURSING NOTES AND VITALS REVIEWED ---------------------------   The nursing notes within the ED encounter and vital signs as below have been reviewed by myself. BP (!) 166/83   Pulse 91   Temp 97.4 °F (36.3 °C) (Oral)   Resp 16   Ht 5' 1\" (1.549 m)   Wt 105 lb (47.6 kg)   SpO2 95%   BMI 19.84 kg/m²   Oxygen Saturation Interpretation: Normal    The patients available past medical records and past encounters were reviewed. ------------------------------ ED COURSE/MEDICAL DECISION MAKING----------------------  Medications   clopidogrel (PLAVIX) tablet 300 mg (has no administration in time range)   sodium chloride flush 0.9 % injection 5-40 mL (has no administration in time range)   sodium chloride flush 0.9 % injection 5-40 mL (has no administration in time range)   0.9 % sodium chloride infusion (has no administration in time range)   acetaminophen (TYLENOL) tablet 650 mg (has no administration in time range)   ondansetron (ZOFRAN-ODT) disintegrating tablet 4 mg (has no administration in time range)     Or   ondansetron (ZOFRAN) injection 4 mg (has no administration in time range)   iopamidol (ISOVUE-370) 76 % injection 100 mL (100 mLs IntraVENous Given 1/29/23 2223)       Medical Decision Making/Differential Diagnosis:    CC/HPI Summary, Pertinent Physical Exam Findings, Social Determinants of health, Records Reviewed, DDx, testing done/not done, ED Course, Reassessment, disposition considerations/shared decision making with patient, consults, disposition:        Medical Decision Making:   I, Dr. Yuliet Christensen am the resident physician of record. History From: Patient and EMS    Limitations to history : None     Bhumi Delong is a 80 y.o. female who presents to the ED for stroke like symptoms including left sided weakness and headache.    Vital signs upon arrival BP (!) 166/83   Pulse 91   Temp 97.4 °F (36.3 °C) (Oral)   Resp 16   Ht 5' 1\" (1.549 m)   Wt 105 lb (47.6 kg)   SpO2 95%   BMI 19.84 kg/m²     On initial evaluation, patient is nontoxic-appearing, afebrile, hemodynamically stable and in no acute distress. Initial vitals significant for hypertension at 207/100. Remaining vitals within normal limits. Working diagnoses include but are not limited to vertigo, orthostatic hypotension, acute vestibular syndrome, hypertensive urgency/emergency, cardiac arrhythmia, dehydration, seizures, complex migraine, CVA, TIA and ACS. Patient was awake, alert and oriented x3 on arrival.  Moving all 4 extremities and following commands appropriately. POCT glucose 103. Physical exam remarkable for slight weakness and the left lower extremity as well as mild sensory deficit. No other concerning physical exam findings. Lungs were clear to auscultation with no wheezes, rales or rhonchi. Abdomen was soft, nontender nondistended without rebound, guarding or rigidity. No nuchal rigidity or meningeal signs noted. No resting nystagmus or truncal ataxia. Last known well 8:30 PM.  Stroke alert called. NIH 2 on arrival.  Work-up in the emergency department was generally unremarkable. Imaging studies did not suggest acute stroke or significant stenosis. CTA head/neck with no large vessel occlusion or significant stenosis. CT perfusion with no notable penumbra. CT head unremarkable with no acute intracranial process. Imaging studies as interpreted by me detail below with official radiology read above. Lab work-up was also unremarkable. Work-up was interpreted by me include CBC with no leukocytosis, left shift or significant anemia. WBC 6.2 and hemoglobin stable at 13. CMP unremarkable with stable renal function, creatinine 0.8/BUN 14. No major electrolyte abnormalities including normal potassium of 4.3. LFTs within normal limits. Lactic acid normal at 1.0. PT/INR 12.4/1. 1.  aPTT 35.4. Cardiac evaluation unremarkable. Initial troponin 15 and appears to be chronically elevated on review of previous labs. EKG was sinus and nonspecific with no acute ischemic changes. Chest x-ray was clear. On reevaluation, patient was noting significant improvement in her presenting symptoms. Discussed case with neurology who suggested admission and loading dose of oral Plavix 300 mg which was given in the ED. Plan is to admit the patient for further work-up and evaluation of strokelike symptoms. Patient accepted for admission by Dr. Steve Lugo. She was agreeable with plan after shared decision making. Patient remained hemodynamically stable in the ED. Blood pressure did improve to 166/83 on recheck. Non-plain film images such as CT, Ultrasound and MRI are read by the radiologist. Belem Samuel radiographic images are visualized and preliminarily interpreted by the ED Provider with the below findings:    Chest x-ray with no obvious focal consolidation suggestive of pneumonia, large pleural effusions or pneumothorax. Normal cardiac silhouette. CT head with no obvious intracranial hemorrhage, large masses, midline shift or herniation. Discussion with Other Profesionals : Admitting Team accepted the patient for admission and Consultant neurology who recommended admission and loading dose of Plavix. Social Determinants : None    Records Reviewed : Other echocardiogram from 2/6/2022 was reviewed. Ejection fraction 70%. Stage I diastolic dysfunction. Chronic conditions: hypothyroidism, OA, COPD, ocular myasthenia gravis, meningioma, hypertension and CVA/TIA     CONSULTS: Internal Medicine and Neurology      Disposition:   Admission    Consultation with Dr. Steve Lugo . The patient will be admitted for further treatment and evaluation for   1. Stroke-like symptoms          Re-Evaluations/Consultations:             ED Course as of 01/29/23 2349   Terry Vazquez Jan 29, 2023   2310 Patient reevaluated. She states that her symptoms have improved significantly since arrival to the ED. Repeat NIH 0  [PP]   3205 Spoke with neurology team concerning the patient.   He recommends admission and loading dose of plavix 300 mg.  Neurology to see patient tomorrow. [PP]   3344 EKG:  This EKG is signed and interpreted by me.    Normal sinus rhythm with ventricular rate of 81 bpm.  PA interval normal.  QTc not prolonged.  No evidence of acute ST elevation MI.  No significant changes compared to previous EKG. [PP]      ED Course User Index  [PP] Ciera Loaiza DO         This patient's ED course included: History, physical examination, reevaluation prior to disposition    This patient has remained hemodynamically stable during their ED course.    Counseling:   The emergency provider has spoken with the patient and discussed today’s results, in addition to providing specific details for the plan of care and counseling regarding the diagnosis and prognosis.  Questions are answered at this time and they are agreeable with the plan.       --------------------------------- IMPRESSION AND DISPOSITION ---------------------------------    IMPRESSION  1. Stroke-like symptoms        DISPOSITION  Disposition: Admit to telemetry  Patient condition is stable        NOTE: This report was transcribed using voice recognition software. Every effort was made to ensure accuracy; however, inadvertent computerized transcription errors may be present         Ciera Loaiza DO  Resident  01/29/23 5472  ATTENDING PROVIDER ATTESTATION:     I have personally performed and/or participated in the history, exam, medical decision making, and procedures and agree with all pertinent clinical information.      I have also reviewed and agree with the past medical, family and social history unless otherwise noted.    I have discussed this patient in detail with the resident, and provided the instruction and education regarding stroke symptoms.    My findings/Plan: I was the primary provider for patient.  Patient with history of COPD history of myasthenia gravis history hypertension and meningioma as well as had reported stroke 1 year ago in February.  Patient  presenting here because of left-sided weakness feeling numb in the left side of her face as well as having headache. Patient reporting feeling weak in her lower extremity. Patient reporting no difficulty with her speech. Patient reporting no chest pain no abdominal pain no vomiting. She reports no fall or injury or syncopal event. Patient on arrival here is awake alert oriented x3 she has no facial droop. Patient has diminished sensation in her left side of her face as well as arm and leg. Patient does have drift noted in her left lower extremity. Stroke scale is about a 2. Patient differential includes TIA as well as CVA as well as hemorrhagic stroke as well as electrolyte normality that can cause her numbness. Patient was made a stroke alert here in the emergency department. Patient EKG showed sinus rhythm with no acute ST elevation. EKG his rate was 81. There is nonspecific ST changes unchanged from her prior EKG. I do agree with the interpretation by the resident. It was compared to the EKG from February 17, 2022. Patient had IV established placed on monitor here in the emergency department. EKG is ordered to have documentation of patient's current rhythm, and to rule out any obvious acute cardiac illnesses such as ACS. Additionally, QT interval may be of use in decision making regarding any medications administered here in the ED. Patient is placed on cardiac monitor and continuous pulse ox for monitoring. CBC is ordered to evaluate for any signs of infection or inflammation by obtaining a WBC count, or any signs of acute anemia by interpreting hemoglobin. CMP was ordered to evaluate for any electrolyte imbalances, kidney function, or any elevations in anion gap. Troponin ordered to evaluate for possible cardiac etiology of symptoms including but not limited to STEMI or NSTEMI. CT head without contrast was ordered to evaluate for acute or chronic intracranial hemorrhage or masses.  Chest x-ray is ordered to evaluate for any possible signs of pneumonia, pleural effusions, cardiomegaly, pneumothorax, atelectasis, rib or sternal abnormalities including fractures. Patient CBC and electrolytes are within normal limits troponin noted. Level was 15. CT shows moderate atherosclerotic disease but no large vessel acute occlusion CT brain perfusion is also negative. Chest x-ray showed no acute findings. Patient was reassessed and improving. Patient reports less facial numbness as well as able to raise her left leg without any difficulty there is no drift. Patient while here in the emergency department was complaining some double vision but reportedly has had intermittently. Patient gross visual acuity is within normal limits. Patient and family made aware of findings. Her blood pressure has come down in the 592 systolic range it was initially elevated over 200. Patient made aware of plan we did speak to on-call neurology in regards to patient's symptoms and recommended aspirin and Plavix patient will be given 300 mg. Patient is not a candidate for IV anticoagulation due to her improvement of her symptoms. And stroke scale is below 4. Patient will be admitted to monitored bed I did speak to Dr. Edmar Trinh and he agreed with admission. Please note that the withdrawal or failure to initiate urgent interventions for this patient would likely result in a life threatening deterioration or permanent disability. Accordingly this patient received 30 minutes of critical care time, excluding separately billable procedures.        Sony Barnett MD  01/30/23 0174

## 2023-01-30 NOTE — PROGRESS NOTES
Comprehensive Nutrition Assessment    Type and Reason for Visit:  Initial, Positive Nutrition Screen    Nutrition Recommendations/Plan:   Continue current diet order   Continue to monitor while inpatient        Malnutrition Assessment:  Malnutrition Status: At risk for malnutrition (Comment) (01/30/23 1208)    Context:  Chronic Illness     Findings of the 6 clinical characteristics of malnutrition:  Energy Intake:  No significant decrease in energy intake  Weight Loss:  No significant weight loss     Body Fat Loss:  No significant body fat loss     Muscle Mass Loss:  Mild muscle mass loss    Fluid Accumulation:  No significant fluid accumulation     Strength:  Not Performed    Nutrition Assessment:    Pt admit w/ stroke like symptoms. PMHx of hypothyroidism, osteoarthritis, COPD, IBS, HTN, meningioma, TIA. Pt has good appetite, did not like the food served this morning, requested ONs be discontinued, does not wish for alternative. Will continue to monitor. Nutrition Related Findings:    abd soft, non-tender, non-distended, active BS x4, A&Ox4, I/O's WDL, no edema noted. pt states breakfast was cold and gross so she did not eat it, good appetite and intake PTA, does not like ONS, pt reports intermittent N/V over last 4 months Wound Type: None       Current Nutrition Intake & Therapies:    Average Meal Intake: 1-25% (Did not like breakfast)  Average Supplements Intake: Refusing to take  ADULT DIET; Regular    Anthropometric Measures:  Height: 5' 1\" (154.9 cm)  Ideal Body Weight (IBW): 105 lbs (48 kg)    Admission Body Weight: 106 lb 1 oz (48.1 kg) (1/30 bed scale)  Current Body Weight: 106 lb 1 oz (48.1 kg) (1/30 bed scale), 101 % IBW.     Current BMI (kg/m2): 20.1  Usual Body Weight: 111 lb 11 oz (50.7 kg) (2/8/22 , UBW 112lb  verified per pt)  % Weight Change (Calculated): -5  Weight Adjustment For: No Adjustment  BMI Categories: Underweight (BMI less than 22) age over 72    Estimated Daily Nutrient Needs:  Energy Requirements Based On: Formula  Weight Used for Energy Requirements: Current  Energy (kcal/day): 1300-1500kcal/day  Weight Used for Protein Requirements: Current  Protein (g/day): 70-75g/day (1.4-1.6g/kg CBW)  Method Used for Fluid Requirements: 1 ml/kcal  Fluid (ml/day): 1ml/kcal or 1300-1500ml/day    Nutrition Diagnosis:   No nutrition diagnosis at this time related to   as evidenced by      Nutrition Interventions:   Food and/or Nutrient Delivery: Continue Current Diet  Nutrition Education/Counseling: No recommendation at this time  Coordination of Nutrition Care: Continue to monitor while inpatient    Goals:  Goals: PO intake 75% or greater, by next RD assessment    Nutrition Monitoring and Evaluation:   Behavioral-Environmental Outcomes: None Identified  Food/Nutrient Intake Outcomes: Food and Nutrient Intake  Physical Signs/Symptoms Outcomes: Biochemical Data, Chewing or Swallowing, GI Status, Fluid Status or Edema, Nutrition Focused Physical Findings, Skin, Weight    Discharge Planning:     Too soon to determine     Amari Ocasio RD  Contact:

## 2023-01-31 VITALS
WEIGHT: 106.04 LBS | HEART RATE: 60 BPM | BODY MASS INDEX: 20.02 KG/M2 | OXYGEN SATURATION: 96 % | RESPIRATION RATE: 16 BRPM | SYSTOLIC BLOOD PRESSURE: 123 MMHG | TEMPERATURE: 96.6 F | HEIGHT: 61 IN | DIASTOLIC BLOOD PRESSURE: 64 MMHG

## 2023-01-31 PROCEDURE — 2580000003 HC RX 258: Performed by: INTERNAL MEDICINE

## 2023-01-31 PROCEDURE — 6370000000 HC RX 637 (ALT 250 FOR IP): Performed by: INTERNAL MEDICINE

## 2023-01-31 PROCEDURE — 94640 AIRWAY INHALATION TREATMENT: CPT

## 2023-01-31 RX ORDER — METOPROLOL SUCCINATE 25 MG/1
25 TABLET, EXTENDED RELEASE ORAL DAILY
Qty: 30 TABLET | Refills: 0 | Status: SHIPPED | OUTPATIENT
Start: 2023-01-31

## 2023-01-31 RX ORDER — ACETAMINOPHEN 325 MG/1
650 TABLET ORAL EVERY 6 HOURS PRN
Qty: 120 TABLET | Refills: 3 | COMMUNITY
Start: 2023-01-31

## 2023-01-31 RX ADMIN — BUDESONIDE 500 MCG: 0.5 SUSPENSION RESPIRATORY (INHALATION) at 07:21

## 2023-01-31 RX ADMIN — ASPIRIN 81 MG: 81 TABLET, COATED ORAL at 07:45

## 2023-01-31 RX ADMIN — SODIUM CHLORIDE, PRESERVATIVE FREE 10 ML: 5 INJECTION INTRAVENOUS at 07:45

## 2023-01-31 RX ADMIN — METOPROLOL SUCCINATE 25 MG: 25 TABLET, EXTENDED RELEASE ORAL at 07:45

## 2023-01-31 RX ADMIN — LEVOTHYROXINE SODIUM 50 MCG: 0.05 TABLET ORAL at 06:45

## 2023-01-31 RX ADMIN — DOCUSATE SODIUM 100 MG: 100 CAPSULE, LIQUID FILLED ORAL at 07:45

## 2023-01-31 NOTE — PROGRESS NOTES
[facility] Medicine    Subjective:  pt alert conversive stephanie diet did well with therapy      Current Facility-Administered Medications:     albuterol (PROVENTIL) nebulizer solution 2.5 mg, 2.5 mg, Nebulization, Q6H PRN, Rebeca Hernandez DO    aspirin EC tablet 81 mg, 81 mg, Oral, Daily, Rebeca Hernandez DO, 81 mg at 01/30/23 0929    budesonide (PULMICORT) nebulizer suspension 500 mcg, 500 mcg, Nebulization, BID, Adelaida Ba DO, 500 mcg at 01/30/23 1850    dexlansoprazole (DEXILANT) delayed release capsule 60 mg (Patient Supplied), 60 mg, Oral, QAM AC, Rebeca Hernandez DO    docusate sodium (COLACE) capsule 100 mg, 100 mg, Oral, BID, Rebeca Hernandez DO, 100 mg at 01/30/23 2044    levothyroxine (SYNTHROID) tablet 50 mcg, 50 mcg, Oral, Daily, Rebeca Hernandez DO, 50 mcg at 01/30/23 0729    0.9 % sodium chloride infusion, , IntraVENous, PRN, Rebeca Hernandez DO    enoxaparin Sodium (LOVENOX) injection 30 mg, 30 mg, SubCUTAneous, Daily, Rebeca Hernandez DO    polyethylene glycol (GLYCOLAX) packet 17 g, 17 g, Oral, Daily PRN, Rebeca Hernandez DO    acetaminophen (TYLENOL) tablet 650 mg, 650 mg, Oral, Q6H PRN **OR** acetaminophen (TYLENOL) suppository 650 mg, 650 mg, Rectal, Q6H PRN, Rebeca Hernandez DO    metoprolol succinate (TOPROL XL) extended release tablet 25 mg, 25 mg, Oral, Daily, Rebeca Hernandez DO, 25 mg at 01/30/23 4426    sodium chloride flush 0.9 % injection 5-40 mL, 5-40 mL, IntraVENous, 2 times per day, Adelaida Ba DO, 10 mL at 01/30/23 2044    sodium chloride flush 0.9 % injection 5-40 mL, 5-40 mL, IntraVENous, PRN, Rebeca Hernandez DO    ondansetron (ZOFRAN-ODT) disintegrating tablet 4 mg, 4 mg, Oral, Q8H PRN **OR** ondansetron (ZOFRAN) injection 4 mg, 4 mg, IntraVENous, Q6H PRN, Rebeca Hernandez DO    Objective:    BP (!) 144/77   Pulse 66   Temp 97.4 °F (36.3 °C) (Temporal)   Resp 15   Ht 5' 1\" (1.549 m)   Wt 106 lb 0.7 oz (48.1 kg)   SpO2 100%   BMI 20.04 kg/m²     Heart: reg  Lungs:  ctab  Abd: + bs soft nontender  Extrem:  w/o edema    CBC with Differential:    Lab Results   Component Value Date/Time    WBC 6.2 01/29/2023 10:17 PM    RBC 4.12 01/29/2023 10:17 PM    HGB 13.0 01/29/2023 10:17 PM    HCT 39.1 01/29/2023 10:17 PM     01/29/2023 10:17 PM    MCV 94.9 01/29/2023 10:17 PM    MCH 31.6 01/29/2023 10:17 PM    MCHC 33.2 01/29/2023 10:17 PM    RDW 12.8 01/29/2023 10:17 PM    SEGSPCT 48 08/22/2012 01:10 AM    LYMPHOPCT 40.5 01/29/2023 10:17 PM    MONOPCT 6.3 01/29/2023 10:17 PM    BASOPCT 0.8 01/29/2023 10:17 PM    MONOSABS 0.39 01/29/2023 10:17 PM    LYMPHSABS 2.50 01/29/2023 10:17 PM    EOSABS 0.15 01/29/2023 10:17 PM    BASOSABS 0.05 01/29/2023 10:17 PM     CMP:    Lab Results   Component Value Date/Time     01/29/2023 10:17 PM    K 4.3 01/29/2023 10:17 PM    K 3.9 05/04/2021 04:24 PM    CL 97 01/29/2023 10:17 PM    CO2 22 01/29/2023 10:17 PM    BUN 14 01/29/2023 10:17 PM    CREATININE 0.8 01/29/2023 10:17 PM    GFRAA >60 08/24/2022 08:30 AM    LABGLOM >60 01/29/2023 10:17 PM    GLUCOSE 114 01/29/2023 10:17 PM    PROT 7.3 01/29/2023 10:17 PM    LABALBU 4.6 01/29/2023 10:17 PM    CALCIUM 9.7 01/29/2023 10:17 PM    BILITOT 0.3 01/29/2023 10:17 PM    ALKPHOS 96 01/29/2023 10:17 PM    AST 18 01/29/2023 10:17 PM    ALT 10 01/29/2023 10:17 PM     Warfarin PT/INR:    Lab Results   Component Value Date    INR 1.1 01/29/2023    INR 1.1 02/06/2022    INR 1.1 04/13/2020    PROTIME 12.4 01/29/2023    PROTIME 11.8 02/06/2022    PROTIME 11.9 04/13/2020       Assessment:    Hypertensive crisis / copd    Plan:  Dc home        [address], DO  6:27 AM  1/31/2023

## 2023-01-31 NOTE — PROGRESS NOTES
P Quality Flow/Interdisciplinary Rounds Progress Note        Quality Flow Rounds held on January 31, 2023    Disciplines Attending:  Bedside Nurse, , , and Nursing Unit Leadership    Juancarlos Mendes was admitted on 1/29/2023 10:06 PM    Anticipated Discharge Date:       Disposition:    Freeman Score:  Freeman Scale Score: 19    Readmission Risk              Risk of Unplanned Readmission:  11           Discussed patient goal for the day, patient clinical progression, and barriers to discharge.   The following Goal(s) of the Day/Commitment(s) have been identified:  Education/Learning - Alternate Learner      Flory Kilpatrick RN  January 31, 2023

## 2023-03-14 ENCOUNTER — TELEPHONE (OUTPATIENT)
Dept: CARDIOLOGY CLINIC | Age: 88
End: 2023-03-14

## 2023-03-15 ENCOUNTER — OFFICE VISIT (OUTPATIENT)
Dept: CARDIOLOGY CLINIC | Age: 88
End: 2023-03-15
Payer: MEDICARE

## 2023-03-15 VITALS
WEIGHT: 101.6 LBS | DIASTOLIC BLOOD PRESSURE: 80 MMHG | BODY MASS INDEX: 19.18 KG/M2 | OXYGEN SATURATION: 96 % | SYSTOLIC BLOOD PRESSURE: 140 MMHG | RESPIRATION RATE: 16 BRPM | HEART RATE: 75 BPM | HEIGHT: 61 IN

## 2023-03-15 DIAGNOSIS — Q21.12 PFO (PATENT FORAMEN OVALE): Primary | ICD-10-CM

## 2023-03-15 DIAGNOSIS — E78.49 OTHER HYPERLIPIDEMIA: ICD-10-CM

## 2023-03-15 DIAGNOSIS — I63.9 ACUTE ISCHEMIC STROKE (HCC): ICD-10-CM

## 2023-03-15 DIAGNOSIS — I16.0 HYPERTENSIVE URGENCY: ICD-10-CM

## 2023-03-15 PROCEDURE — 1090F PRES/ABSN URINE INCON ASSESS: CPT | Performed by: INTERNAL MEDICINE

## 2023-03-15 PROCEDURE — G8484 FLU IMMUNIZE NO ADMIN: HCPCS | Performed by: INTERNAL MEDICINE

## 2023-03-15 PROCEDURE — G8420 CALC BMI NORM PARAMETERS: HCPCS | Performed by: INTERNAL MEDICINE

## 2023-03-15 PROCEDURE — 99214 OFFICE O/P EST MOD 30 MIN: CPT | Performed by: INTERNAL MEDICINE

## 2023-03-15 PROCEDURE — G8427 DOCREV CUR MEDS BY ELIG CLIN: HCPCS | Performed by: INTERNAL MEDICINE

## 2023-03-15 PROCEDURE — 1036F TOBACCO NON-USER: CPT | Performed by: INTERNAL MEDICINE

## 2023-03-15 PROCEDURE — 93000 ELECTROCARDIOGRAM COMPLETE: CPT | Performed by: INTERNAL MEDICINE

## 2023-03-15 PROCEDURE — 1123F ACP DISCUSS/DSCN MKR DOCD: CPT | Performed by: INTERNAL MEDICINE

## 2023-03-15 RX ORDER — SUMATRIPTAN 100 MG/1
100 TABLET, FILM COATED ORAL
COMMUNITY

## 2023-03-15 RX ORDER — HYDRALAZINE HYDROCHLORIDE 10 MG/1
20 TABLET, FILM COATED ORAL 2 TIMES DAILY PRN
Qty: 90 TABLET | Refills: 3 | Status: SHIPPED | OUTPATIENT
Start: 2023-03-15

## 2023-03-15 NOTE — PROGRESS NOTES
CHIEF COMPLAINT:   Chief Complaint   Patient presents with    Hyperlipidemia     6 mo OV. palpitations after exercises. SOBOE -- has COPD. Denies other cardiac complaints. Heart Problem     PFO        HISTORY OF PRESENT ILLNESS: Patient is a 80 y.o. female presents for follow up 6 month visit. This pleasant, 70-year-old female has a history of mild AI, hypertension, dyslipidemia, hypothyroidism. She also had a meningioma. At today's office visit, She denies any acute chest pain, palpitations, dizziness, pedal edema. The patient is capable of activities of daily living. There is no dyspnea on more than moderate exertion. There is no orthopnea or PND. She is compliant diet and exercise regimen. Patient did have recent hospitalization on 1/29/2023 due to strokelike symptoms. Imaging studies in the ED including CTA head/neck and CT brain perfusion were unremarkable for acute stroke with no LVO or ischemic penumbra. No intracranial hemorrhage or other acute intracranial abnormalities on dry CT head as well. She was found to have hypertensive urgency on arrival.  Pressures responded to treatment. Patient was discharged after 2 days once medically stable. She does note that she was started on metoprolol. She notes that the medication has given her adverse reaction including pain across her chest.  She states that the pain feels like a tight rope across her chest that is tightening. Pain is intermittent and nonradiating. Nothing in particular makes it better or worse. It is moderate in severity. Patient self stopped her medication with improvement in her pain. She otherwise does admit to vague SOB but states it is fleeting and it has no particular association with exertion. She has otherwise been doing fairly well. She has unsteady gait at baseline and ambulates with a walker. Prior Cardiac workup:  Echocardiogram from 2/9/2022: Small PFO. EF 70%.   Grade 1 diastolic dysfunction PASP is 34 mmHg.  Lexiscan MPS, 8/2011. Normal perfusion. EF 70%  Lexiscan MPI from 9/25/2017: No defects. EF 77%  2.  Echocardiogram 06/01/2018. EF 65%. Stage I diastolic dysfunction. Normal left atrium. Mild AR. Normal estimated PA pressures.           Past Medical History:   Diagnosis Date    Acid reflux disease     Arthritis     Chronic back pain     COPD (chronic obstructive pulmonary disease) ([facility])     Hearing loss     Hyperlipidemia     Irritable bowel     Meningioma ([address] 05/2018    Migraine     Neuropathy     Osteoarthritis     Osteoporosis     Thyroid disease     hypo    TIA (transient ischemic attack) 05/2018       Allergies   Allergen Reactions    Tramadol Other (See Comments)     Diaphoresis, drop in B/P, weakness    Amlodipine Besylate     Bentyl [Dicyclomine Hcl]     Codeine     Lactose Intolerance (Gi)      Not completely intolerant    Mobic [Meloxicam]     Monosodium Glutamate     Other      Artificial Sweetners    Pantoprazole     Prednisone     Talwin [Pentazocine]        Current Outpatient Medications   Medication Sig Dispense Refill    SUMAtriptan (IMITREX) 100 MG tablet Take 100 mg by mouth once as needed for Migraine      hydrALAZINE (APRESOLINE) 10 MG tablet Take 2 tablets by mouth 2 times daily as needed (for pressures > 140/90) 90 tablet 3    acetaminophen (TYLENOL) 325 MG tablet Take 2 tablets by mouth every 6 hours as needed for Pain 120 tablet 3    aspirin 81 MG EC tablet Take 81 mg by mouth daily      docusate sodium (COLACE) 100 mg capsule Take 100 mg by mouth daily      albuterol sulfate  (90 Base) MCG/ACT inhaler Inhale 2 puffs into the lungs every 6 hours as needed for Wheezing or Shortness of Breath       Cholecalciferol (VITAMIN D3) 50 MCG (2000 UT) TABS Take 2,000 Units by mouth daily       budesonide (PULMICORT) 180 MCG/ACT AEPB inhaler Inhale 2 puffs into the lungs daily       levothyroxine (SYNTHROID) 50 MCG tablet Take 50 mcg by mouth Six times weekly Given Monday,Tuesday,Wednesday,,Friday,Saturday      dexlansoprazole (DEXILANT) 60 MG CPDR delayed release capsule Take 60 mg by mouth daily  (Patient not taking: Reported on 3/15/2023)       No current facility-administered medications for this visit. Social History     Socioeconomic History    Marital status:      Spouse name: Not on file    Number of children: Not on file    Years of education: Not on file    Highest education level: Not on file   Occupational History    Not on file   Tobacco Use    Smoking status: Former     Packs/day: 1.00     Types: Cigarettes     Start date: 1959     Quit date: 1996     Years since quittin.1    Smokeless tobacco: Never   Vaping Use    Vaping Use: Never used   Substance and Sexual Activity    Alcohol use: Never    Drug use: Never    Sexual activity: Never     Birth control/protection: Surgical     Comment: Hysterectomy   Other Topics Concern    Not on file   Social History Narrative    6 cups coffee daily     Social Determinants of Health     Financial Resource Strain: Not on file   Food Insecurity: Not on file   Transportation Needs: Not on file   Physical Activity: Not on file   Stress: Not on file   Social Connections: Not on file   Intimate Partner Violence: Not on file   Housing Stability: Not on file       Family History   Problem Relation Age of Onset    Cancer Mother         leukemia    Emphysema Father     Rheum Arthritis Sister     Cancer Brother     Cancer Brother     Heart Disease Brother        Review of Systems  Constitutional: Negative for fever, malaise/fatigue and weight loss. HENT: Negative for sore throat and tinnitus. Eyes: Negative for blurred vision and double vision. Respiratory: Negative for shortness of breath. Negative for cough and wheezing. Cardiovascular: As mentioned in HPI. Gastrointestinal: Negative for abdominal pain, heartburn, nausea and vomiting. Genitourinary: Negative.     Musculoskeletal: Negative for back pain, joint pain and myalgias. Neurological: Negative for dizziness, tremors, loss of consciousness and headaches. Endo/Heme/Allergies: Negative. Psychiatric/Behavioral: Negative for depression and suicidal ideas. Physical Exam   BP (!) 140/80 (Site: Left Upper Arm, Position: Sitting, Cuff Size: Medium Adult)   Pulse 75   Resp 16   Ht 5' 1\" (1.549 m)   Wt 101 lb 9.6 oz (46.1 kg)   SpO2 96%   BMI 19.20 kg/m²   Constitutional: Oriented to person, place, and time. Well-developed and well-nourished. No distress. Head: Normocephalic and atraumatic. Eyes: EOM are normal. Pupils are equal, round, and reactive to light. Neck: Normal range of motion. Neck supple. No hepatojugular reflux and no JVD present. Carotid bruit is not present. No tracheal deviation present. No thyromegaly present. Cardiovascular: Normal rate, regular rhythm, normal heart sounds and intact distal pulses. Exam reveals no gallop and no friction rub. No murmur heard. Pulmonary/Chest: Effort normal and breath sounds normal. No respiratory distress. No wheezes. No rales. No tenderness. Abdominal: Soft. Bowel sounds are normal. No distension and no mass. No tenderness. No rebound and no guarding. Musculoskeletal: Normal range of motion. No edema and no tenderness. Lymphadenopathy:   No cervical adenopathy. Neurological: Alert and oriented to person, place, and time. Skin: Skin is warm and dry. No rash noted. Not diaphoretic. No erythema. Psychiatric: Normal mood and affect. Behavior is normal.     EKG done in the office today, independently reviewed by me. NSR. LVPL. RSR'. Non specific ST-changes. ASSESSMENT:   Diagnosis Orders   1. PFO (patent foramen ovale)  EKG 12 lead      2. Hypertensive urgency        3. Acute ischemic stroke ([address]        4. Other hyperlipidemia               Plan:  1. Recurrent TIA with a PFO. She is currently on aspirin.   She is currently not taking statin therapy on account of intolerance in the form of myopathy. Lipid panel from 2/8/2022: 198/134/62/115. LDL is above goal.  Dietary counseling provided. Her last TIA was 2 years ago. Given her advanced age, there will not be much benefit from a PFO closure. We will continue to monitor her. 2.  Recent admission with hypertensive urgency. She has had episodes of hypertensive urgency's in the past as well. Unfortunately, she is intolerant to various medications including Norvasc and metoprolol. I will prescribe hydralazine to be used as needed-20 mg for pressures greater than 140/90. She will monitor pressures at home periodically  3. Otherwise, she appears euvolemic on examination. She will follow up with me in the office in 6 months         Mireille Norman MD  [facility]) Cardiology     NOTE: This report was transcribed using voice recognition software. Every effort was made to ensure accuracy; however, inadvertent computerized transcription errors may be present.

## 2023-03-15 NOTE — PATIENT INSTRUCTIONS
Continue all your medications at current doses. Take hydralazine 20 mg as needed for pressures > 140/90. Restrict sodium intake to less than 2-2.5 g/day. Restrict fluid intake to less than 2.2 L/day. Goal BP is less than 130/80. Please try to exercise for 150 minutes a week. I will see you back in the office in 6 months. Please call the office at ([phone], option [phone]) if you have any questions.

## 2023-05-04 ENCOUNTER — HOSPITAL ENCOUNTER (EMERGENCY)
Age: 88
Discharge: HOME | End: 2023-05-04
Attending: EMERGENCY MEDICINE
Payer: MEDICARE

## 2023-05-04 VITALS
HEART RATE: 76 BPM | SYSTOLIC BLOOD PRESSURE: 161 MMHG | TEMPERATURE: 98.1 F | HEIGHT: 60 IN | DIASTOLIC BLOOD PRESSURE: 86 MMHG | WEIGHT: 100.8 LBS | BODY MASS INDEX: 19.79 KG/M2 | RESPIRATION RATE: 16 BRPM | OXYGEN SATURATION: 95 %

## 2023-05-04 DIAGNOSIS — I10 HYPERTENSION, UNSPECIFIED TYPE: Primary | ICD-10-CM

## 2023-05-04 LAB
ERYTHROCYTE [DISTWIDTH] IN BLOOD BY AUTOMATED COUNT: 13.4 FL (ref 11.5–15)
HCT VFR BLD AUTO: 42.6 % (ref 34–48)
HGB BLD-MCNC: 14 G/DL (ref 11.5–15.5)
MCH RBC QN AUTO: 30.6 PG (ref 26–35)
MCHC RBC AUTO-ENTMCNC: 32.9 % (ref 32–34.5)
MCV RBC AUTO: 93.2 FL (ref 80–99.9)
PLATELET # BLD AUTO: 262 E9/L (ref 130–450)
PMV BLD AUTO: 9.4 FL (ref 7–12)
RBC # BLD AUTO: 4.57 E12/L (ref 3.5–5.5)
REASON FOR REJECTION: NORMAL
REJECTED TEST: NORMAL
WBC # BLD: 6.5 E9/L (ref 4.5–11.5)

## 2023-05-04 PROCEDURE — 85027 COMPLETE CBC AUTOMATED: CPT

## 2023-05-04 PROCEDURE — 6370000000 HC RX 637 (ALT 250 FOR IP): Performed by: EMERGENCY MEDICINE

## 2023-05-04 PROCEDURE — 93005 ELECTROCARDIOGRAM TRACING: CPT | Performed by: EMERGENCY MEDICINE

## 2023-05-04 PROCEDURE — 99283 EMERGENCY DEPT VISIT LOW MDM: CPT

## 2023-05-04 RX ORDER — HYDRALAZINE HYDROCHLORIDE 10 MG/1
10 TABLET, FILM COATED ORAL ONCE
Status: COMPLETED | OUTPATIENT
Start: 2023-05-04 | End: 2023-05-04

## 2023-05-04 RX ADMIN — HYDRALAZINE HYDROCHLORIDE 10 MG: 10 TABLET, FILM COATED ORAL at 14:00

## 2023-05-04 ASSESSMENT — PAIN - FUNCTIONAL ASSESSMENT: PAIN_FUNCTIONAL_ASSESSMENT: NONE - DENIES PAIN

## 2023-05-04 ASSESSMENT — LIFESTYLE VARIABLES
HOW MANY STANDARD DRINKS CONTAINING ALCOHOL DO YOU HAVE ON A TYPICAL DAY: PATIENT DOES NOT DRINK
HOW OFTEN DO YOU HAVE A DRINK CONTAINING ALCOHOL: NEVER

## 2023-05-04 NOTE — ED PROVIDER NOTES
HPI:  5/4/23,   Time: 12:37 PM EDT         Moira Hutton is a 80 y.o. female presenting to the ED for hypertension. She states she took her blood pressure at home and noticed it was very high. She was instructed by her doctor that she had markedly elevated blood pressure to take a medication she has at home she states she does not take it regularly and did not take it today. Denies symptoms as a result of her high blood pressure. Denies headache or blurred vision or numbness or weakness or chest pain or palpitations. Remote history of stroke 2 years ago. ROS:   Pertinent positives and negatives are stated within HPI, all other systems reviewed and are negative.  --------------------------------------------- PAST HISTORY ---------------------------------------------  Past Medical History:  has a past medical history of Acid reflux disease, Arthritis, Chronic back pain, COPD (chronic obstructive pulmonary disease) ([facility] [address] [address] Hearing loss, Hyperlipidemia, Irritable bowel, Meningioma ([facility] [address] [address] Migraine, Neuropathy, Osteoarthritis, Osteoporosis, Thyroid disease, and TIA (transient ischemic attack). Past Surgical History:  has a past surgical history that includes Hysterectomy; Tonsillectomy; Appendectomy; Thyroid surgery; Endoscopy, colon, diagnostic (8/2012); ERCP (10/31/2012); ERCP (12/05/2012); fracture surgery; eye surgery; Cholecystectomy; Fixation Kyphoplasty (08/03/2016); Breast surgery; Colonoscopy; Ankle surgery (1980); Breast biopsy (1978); pr office/outpt visit,procedure only (N/A, 8/1/2018); and blepharoplasty. Social History:  reports that she quit smoking about 27 years ago. Her smoking use included cigarettes. She started smoking about 63 years ago. She smoked an average of 1 pack per day. She has never used smokeless tobacco. She reports that she does not drink alcohol and does not use drugs.     Family History: family history includes Cancer in her brother, brother, and mother; Emphysema in her father;

## 2023-05-05 LAB
EKG ATRIAL RATE: 79 BPM
EKG P AXIS: 106 DEGREES
EKG P-R INTERVAL: 164 MS
EKG Q-T INTERVAL: 356 MS
EKG QRS DURATION: 70 MS
EKG QTC CALCULATION (BAZETT): 408 MS
EKG R AXIS: -19 DEGREES
EKG T AXIS: 48 DEGREES
EKG VENTRICULAR RATE: 79 BPM

## 2023-05-05 PROCEDURE — 93010 ELECTROCARDIOGRAM REPORT: CPT | Performed by: INTERNAL MEDICINE

## 2023-09-24 ENCOUNTER — HOSPITAL ENCOUNTER (EMERGENCY)
Age: 88
Discharge: HOME | End: 2023-09-24
Attending: EMERGENCY MEDICINE
Payer: MEDICARE

## 2023-09-24 ENCOUNTER — APPOINTMENT (OUTPATIENT)
Dept: CT IMAGING | Age: 88
End: 2023-09-24
Payer: MEDICARE

## 2023-09-24 VITALS
OXYGEN SATURATION: 96 % | HEART RATE: 84 BPM | HEIGHT: 61 IN | SYSTOLIC BLOOD PRESSURE: 158 MMHG | BODY MASS INDEX: 20.39 KG/M2 | WEIGHT: 108 LBS | TEMPERATURE: 97.9 F | DIASTOLIC BLOOD PRESSURE: 82 MMHG | RESPIRATION RATE: 19 BRPM

## 2023-09-24 DIAGNOSIS — R07.81 RIB PAIN: Primary | ICD-10-CM

## 2023-09-24 LAB
ANION GAP SERPL CALCULATED.3IONS-SCNC: 10 MMOL/L (ref 7–16)
BASOPHILS # BLD: 0.03 K/UL (ref 0–0.2)
BASOPHILS NFR BLD: 1 % (ref 0–2)
BUN SERPL-MCNC: 8 MG/DL (ref 6–23)
CALCIUM SERPL-MCNC: 9.9 MG/DL (ref 8.6–10.2)
CHLORIDE SERPL-SCNC: 101 MMOL/L (ref 98–107)
CO2 SERPL-SCNC: 25 MMOL/L (ref 22–29)
CREAT SERPL-MCNC: 0.9 MG/DL (ref 0.5–1)
EKG ATRIAL RATE: 83 BPM
EKG P AXIS: 52 DEGREES
EKG P-R INTERVAL: 168 MS
EKG Q-T INTERVAL: 364 MS
EKG QRS DURATION: 80 MS
EKG QTC CALCULATION (BAZETT): 427 MS
EKG R AXIS: -2 DEGREES
EKG T AXIS: 39 DEGREES
EKG VENTRICULAR RATE: 83 BPM
EOSINOPHIL # BLD: 0.02 K/UL (ref 0.05–0.5)
EOSINOPHILS RELATIVE PERCENT: 0 % (ref 0–6)
ERYTHROCYTE [DISTWIDTH] IN BLOOD BY AUTOMATED COUNT: 12.5 % (ref 11.5–15)
GFR SERPL CREATININE-BSD FRML MDRD: >60 ML/MIN/1.73M2
GLUCOSE SERPL-MCNC: 118 MG/DL (ref 74–99)
HCT VFR BLD AUTO: 39.2 % (ref 34–48)
HGB BLD-MCNC: 13.1 G/DL (ref 11.5–15.5)
IMM GRANULOCYTES # BLD AUTO: <0.03 K/UL (ref 0–0.58)
IMM GRANULOCYTES NFR BLD: 0 % (ref 0–5)
LYMPHOCYTES NFR BLD: 0.88 K/UL (ref 1.5–4)
LYMPHOCYTES RELATIVE PERCENT: 17 % (ref 20–42)
MCH RBC QN AUTO: 30.8 PG (ref 26–35)
MCHC RBC AUTO-ENTMCNC: 33.4 G/DL (ref 32–34.5)
MCV RBC AUTO: 92 FL (ref 80–99.9)
MONOCYTES NFR BLD: 0.29 K/UL (ref 0.1–0.95)
MONOCYTES NFR BLD: 6 % (ref 2–12)
NEUTROPHILS NFR BLD: 76 % (ref 43–80)
NEUTS SEG NFR BLD: 3.9 K/UL (ref 1.8–7.3)
PLATELET # BLD AUTO: 215 K/UL (ref 130–450)
PMV BLD AUTO: 9.4 FL (ref 7–12)
POTASSIUM SERPL-SCNC: 4.1 MMOL/L (ref 3.5–5)
RBC # BLD AUTO: 4.26 M/UL (ref 3.5–5.5)
SARS-COV-2 RDRP RESP QL NAA+PROBE: NOT DETECTED
SODIUM SERPL-SCNC: 136 MMOL/L (ref 132–146)
SPECIMEN DESCRIPTION: NORMAL
TROPONIN I SERPL HS-MCNC: 15 NG/L (ref 0–9)
TROPONIN I SERPL HS-MCNC: 16 NG/L (ref 0–9)
WBC OTHER # BLD: 5.1 K/UL (ref 4.5–11.5)

## 2023-09-24 PROCEDURE — 87635 SARS-COV-2 COVID-19 AMP PRB: CPT

## 2023-09-24 PROCEDURE — 85025 COMPLETE CBC W/AUTO DIFF WBC: CPT

## 2023-09-24 PROCEDURE — 93005 ELECTROCARDIOGRAM TRACING: CPT

## 2023-09-24 PROCEDURE — 84484 ASSAY OF TROPONIN QUANT: CPT

## 2023-09-24 PROCEDURE — 6370000000 HC RX 637 (ALT 250 FOR IP)

## 2023-09-24 PROCEDURE — 80048 BASIC METABOLIC PNL TOTAL CA: CPT

## 2023-09-24 PROCEDURE — 93010 ELECTROCARDIOGRAM REPORT: CPT | Performed by: INTERNAL MEDICINE

## 2023-09-24 PROCEDURE — 71250 CT THORAX DX C-: CPT

## 2023-09-24 PROCEDURE — 99284 EMERGENCY DEPT VISIT MOD MDM: CPT

## 2023-09-24 RX ORDER — LIDOCAINE 4 G/G
1 PATCH TOPICAL DAILY
Status: DISCONTINUED | OUTPATIENT
Start: 2023-09-24 | End: 2023-09-24 | Stop reason: HOSPADM

## 2023-09-24 RX ORDER — LIDOCAINE 4 G/G
1 PATCH TOPICAL DAILY
Qty: 30 PATCH | Refills: 0 | Status: SHIPPED | OUTPATIENT
Start: 2023-09-24 | End: 2023-10-24

## 2023-09-24 RX ORDER — ACETAMINOPHEN 500 MG
1000 TABLET ORAL ONCE
Status: COMPLETED | OUTPATIENT
Start: 2023-09-24 | End: 2023-09-24

## 2023-09-24 RX ADMIN — ACETAMINOPHEN 1000 MG: 500 TABLET ORAL at 09:55

## 2023-09-24 ASSESSMENT — PAIN DESCRIPTION - LOCATION
LOCATION: SHOULDER;BACK
LOCATION: BACK

## 2023-09-24 ASSESSMENT — PAIN DESCRIPTION - ORIENTATION: ORIENTATION: RIGHT;LEFT

## 2023-09-24 ASSESSMENT — PAIN SCALES - GENERAL: PAINLEVEL_OUTOF10: 10

## 2023-09-24 NOTE — ED PROVIDER NOTES
[facility]        Pt Name: Parish Lopez  MRN: [id number]  [address] [facility] 12/22/1927  Date of evaluation: 9/24/2023  Provider: Arcelia Shen DO  PCP: Ginny Pena MD  Note Started: 9:01 AM EDT 9/24/23    CHIEF COMPLAINT       Chief Complaint   Patient presents with    Back Pain     Between shoulder blades No known injury       HISTORY OF PRESENT ILLNESS: 1 or more Elements   History From: patient    Limitations to history : None    Parish Lopez is a 80 y.o. female who presents for right posterior rib pain that started yesterday. Patient reports she has had an intermittent cough for the past couple days and is now woke up this morning experiencing right posterior rib pain. She has a history of rib fractures. No sick contacts. Patient denies fever, chills, headache, shortness of breath, abdominal pain, nausea, vomiting, diarrhea, lightheadedness, dysuria, hematuria, hematochezia, and melena. She wanted to go to urgent care but the are unable to perform any imaging today so they came to the emergency department for evaluation. No fall or trauma. Nursing Notes were all reviewed and agreed with or any disagreements were addressed in the HPI. REVIEW OF SYSTEMS :           Positives and Pertinent negatives as per HPI.      SURGICAL HISTORY     Past Surgical History:   Procedure Laterality Date    ANKLE SURGERY  1980    pin placement    APPENDECTOMY      BLEPHAROPLASTY      BREAST BIOPSY  1978    BREAST SURGERY      biopsy bilat     CHOLECYSTECTOMY      COLONOSCOPY      ENDOSCOPY, COLON, DIAGNOSTIC  8/2012    ERCP  10/31/2012    ERCP  12/05/2012    stent removal    EYE SURGERY      cataract    FIXATION KYPHOPLASTY  08/03/2016    LUMBAR 2    FRACTURE SURGERY      left ankle, pin placement    HYSTERECTOMY (CERVIX STATUS UNKNOWN)      [facility] OFFICE/OUTPT VISIT,PROCEDURE ONLY N/A 8/1/2018    KYPHOPLASTY  T11  -- TIME UNDETERMINED

## 2023-10-30 RX ORDER — HYDRALAZINE HYDROCHLORIDE 10 MG/1
TABLET, FILM COATED ORAL
Qty: 90 TABLET | Refills: 0 | Status: SHIPPED | OUTPATIENT
Start: 2023-10-30

## 2024-02-20 ENCOUNTER — HOSPITAL ENCOUNTER (OUTPATIENT)
Age: 89
Discharge: HOME | End: 2024-02-22

## 2024-02-20 PROCEDURE — 87070 CULTURE OTHR SPECIMN AEROBIC: CPT

## 2024-02-20 PROCEDURE — 87205 SMEAR GRAM STAIN: CPT

## 2024-02-20 PROCEDURE — 87102 FUNGUS ISOLATION CULTURE: CPT

## 2024-02-21 LAB
MICROORGANISM SPEC CULT: ABNORMAL
MICROORGANISM/AGENT SPEC: ABNORMAL
SPECIMEN DESCRIPTION: ABNORMAL

## 2024-02-23 LAB
MICROORGANISM SPEC CULT: NORMAL
MICROORGANISM/AGENT SPEC: NORMAL
SPECIMEN DESCRIPTION: NORMAL

## 2024-05-14 ENCOUNTER — HOSPITAL ENCOUNTER (OUTPATIENT)
Age: 89
Setting detail: OBSERVATION
Discharge: HOME | End: 2024-05-15
Attending: EMERGENCY MEDICINE | Admitting: INTERNAL MEDICINE
Payer: MEDICARE

## 2024-05-14 ENCOUNTER — APPOINTMENT (OUTPATIENT)
Dept: GENERAL RADIOLOGY | Age: 89
End: 2024-05-14
Payer: MEDICARE

## 2024-05-14 ENCOUNTER — APPOINTMENT (OUTPATIENT)
Dept: MRI IMAGING | Age: 89
End: 2024-05-14
Payer: MEDICARE

## 2024-05-14 ENCOUNTER — APPOINTMENT (OUTPATIENT)
Dept: CT IMAGING | Age: 89
End: 2024-05-14
Attending: EMERGENCY MEDICINE
Payer: MEDICARE

## 2024-05-14 DIAGNOSIS — I63.9 CEREBROVASCULAR ACCIDENT (CVA), UNSPECIFIED MECHANISM (HCC): Primary | ICD-10-CM

## 2024-05-14 LAB
ALBUMIN SERPL-MCNC: 4.8 G/DL (ref 3.5–5.2)
ALP SERPL-CCNC: 84 U/L (ref 35–104)
ALT SERPL-CCNC: 6 U/L (ref 0–32)
ANION GAP SERPL CALCULATED.3IONS-SCNC: 12 MMOL/L (ref 7–16)
AST SERPL-CCNC: 16 U/L (ref 0–31)
BASOPHILS # BLD: 0.06 K/UL (ref 0–0.2)
BASOPHILS NFR BLD: 1 % (ref 0–2)
BILIRUB SERPL-MCNC: 0.5 MG/DL (ref 0–1.2)
BUN SERPL-MCNC: 11 MG/DL (ref 6–23)
CALCIUM SERPL-MCNC: 9.9 MG/DL (ref 8.6–10.2)
CHLORIDE SERPL-SCNC: 99 MMOL/L (ref 98–107)
CO2 SERPL-SCNC: 24 MMOL/L (ref 22–29)
CREAT SERPL-MCNC: 0.7 MG/DL (ref 0.5–1)
EKG ATRIAL RATE: 78 BPM
EKG P AXIS: 69 DEGREES
EKG P-R INTERVAL: 168 MS
EKG Q-T INTERVAL: 360 MS
EKG QRS DURATION: 70 MS
EKG QTC CALCULATION (BAZETT): 410 MS
EKG R AXIS: -37 DEGREES
EKG T AXIS: 72 DEGREES
EKG VENTRICULAR RATE: 78 BPM
EOSINOPHIL # BLD: 0.05 K/UL (ref 0.05–0.5)
EOSINOPHILS RELATIVE PERCENT: 1 % (ref 0–6)
ERYTHROCYTE [DISTWIDTH] IN BLOOD BY AUTOMATED COUNT: 12.4 % (ref 11.5–15)
GFR, ESTIMATED: 80 ML/MIN/1.73M2
GLUCOSE SERPL-MCNC: 106 MG/DL (ref 74–99)
HCT VFR BLD AUTO: 39.3 % (ref 34–48)
HGB BLD-MCNC: 12.8 G/DL (ref 11.5–15.5)
IMM GRANULOCYTES # BLD AUTO: <0.03 K/UL (ref 0–0.58)
IMM GRANULOCYTES NFR BLD: 0 % (ref 0–5)
LYMPHOCYTES NFR BLD: 1.33 K/UL (ref 1.5–4)
LYMPHOCYTES RELATIVE PERCENT: 31 % (ref 20–42)
MCH RBC QN AUTO: 30.4 PG (ref 26–35)
MCHC RBC AUTO-ENTMCNC: 32.6 G/DL (ref 32–34.5)
MCV RBC AUTO: 93.3 FL (ref 80–99.9)
MONOCYTES NFR BLD: 0.24 K/UL (ref 0.1–0.95)
MONOCYTES NFR BLD: 6 % (ref 2–12)
NEUTROPHILS NFR BLD: 60 % (ref 43–80)
NEUTS SEG NFR BLD: 2.54 K/UL (ref 1.8–7.3)
PLATELET # BLD AUTO: 236 K/UL (ref 130–450)
PMV BLD AUTO: 9.5 FL (ref 7–12)
POTASSIUM SERPL-SCNC: 5.3 MMOL/L (ref 3.5–5)
PROT SERPL-MCNC: 7 G/DL (ref 6.4–8.3)
RBC # BLD AUTO: 4.21 M/UL (ref 3.5–5.5)
SODIUM SERPL-SCNC: 135 MMOL/L (ref 132–146)
TROPONIN I SERPL HS-MCNC: 15 NG/L (ref 0–9)
TROPONIN I SERPL HS-MCNC: 18 NG/L (ref 0–9)
WBC OTHER # BLD: 4.2 K/UL (ref 4.5–11.5)

## 2024-05-14 PROCEDURE — 6370000000 HC RX 637 (ALT 250 FOR IP): Performed by: EMERGENCY MEDICINE

## 2024-05-14 PROCEDURE — 96372 THER/PROPH/DIAG INJ SC/IM: CPT

## 2024-05-14 PROCEDURE — G0378 HOSPITAL OBSERVATION PER HR: HCPCS

## 2024-05-14 PROCEDURE — 2580000003 HC RX 258: Performed by: INTERNAL MEDICINE

## 2024-05-14 PROCEDURE — 96374 THER/PROPH/DIAG INJ IV PUSH: CPT

## 2024-05-14 PROCEDURE — 94664 DEMO&/EVAL PT USE INHALER: CPT

## 2024-05-14 PROCEDURE — 6360000002 HC RX W HCPCS: Performed by: INTERNAL MEDICINE

## 2024-05-14 PROCEDURE — 93005 ELECTROCARDIOGRAM TRACING: CPT | Performed by: EMERGENCY MEDICINE

## 2024-05-14 PROCEDURE — 80053 COMPREHEN METABOLIC PANEL: CPT

## 2024-05-14 PROCEDURE — 99285 EMERGENCY DEPT VISIT HI MDM: CPT

## 2024-05-14 PROCEDURE — 84484 ASSAY OF TROPONIN QUANT: CPT

## 2024-05-14 PROCEDURE — 70450 CT HEAD/BRAIN W/O DYE: CPT

## 2024-05-14 PROCEDURE — 6360000002 HC RX W HCPCS: Performed by: EMERGENCY MEDICINE

## 2024-05-14 PROCEDURE — 85025 COMPLETE CBC W/AUTO DIFF WBC: CPT

## 2024-05-14 PROCEDURE — 96375 TX/PRO/DX INJ NEW DRUG ADDON: CPT

## 2024-05-14 PROCEDURE — 70551 MRI BRAIN STEM W/O DYE: CPT

## 2024-05-14 PROCEDURE — 93010 ELECTROCARDIOGRAM REPORT: CPT | Performed by: INTERNAL MEDICINE

## 2024-05-14 PROCEDURE — 71045 X-RAY EXAM CHEST 1 VIEW: CPT

## 2024-05-14 RX ORDER — ALBUTEROL SULFATE 2.5 MG/3ML
2.5 SOLUTION RESPIRATORY (INHALATION) EVERY 6 HOURS PRN
Status: DISCONTINUED | OUTPATIENT
Start: 2024-05-14 | End: 2024-05-15 | Stop reason: HOSPADM

## 2024-05-14 RX ORDER — DOCUSATE SODIUM 100 MG/1
100 CAPSULE, LIQUID FILLED ORAL DAILY
Status: DISCONTINUED | OUTPATIENT
Start: 2024-05-14 | End: 2024-05-15 | Stop reason: HOSPADM

## 2024-05-14 RX ORDER — LORAZEPAM 2 MG/ML
0.5 INJECTION INTRAMUSCULAR
Status: COMPLETED | OUTPATIENT
Start: 2024-05-14 | End: 2024-05-14

## 2024-05-14 RX ORDER — SUMATRIPTAN 50 MG/1
100 TABLET, FILM COATED ORAL
Status: DISCONTINUED | OUTPATIENT
Start: 2024-05-14 | End: 2024-05-15 | Stop reason: HOSPADM

## 2024-05-14 RX ORDER — ACETAMINOPHEN 160 MG/5ML
650 LIQUID ORAL ONCE
Status: COMPLETED | OUTPATIENT
Start: 2024-05-14 | End: 2024-05-14

## 2024-05-14 RX ORDER — SODIUM CHLORIDE 0.9 % (FLUSH) 0.9 %
5-40 SYRINGE (ML) INJECTION PRN
Status: DISCONTINUED | OUTPATIENT
Start: 2024-05-14 | End: 2024-05-15 | Stop reason: HOSPADM

## 2024-05-14 RX ORDER — ALBUTEROL SULFATE 90 UG/1
2 AEROSOL, METERED RESPIRATORY (INHALATION) EVERY 6 HOURS PRN
Status: DISCONTINUED | OUTPATIENT
Start: 2024-05-14 | End: 2024-05-14 | Stop reason: CLARIF

## 2024-05-14 RX ORDER — MAGNESIUM SULFATE IN WATER 40 MG/ML
2000 INJECTION, SOLUTION INTRAVENOUS PRN
Status: DISCONTINUED | OUTPATIENT
Start: 2024-05-14 | End: 2024-05-15 | Stop reason: HOSPADM

## 2024-05-14 RX ORDER — ACETAMINOPHEN 650 MG/1
650 SUPPOSITORY RECTAL EVERY 6 HOURS PRN
Status: DISCONTINUED | OUTPATIENT
Start: 2024-05-14 | End: 2024-05-15 | Stop reason: HOSPADM

## 2024-05-14 RX ORDER — BUDESONIDE 0.25 MG/2ML
0.25 INHALANT ORAL 2 TIMES DAILY
Status: DISCONTINUED | OUTPATIENT
Start: 2024-05-14 | End: 2024-05-15 | Stop reason: HOSPADM

## 2024-05-14 RX ORDER — ONDANSETRON 4 MG/1
4 TABLET, ORALLY DISINTEGRATING ORAL EVERY 8 HOURS PRN
Status: DISCONTINUED | OUTPATIENT
Start: 2024-05-14 | End: 2024-05-15 | Stop reason: HOSPADM

## 2024-05-14 RX ORDER — POTASSIUM CHLORIDE 20 MEQ/1
40 TABLET, EXTENDED RELEASE ORAL PRN
Status: DISCONTINUED | OUTPATIENT
Start: 2024-05-14 | End: 2024-05-15 | Stop reason: HOSPADM

## 2024-05-14 RX ORDER — LEVOTHYROXINE SODIUM 0.03 MG/1
50 TABLET ORAL DAILY
Status: DISCONTINUED | OUTPATIENT
Start: 2024-05-15 | End: 2024-05-15 | Stop reason: HOSPADM

## 2024-05-14 RX ORDER — HYDRALAZINE HYDROCHLORIDE 20 MG/ML
5 INJECTION INTRAMUSCULAR; INTRAVENOUS EVERY 6 HOURS PRN
Status: DISCONTINUED | OUTPATIENT
Start: 2024-05-14 | End: 2024-05-15 | Stop reason: HOSPADM

## 2024-05-14 RX ORDER — ACETAMINOPHEN 325 MG/1
650 TABLET ORAL EVERY 6 HOURS PRN
Status: DISCONTINUED | OUTPATIENT
Start: 2024-05-14 | End: 2024-05-15 | Stop reason: HOSPADM

## 2024-05-14 RX ORDER — POTASSIUM CHLORIDE 7.45 MG/ML
10 INJECTION INTRAVENOUS PRN
Status: DISCONTINUED | OUTPATIENT
Start: 2024-05-14 | End: 2024-05-15 | Stop reason: HOSPADM

## 2024-05-14 RX ORDER — ENOXAPARIN SODIUM 100 MG/ML
30 INJECTION SUBCUTANEOUS DAILY
Status: DISCONTINUED | OUTPATIENT
Start: 2024-05-14 | End: 2024-05-15 | Stop reason: HOSPADM

## 2024-05-14 RX ORDER — SODIUM CHLORIDE 9 MG/ML
INJECTION, SOLUTION INTRAVENOUS PRN
Status: DISCONTINUED | OUTPATIENT
Start: 2024-05-14 | End: 2024-05-15 | Stop reason: HOSPADM

## 2024-05-14 RX ORDER — ONDANSETRON 2 MG/ML
4 INJECTION INTRAMUSCULAR; INTRAVENOUS EVERY 6 HOURS PRN
Status: DISCONTINUED | OUTPATIENT
Start: 2024-05-14 | End: 2024-05-15 | Stop reason: HOSPADM

## 2024-05-14 RX ORDER — ASPIRIN 81 MG/1
81 TABLET ORAL DAILY
Status: DISCONTINUED | OUTPATIENT
Start: 2024-05-14 | End: 2024-05-15

## 2024-05-14 RX ORDER — SODIUM CHLORIDE 0.9 % (FLUSH) 0.9 %
5-40 SYRINGE (ML) INJECTION EVERY 12 HOURS SCHEDULED
Status: DISCONTINUED | OUTPATIENT
Start: 2024-05-14 | End: 2024-05-15 | Stop reason: HOSPADM

## 2024-05-14 RX ORDER — POLYETHYLENE GLYCOL 3350 17 G/17G
17 POWDER, FOR SOLUTION ORAL DAILY PRN
Status: DISCONTINUED | OUTPATIENT
Start: 2024-05-14 | End: 2024-05-15 | Stop reason: HOSPADM

## 2024-05-14 RX ORDER — HYDRALAZINE HYDROCHLORIDE 20 MG/ML
5 INJECTION INTRAMUSCULAR; INTRAVENOUS ONCE
Status: COMPLETED | OUTPATIENT
Start: 2024-05-14 | End: 2024-05-14

## 2024-05-14 RX ADMIN — ENOXAPARIN SODIUM 30 MG: 100 INJECTION SUBCUTANEOUS at 20:22

## 2024-05-14 RX ADMIN — ACETAMINOPHEN 650 MG: 650 SOLUTION ORAL at 12:20

## 2024-05-14 RX ADMIN — BUDESONIDE 250 MCG: 0.25 SUSPENSION RESPIRATORY (INHALATION) at 20:56

## 2024-05-14 RX ADMIN — HYDRALAZINE HYDROCHLORIDE 5 MG: 20 INJECTION INTRAMUSCULAR; INTRAVENOUS at 11:00

## 2024-05-14 RX ADMIN — SODIUM CHLORIDE, PRESERVATIVE FREE 10 ML: 5 INJECTION INTRAVENOUS at 22:32

## 2024-05-14 RX ADMIN — LORAZEPAM 0.5 MG: 2 INJECTION INTRAMUSCULAR; INTRAVENOUS at 18:08

## 2024-05-14 ASSESSMENT — PAIN - FUNCTIONAL ASSESSMENT: PAIN_FUNCTIONAL_ASSESSMENT: NONE - DENIES PAIN

## 2024-05-14 NOTE — ED NOTES
Patient gown & robe saturated w urine due to leakage of Pure Wick.  Complete linen change, bed bath and hospital gown applied. Warm blankets applied.  Daughter at bedside & taking home wet clothing.

## 2024-05-14 NOTE — ED PROVIDER NOTES
Value Ref Range    Troponin, High Sensitivity 18 (H) 0 - 9 ng/L   EKG 12 Lead   Result Value Ref Range    Ventricular Rate 78 BPM    Atrial Rate 78 BPM    P-R Interval 168 ms    QRS Duration 70 ms    Q-T Interval 360 ms    QTc Calculation (Bazett) 410 ms    P Axis 69 degrees    R Axis -37 degrees    T Axis 72 degrees     EKG:  This EKG is signed and interpreted by me.    Rate: 78  Rhythm: Sinus  Interpretation: Anterior T wave inversion with left axis deviation  Comparison: stable as compared to patient's most recent EKG      RADIOLOGY:  Interpreted by Radiologist.  CT HEAD WO CONTRAST   Final Result   1. No acute intracranial abnormality.   2. Moderate cerebral atrophy with periventricular white matter changes.   3. Nonspecific white matter changes, likely related to chronic microvascular   ischemic disease.         XR CHEST PORTABLE   Final Result   Mild chronic interstitial lung changes with no acute cardiopulmonary disease.         MRI BRAIN WO CONTRAST    (Results Pending)       ------------------------- NURSING NOTES AND VITALS REVIEWED ---------------------------   The nursing notes within the ED encounter and vital signs as below have been reviewed.   BP (!) 148/75   Pulse 88   Temp 98.2 °F (36.8 °C)   Resp 19   Ht 1.524 m (5')   Wt 49.9 kg (110 lb)   SpO2 96%   BMI 21.48 kg/m²   Oxygen Saturation Interpretation: Normal      ---------------------------------------------------PHYSICAL EXAM--------------------------------------      Constitutional/General: Alert and oriented x3, well appearing, non toxic in NAD  Head: NC/AT  Eyes: PERRL, EOMI  Mouth: Oropharynx clear, handling secretions, no trismus  Neck: Supple, full ROM, no meningeal signs  Pulmonary: Lungs clear to auscultation bilaterally, no wheezes, rales, or rhonchi. Not in respiratory distress  Cardiovascular:  Regular rate and rhythm, no murmurs, gallops, or rubs. 2+ distal pulses  Abdomen: Soft, non tender, non distended,   Extremities: Moves

## 2024-05-15 VITALS
OXYGEN SATURATION: 94 % | RESPIRATION RATE: 16 BRPM | SYSTOLIC BLOOD PRESSURE: 143 MMHG | WEIGHT: 110 LBS | BODY MASS INDEX: 21.6 KG/M2 | HEART RATE: 81 BPM | TEMPERATURE: 97.9 F | HEIGHT: 60 IN | DIASTOLIC BLOOD PRESSURE: 71 MMHG

## 2024-05-15 PROBLEM — R29.818 TRANSIENT NEUROLOGICAL SYMPTOMS: Status: ACTIVE | Noted: 2023-01-29

## 2024-05-15 LAB
ANION GAP SERPL CALCULATED.3IONS-SCNC: 13 MMOL/L (ref 7–16)
BUN SERPL-MCNC: 10 MG/DL (ref 6–23)
CALCIUM SERPL-MCNC: 9.4 MG/DL (ref 8.6–10.2)
CHLORIDE SERPL-SCNC: 101 MMOL/L (ref 98–107)
CO2 SERPL-SCNC: 21 MMOL/L (ref 22–29)
CREAT SERPL-MCNC: 0.6 MG/DL (ref 0.5–1)
GFR, ESTIMATED: 81 ML/MIN/1.73M2
GLUCOSE SERPL-MCNC: 93 MG/DL (ref 74–99)
POTASSIUM SERPL-SCNC: 4.1 MMOL/L (ref 3.5–5)
SODIUM SERPL-SCNC: 135 MMOL/L (ref 132–146)

## 2024-05-15 PROCEDURE — G0378 HOSPITAL OBSERVATION PER HR: HCPCS

## 2024-05-15 PROCEDURE — 99222 1ST HOSP IP/OBS MODERATE 55: CPT | Performed by: PSYCHIATRY & NEUROLOGY

## 2024-05-15 PROCEDURE — 6360000002 HC RX W HCPCS: Performed by: INTERNAL MEDICINE

## 2024-05-15 PROCEDURE — 96372 THER/PROPH/DIAG INJ SC/IM: CPT

## 2024-05-15 PROCEDURE — 80048 BASIC METABOLIC PNL TOTAL CA: CPT

## 2024-05-15 PROCEDURE — 36415 COLL VENOUS BLD VENIPUNCTURE: CPT

## 2024-05-15 PROCEDURE — 94640 AIRWAY INHALATION TREATMENT: CPT

## 2024-05-15 PROCEDURE — 97161 PT EVAL LOW COMPLEX 20 MIN: CPT

## 2024-05-15 PROCEDURE — 97165 OT EVAL LOW COMPLEX 30 MIN: CPT

## 2024-05-15 PROCEDURE — 2580000003 HC RX 258: Performed by: INTERNAL MEDICINE

## 2024-05-15 PROCEDURE — 97535 SELF CARE MNGMENT TRAINING: CPT

## 2024-05-15 PROCEDURE — 6370000000 HC RX 637 (ALT 250 FOR IP): Performed by: INTERNAL MEDICINE

## 2024-05-15 RX ORDER — HYDRALAZINE HYDROCHLORIDE 10 MG/1
10 TABLET, FILM COATED ORAL 2 TIMES DAILY PRN
Qty: 90 TABLET | Refills: 0
Start: 2024-05-15

## 2024-05-15 RX ORDER — HYDRALAZINE HYDROCHLORIDE 10 MG/1
10 TABLET, FILM COATED ORAL 2 TIMES DAILY
Qty: 90 TABLET | Refills: 0
Start: 2024-05-15 | End: 2025-05-15

## 2024-05-15 RX ORDER — ASPIRIN 81 MG/1
81 TABLET ORAL DAILY
Status: DISCONTINUED | OUTPATIENT
Start: 2024-05-16 | End: 2024-05-15 | Stop reason: HOSPADM

## 2024-05-15 RX ADMIN — BUDESONIDE 250 MCG: 0.25 SUSPENSION RESPIRATORY (INHALATION) at 08:58

## 2024-05-15 RX ADMIN — LEVOTHYROXINE SODIUM 50 MCG: 0.03 TABLET ORAL at 05:25

## 2024-05-15 RX ADMIN — ENOXAPARIN SODIUM 30 MG: 100 INJECTION SUBCUTANEOUS at 08:42

## 2024-05-15 RX ADMIN — SODIUM CHLORIDE, PRESERVATIVE FREE 10 ML: 5 INJECTION INTRAVENOUS at 08:42

## 2024-05-15 RX ADMIN — ASPIRIN 81 MG: 81 TABLET, COATED ORAL at 08:42

## 2024-05-15 ASSESSMENT — PAIN SCALES - GENERAL
PAINLEVEL_OUTOF10: 0
PAINLEVEL_OUTOF10: 0

## 2024-05-15 NOTE — PLAN OF CARE
Problem: Chronic Conditions and Co-morbidities  Goal: Patient's chronic conditions and co-morbidity symptoms are monitored and maintained or improved  Outcome: Progressing     Problem: Discharge Planning  Goal: Discharge to home or other facility with appropriate resources  Outcome: Progressing  Flowsheets (Taken 5/14/2024 1944)  Discharge to home or other facility with appropriate resources: Refer to discharge planning if patient needs post-hospital services based on physician order or complex needs related to functional status, cognitive ability or social support system     Problem: Safety - Adult  Goal: Free from fall injury  Outcome: Progressing     Problem: ABCDS Injury Assessment  Goal: Absence of physical injury  Outcome: Progressing

## 2024-05-15 NOTE — CARE COORDINATION
Sherley Dc LSW    Case Management     Care Coordination     Signed     Date of Service: 5/15/2024 10:38 AM     Signed          Pt lives alone in a condo that [place] from her daughter who is retired.  Pt has 2 daughters in [place] and [place]. She had a son that passed away in 2020 from kidney cancer. Pt still drives and is independent with all adl's but her daughter cooks dinner. No hhc pta. Her pcp is dr. Bacon whom she has a appt on Friday and sees every 3 months. Pt has and uses at times a fww, cane, has a w/c, shower chair with bars and is a walk in shower. Pt is not diabetic pta she said. Plan is home. PT and OT to endy. Pt's daughter to pick her up. Pt is observation status. Neurology to see. .CIRO Blackwood  5/15/2024     Case Management Assessment  Initial Evaluation     Date/Time of Evaluation: 5/15/2024 10:44 AM  Assessment Completed by: CIRO Blackwood     If patient is discharged prior to next notation, then this note serves as note for discharge by case management.     Patient Name: Keiry Suarez                   YOB: 1954  Diagnosis: Acute cystitis without hematuria [N30.00]  Frequent falls [R29.6]  Acute alcoholic intoxication without complication (HCC) [F10.920]                   Date / Time: 5/13/2024  7:29 PM     Patient Admission Status: Inpatient   Readmission Risk (Low < 19, Mod (19-27), High > 27): Readmission Risk Score: 8.8     Current PCP: No primary care provider on file.  PCP verified by ? Yes     Chart Reviewed: Yes      History Provided by: Patient  Patient Orientation: Alert and Oriented    Patient Cognition: Alert     Hospitalization in the last 30 days (Readmission):  No    If yes, Readmission Assessment in  Navigator will be completed.     Advance Directives:       Code Status: Full Code   Patient's Primary Decision Maker is: Legal Next of Kin        Discharge Planning:     Patient lives with: Alone Type of Home:

## 2024-05-15 NOTE — PROGRESS NOTES
4 Eyes Skin Assessment     NAME:  Andreina Gregg  YOB: 1927  MEDICAL RECORD NUMBER:  [id number]    The patient is being assessed for  Admission    I agree that at least one RN has performed a thorough Head to Toe Skin Assessment on the patient. ALL assessment sites listed below have been assessed.      Areas assessed by both nurses:    Head, Face, Ears, Shoulders, Back, Chest, Arms, Elbows, Hands, Sacrum. Buttock, Coccyx, Ischium, Legs. Feet and Heels, and Under Medical Devices       Nose abrasion  Does the Patient have a Wound? No noted wound(s)       Freeman Prevention initiated by RN: No  Wound Care Orders initiated by RN: No    Pressure Injury (Stage 3,4, Unstageable, DTI, NWPT, and Complex wounds) if present, place Wound referral order by RN under : No    New Ostomies, if present place, Ostomy referral order under : No     Nurse 1 eSignature: Electronically signed by Farzaneh Huynh RN on 5/15/24 at 2:11 AM EDT    **SHARE this note so that the co-signing nurse can place an eSignature**    Nurse 2 eSignature: Electronically signed by Jacey Cancino RN on 5/15/24 at 2:12 AM EDT   
Discharge instructions explained in detail to pt and daughter at bedside including medications, follow ups,etc. Pt and daughter both expressed an understanding   
Message sent to Dr Lamas regarding MRI B showing  Stable tiny punctate area of old hemorrhage in the left high mid parietal centrum semiovale consistent with an old hemorrhage, and if asa and lovenox ok to give  Okay per Dr Lamas  
Message sent to Dr. Hernandez's answering service regarding patient's code status. DNR paperwork brought in by daughter placed in soft chart.    Per Dr. Hernandez via telephone with readback, patient's code status change to DNR-CCA per paperwork from her daughter, Olga.  
Paged Dr. Hernandez to see if patient able to discharge.   
Per dr Lamas pt ok to dc from their stand point  
Physical Therapy  Initial Assessment     Name: Andreina Gregg  : 1927  MRN: [id number]      Date of Service: 5/15/2024    Evaluating PT: Maikel Varela, PT, DPT [id number]      Room #:  8503/8503-A  Diagnosis:  Acute cerebrovascular accident (HCC) [I63.9]  Cerebrovascular accident (CVA), unspecified mechanism (HCC) [I63.9]  PMHx/PSHx:   has a past medical history of Acid reflux disease, Arthritis, Chronic back pain, COPD (chronic obstructive pulmonary disease) (HCC), Hearing loss, Hyperlipidemia, Irritable bowel, Meningioma (HCC), Migraine, Neuropathy, Osteoarthritis, Osteoporosis, Thyroid disease, and TIA (transient ischemic attack).  Precautions:  Fall risk, [place]    SUBJECTIVE:    Pt lives alone in a single story condo with 1 stair(s) and no rail(s) to enter. Pt ambulated with WW prior to admission. Daughter lives across the nash.    OBJECTIVE:   Initial Evaluation  Date: 5/15/24 Treatment Date: Short Term/ Long Term   Goals   AM-PAC 6 Clicks      Was pt agreeable to Eval/treatment? Yes     Does pt have pain? No complaints of pain     Bed Mobility  Rolling: NT  Supine to sit: SBA  Sit to supine: SBA  Scooting: SBA to EOB  Rolling: Independent   Supine to sit: Independent   Sit to supine: Independent   Scooting: Independent    Transfers Sit to stand: SBA  Stand to sit: SBA  Stand pivot: SBA with WW  Sit to stand: Independent   Stand to sit: Independent   Stand pivot: Mod Independent with WW   Ambulation   200 feet with WW with SBA  >800 feet with WW Mod Independent    Stair negotiation: ascended and descended NT  1 platform step with WW Mod Independent    ROM BUE: Refer to OT note  BLE: WFL     Strength BUE: Refer to OT note  BLE: 4/5 grossly     Balance Sitting EOB: Supervision  Dynamic Standing: SBA with WW  Sitting EOB: Independent   Dynamic Standing: Mod Independent with WW     Pt is A & O x: 4 to person, place, month/year, and situation.   Sensation: Pt denies numbness and tingling of extremities. 
have resolved       Recommended Adaptive Equipment: TBD      Home Living:  Pt lives alone  in a 1 story condo  with 1 step to enter  and no hand rails    Bedroom setup: main level    Bathroom setup: main level  tub/shower combination   Equipment owned: front wheeled walker , four wheeled walker , and wc  shower chair      Prior Level of Function:  Pt I with  ADLs , PRN A with IADLs, and completed functional mobility with front wheeled walker   Falls: no   Driving: no   Occupation/leisure: did not state     Pleasant and cooperative throughout session       Pain Level: no s/s of pain  Location: n/a  OT provided: n/a    Cognition: A&O: 4/4;    Follows single step directions: Good   Memory: Good   Sequencing: Good   Problem solving: Good   Judgement/safety: Fair +   Attention:  Good     Functional Assessment: AM-PAC Inpatient Daily Activity Raw Score: 19 /24     Initial Eval Status  Date: 5/15/2024   Treatment Status  Date: STG=LTG  Time frame: 10-14 days   Feeding Independent       Grooming Set up    Mod I    UB Dressing Set up  To art long sleeve shirt seated EOB   Mod I    LB Dressing Stand by assist   To art salvatoreans    Mod I    Bathing Stand by assist   Simulated   Mod I    Toileting Stand by assist   Mod I    Bed Mobility  Rolling L/R: NT   Supine to sit: Stand by assist    Sit to supine: NT   Supine to sit: Mod I   Sit to supine: Mod I    Functional Transfers Sit to stand: Stand by assist    Stand to sit: Stand by assist    Stand pivot: Stand by assist    Mod I    Functional Mobility Stand by assist  With front wheeled walker  in the room  in order to increase activity tolerance and strength for increased independence in ADLs and IADLs   Mod I   with AAD    Balance Sitting: Independent      Standing: Stand by assist        Standing: Mod I    Activity Tolerance Good       Visual/  Perceptual wears glasses  Hx of double vision 2/2 CVA   Macular degeneration             BUE  ROM/Strength/  Fine motor Coordination

## 2024-05-15 NOTE — H&P
[facility]              [address] [place] [address]                           HISTORY & PHYSICAL      PATIENT NAME: MIRYAM SMITH                    : 1927  MED REC NO: [id number]                        ROOM: 8503  ACCOUNT NO: [id number]                       ADMIT DATE: 2024  PROVIDER: Milind Hernandez DO      PRIMARY CARE PHYSICIAN:  Milind Cobb MD    CHIEF COMPLAINT:  Nausea, dizziness.    HISTORY OF PRESENT ILLNESS:  The patient is a 89-year-old  female who presented to the emergency room complaining of nausea, dizziness, left facial numbness.  She checked her blood pressure, was elevated.  She presented to the emergency room.  Her blood pressure was elevated.  She was given medication through the IV in the emergency room and admitted for further evaluation and treatment.    MEDICATIONS:  Prior to admission:  Tylenol, albuterol inhaler, aspirin, Pulmicort, vitamin D, Colace, Apresoline, Synthroid, Imitrex p.r.n.    PAST MEDICAL HISTORY:  Hypothyroidism, osteoarthritis, COPD, irritable bowel syndrome, hypertension, meningioma, patent foramen ovale.    PAST SURGICAL HISTORY:  Tonsillectomy, appendectomy, cholecystectomy, thyroid surgery, wisdom teeth extraction, bilateral eye cataract surgery, bilateral upper eyelid surgery, hysterectomy, hemorrhoidectomy, bilateral feet bunion surgery, left ankle surgery.    REVIEW OF SYSTEMS:  Remarkable for above-stated chief complaint.    ALLERGIES:  TRAMADOL, AMLODIPINE, BENTYL, CODEINE, LACTOSE INTOLERANCE, MOBIC, MONOSODIUM GLUTAMATE, ARTIFICIAL SWEETENERS, PANTOPRAZOLE, PREDNISONE, POLLEN.      SOCIAL HISTORY:  No tobacco, no alcohol.    PHYSICAL EXAMINATION:  GENERAL APPEARANCE:  Reveals a 89-year-old  female, who is alert and oriented x3, cooperative, and a good historian.  VITAL SIGNS:  On admission, temperature 98.2, pulse 84, respirations 18, blood pressure

## 2024-05-15 NOTE — ACP (ADVANCE CARE PLANNING)
Advance Care Planning   Healthcare Decision Maker:    Primary Decision Maker: Jolene Lopesn [facility] - [phone]    Click here to complete Healthcare Decision Makers including selection of the Healthcare Decision Maker Relationship (ie \"Primary\").

## 2024-05-15 NOTE — CONSULTS
[facility]  Neurology Consult    Date:  5/15/2024  Patient Name:  Andreina Gregg  YOB: 1927  MRN: [id number]     PCP:  Milind Cobb MD   Referring:  No ref. provider found      Chief Complaint: Strokelike symptoms    History obtained from: Patient and EMR    Assessment    Post-stroke recrudescence in the setting of hypertensive urgency  Patient symptoms were exactly simialr to her previous stroke symptoms   Patient blood pressure was elevated to 200/90 on admission  No evidence of new stroke on MRI.      Left cerebellar meningioma      Plan  Recommend good blood pressure control.  Risk factor modification.  Continue aspirin.  Follow-up in few months for outpatient neurology.        History of Present Illness:  Andreina Gregg is a 89 y.o. right handed female presenting for evaluation of left sided weakness, and left facial numbness.  Patient has a past medical history of acute ischemic stroke s/p tPA a year ago.  Since then the patient has been on aspirin monotherapy.  She is intolerant to statin.  She mentions that the symptoms were similar to the symptoms she had during her stroke.  And she was unable to describe the symptoms at this time it was neither numbness nor heaviness or normal weakness in her left arm.  In the ED patient was found to have elevated blood pressure with systolic blood pressure greater than 200.    Patient was seen in the morning, she was alert and oriented following commands.  She mentioned that her symptoms have improved.            Medical History:   Past Medical History:   Diagnosis Date    Acid reflux disease     Arthritis     Chronic back pain     COPD (chronic obstructive pulmonary disease) (HCC)     Hearing loss     Hyperlipidemia     Irritable bowel     Meningioma (HCC) 05/2018    Migraine     Neuropathy     Osteoarthritis     Osteoporosis     Thyroid disease     hypo    TIA (transient ischemic attack) 05/2018        Surgical History:   Past

## 2024-12-06 ENCOUNTER — APPOINTMENT (OUTPATIENT)
Dept: CT IMAGING | Age: 88
DRG: 543 | End: 2024-12-06
Payer: MEDICARE

## 2024-12-06 ENCOUNTER — HOSPITAL ENCOUNTER (INPATIENT)
Age: 88
LOS: 3 days | Discharge: HOME HEALTH | DRG: 543 | End: 2024-12-09
Attending: STUDENT IN AN ORGANIZED HEALTH CARE EDUCATION/TRAINING PROGRAM | Admitting: STUDENT IN AN ORGANIZED HEALTH CARE EDUCATION/TRAINING PROGRAM
Payer: MEDICARE

## 2024-12-06 DIAGNOSIS — M48.54XA NONTRAUMATIC COMPRESSION FRACTURE OF T10 VERTEBRA, INITIAL ENCOUNTER (HCC): Primary | ICD-10-CM

## 2024-12-06 DIAGNOSIS — M54.9 INTRACTABLE BACK PAIN: ICD-10-CM

## 2024-12-06 LAB
ANION GAP SERPL CALCULATED.3IONS-SCNC: 13 MMOL/L (ref 7–16)
BASOPHILS # BLD: 0.05 K/UL (ref 0–0.2)
BASOPHILS NFR BLD: 1 % (ref 0–2)
BUN SERPL-MCNC: 11 MG/DL (ref 6–23)
CALCIUM SERPL-MCNC: 10.2 MG/DL (ref 8.6–10.2)
CHLORIDE SERPL-SCNC: 98 MMOL/L (ref 98–107)
CO2 SERPL-SCNC: 25 MMOL/L (ref 22–29)
CREAT SERPL-MCNC: 0.7 MG/DL (ref 0.5–1)
EOSINOPHIL # BLD: 0.05 K/UL (ref 0.05–0.5)
EOSINOPHILS RELATIVE PERCENT: 1 % (ref 0–6)
ERYTHROCYTE [DISTWIDTH] IN BLOOD BY AUTOMATED COUNT: 13 % (ref 11.5–15)
GFR, ESTIMATED: 80 ML/MIN/1.73M2
GLUCOSE SERPL-MCNC: 98 MG/DL (ref 74–99)
HCT VFR BLD AUTO: 40 % (ref 34–48)
HGB BLD-MCNC: 12.8 G/DL (ref 11.5–15.5)
IMM GRANULOCYTES # BLD AUTO: <0.03 K/UL (ref 0–0.58)
IMM GRANULOCYTES NFR BLD: 0 % (ref 0–5)
LYMPHOCYTES NFR BLD: 1.01 K/UL (ref 1.5–4)
LYMPHOCYTES RELATIVE PERCENT: 18 % (ref 20–42)
MCH RBC QN AUTO: 30.3 PG (ref 26–35)
MCHC RBC AUTO-ENTMCNC: 32 G/DL (ref 32–34.5)
MCV RBC AUTO: 94.8 FL (ref 80–99.9)
MONOCYTES NFR BLD: 0.26 K/UL (ref 0.1–0.95)
MONOCYTES NFR BLD: 5 % (ref 2–12)
NEUTROPHILS NFR BLD: 76 % (ref 43–80)
NEUTS SEG NFR BLD: 4.35 K/UL (ref 1.8–7.3)
PLATELET # BLD AUTO: 238 K/UL (ref 130–450)
PMV BLD AUTO: 9.4 FL (ref 7–12)
POTASSIUM SERPL-SCNC: 4.5 MMOL/L (ref 3.5–5)
RBC # BLD AUTO: 4.22 M/UL (ref 3.5–5.5)
SODIUM SERPL-SCNC: 136 MMOL/L (ref 132–146)
WBC OTHER # BLD: 5.7 K/UL (ref 4.5–11.5)

## 2024-12-06 PROCEDURE — 99285 EMERGENCY DEPT VISIT HI MDM: CPT

## 2024-12-06 PROCEDURE — 85025 COMPLETE CBC W/AUTO DIFF WBC: CPT

## 2024-12-06 PROCEDURE — 72131 CT LUMBAR SPINE W/O DYE: CPT

## 2024-12-06 PROCEDURE — 1200000000 HC SEMI PRIVATE

## 2024-12-06 PROCEDURE — 94640 AIRWAY INHALATION TREATMENT: CPT

## 2024-12-06 PROCEDURE — 99222 1ST HOSP IP/OBS MODERATE 55: CPT | Performed by: STUDENT IN AN ORGANIZED HEALTH CARE EDUCATION/TRAINING PROGRAM

## 2024-12-06 PROCEDURE — 6360000002 HC RX W HCPCS: Performed by: STUDENT IN AN ORGANIZED HEALTH CARE EDUCATION/TRAINING PROGRAM

## 2024-12-06 PROCEDURE — 6370000000 HC RX 637 (ALT 250 FOR IP): Performed by: STUDENT IN AN ORGANIZED HEALTH CARE EDUCATION/TRAINING PROGRAM

## 2024-12-06 PROCEDURE — 72128 CT CHEST SPINE W/O DYE: CPT

## 2024-12-06 PROCEDURE — 80048 BASIC METABOLIC PNL TOTAL CA: CPT

## 2024-12-06 PROCEDURE — 2580000003 HC RX 258: Performed by: STUDENT IN AN ORGANIZED HEALTH CARE EDUCATION/TRAINING PROGRAM

## 2024-12-06 RX ORDER — HYDROCODONE BITARTRATE AND ACETAMINOPHEN 5; 325 MG/1; MG/1
0.5 TABLET ORAL ONCE
Status: COMPLETED | OUTPATIENT
Start: 2024-12-06 | End: 2024-12-06

## 2024-12-06 RX ORDER — ENOXAPARIN SODIUM 100 MG/ML
30 INJECTION SUBCUTANEOUS DAILY
Status: DISCONTINUED | OUTPATIENT
Start: 2024-12-06 | End: 2024-12-09 | Stop reason: HOSPADM

## 2024-12-06 RX ORDER — ACETAMINOPHEN 325 MG/1
650 TABLET ORAL EVERY 6 HOURS PRN
Status: DISCONTINUED | OUTPATIENT
Start: 2024-12-06 | End: 2024-12-09 | Stop reason: HOSPADM

## 2024-12-06 RX ORDER — SODIUM CHLORIDE 0.9 % (FLUSH) 0.9 %
5-40 SYRINGE (ML) INJECTION EVERY 12 HOURS SCHEDULED
Status: DISCONTINUED | OUTPATIENT
Start: 2024-12-06 | End: 2024-12-09 | Stop reason: HOSPADM

## 2024-12-06 RX ORDER — SUMATRIPTAN 50 MG/1
100 TABLET, FILM COATED ORAL
Status: ACTIVE | OUTPATIENT
Start: 2024-12-06 | End: 2024-12-07

## 2024-12-06 RX ORDER — SODIUM CHLORIDE 0.9 % (FLUSH) 0.9 %
5-40 SYRINGE (ML) INJECTION PRN
Status: DISCONTINUED | OUTPATIENT
Start: 2024-12-06 | End: 2024-12-09 | Stop reason: HOSPADM

## 2024-12-06 RX ORDER — ONDANSETRON 4 MG/1
4 TABLET, ORALLY DISINTEGRATING ORAL EVERY 8 HOURS PRN
Status: DISCONTINUED | OUTPATIENT
Start: 2024-12-06 | End: 2024-12-09 | Stop reason: HOSPADM

## 2024-12-06 RX ORDER — OXYCODONE HYDROCHLORIDE 5 MG/1
10 TABLET ORAL EVERY 6 HOURS PRN
Status: DISCONTINUED | OUTPATIENT
Start: 2024-12-06 | End: 2024-12-07

## 2024-12-06 RX ORDER — ACETAMINOPHEN 500 MG
1000 TABLET ORAL EVERY 8 HOURS PRN
Status: DISCONTINUED | OUTPATIENT
Start: 2024-12-06 | End: 2024-12-06 | Stop reason: SDUPTHER

## 2024-12-06 RX ORDER — CHOLECALCIFEROL (VITAMIN D3) 50 MCG
2000 TABLET ORAL EVERY MORNING
Status: DISCONTINUED | OUTPATIENT
Start: 2024-12-07 | End: 2024-12-09 | Stop reason: HOSPADM

## 2024-12-06 RX ORDER — FENTANYL CITRATE 50 UG/ML
25 INJECTION, SOLUTION INTRAMUSCULAR; INTRAVENOUS ONCE
Status: DISCONTINUED | OUTPATIENT
Start: 2024-12-06 | End: 2024-12-06

## 2024-12-06 RX ORDER — ACETAMINOPHEN 650 MG/1
650 SUPPOSITORY RECTAL EVERY 6 HOURS PRN
Status: DISCONTINUED | OUTPATIENT
Start: 2024-12-06 | End: 2024-12-09 | Stop reason: HOSPADM

## 2024-12-06 RX ORDER — ONDANSETRON 2 MG/ML
4 INJECTION INTRAMUSCULAR; INTRAVENOUS EVERY 6 HOURS PRN
Status: DISCONTINUED | OUTPATIENT
Start: 2024-12-06 | End: 2024-12-09 | Stop reason: HOSPADM

## 2024-12-06 RX ORDER — ASPIRIN 81 MG/1
81 TABLET ORAL
Status: DISCONTINUED | OUTPATIENT
Start: 2024-12-07 | End: 2024-12-09 | Stop reason: HOSPADM

## 2024-12-06 RX ORDER — BUDESONIDE 0.5 MG/2ML
0.5 INHALANT ORAL
Status: DISCONTINUED | OUTPATIENT
Start: 2024-12-06 | End: 2024-12-09 | Stop reason: HOSPADM

## 2024-12-06 RX ORDER — POTASSIUM CHLORIDE 1500 MG/1
40 TABLET, EXTENDED RELEASE ORAL PRN
Status: DISCONTINUED | OUTPATIENT
Start: 2024-12-06 | End: 2024-12-09 | Stop reason: HOSPADM

## 2024-12-06 RX ORDER — LEVOTHYROXINE SODIUM 50 UG/1
50 TABLET ORAL DAILY
Status: DISCONTINUED | OUTPATIENT
Start: 2024-12-07 | End: 2024-12-09 | Stop reason: HOSPADM

## 2024-12-06 RX ORDER — MAGNESIUM SULFATE IN WATER 40 MG/ML
2000 INJECTION, SOLUTION INTRAVENOUS PRN
Status: DISCONTINUED | OUTPATIENT
Start: 2024-12-06 | End: 2024-12-09 | Stop reason: HOSPADM

## 2024-12-06 RX ORDER — POTASSIUM CHLORIDE 7.45 MG/ML
10 INJECTION INTRAVENOUS PRN
Status: DISCONTINUED | OUTPATIENT
Start: 2024-12-06 | End: 2024-12-06 | Stop reason: CLARIF

## 2024-12-06 RX ORDER — SODIUM CHLORIDE 9 MG/ML
INJECTION, SOLUTION INTRAVENOUS PRN
Status: DISCONTINUED | OUTPATIENT
Start: 2024-12-06 | End: 2024-12-09 | Stop reason: HOSPADM

## 2024-12-06 RX ADMIN — OXYCODONE 10 MG: 5 TABLET ORAL at 19:39

## 2024-12-06 RX ADMIN — SODIUM CHLORIDE, PRESERVATIVE FREE 10 ML: 5 INJECTION INTRAVENOUS at 19:40

## 2024-12-06 RX ADMIN — BUDESONIDE 500 MCG: 0.5 SUSPENSION RESPIRATORY (INHALATION) at 19:52

## 2024-12-06 RX ADMIN — ENOXAPARIN SODIUM 30 MG: 100 INJECTION SUBCUTANEOUS at 19:39

## 2024-12-06 RX ADMIN — HYDROCODONE BITARTRATE AND ACETAMINOPHEN 0.5 TABLET: 5; 325 TABLET ORAL at 11:26

## 2024-12-06 ASSESSMENT — PAIN DESCRIPTION - DESCRIPTORS: DESCRIPTORS: SHARP

## 2024-12-06 ASSESSMENT — PAIN SCALES - GENERAL
PAINLEVEL_OUTOF10: 8
PAINLEVEL_OUTOF10: 10
PAINLEVEL_OUTOF10: 8
PAINLEVEL_OUTOF10: 10

## 2024-12-06 ASSESSMENT — PAIN DESCRIPTION - LOCATION
LOCATION: BACK

## 2024-12-06 ASSESSMENT — PAIN DESCRIPTION - ORIENTATION: ORIENTATION: LEFT;MID

## 2024-12-06 ASSESSMENT — PAIN - FUNCTIONAL ASSESSMENT: PAIN_FUNCTIONAL_ASSESSMENT: 0-10

## 2024-12-06 NOTE — ED PROVIDER NOTES
(N/A, 8/1/2018); and blepharoplasty.    Social History:  reports that she quit smoking about 28 years ago. Her smoking use included cigarettes. She started smoking about 65 years ago. She has a 36.4 pack-year smoking history. She has never used smokeless tobacco. She reports that she does not drink alcohol and does not use drugs.    Family History: family history includes Cancer in her brother, brother, and mother; Emphysema in her father; Heart Disease in her brother; Rheum Arthritis in her sister.     The patient’s home medications have been reviewed.    Allergies: Ultram [tramadol], Codeine, Deltasone [prednisone], Aspirin, Bentyl [dicyclomine hcl], Lactose intolerance (gi), Miralax [polyethylene glycol], Mobic [meloxicam], Monosodium glutamate, Neurontin [gabapentin], Norvasc [amlodipine], Plavix [clopidogrel], Protonix [pantoprazole], and Talwin [pentazocine]    -------------------------------------------------- RESULTS -------------------------------------------------    LABS:  Results for orders placed or performed during the hospital encounter of 12/06/24   CBC with Auto Differential   Result Value Ref Range    WBC 5.7 4.5 - 11.5 k/uL    RBC 4.22 3.50 - 5.50 m/uL    Hemoglobin 12.8 11.5 - 15.5 g/dL    Hematocrit 40.0 34.0 - 48.0 %    MCV 94.8 80.0 - 99.9 fL    MCH 30.3 26.0 - 35.0 pg    MCHC 32.0 32.0 - 34.5 g/dL    RDW 13.0 11.5 - 15.0 %    Platelets 238 130 - 450 k/uL    MPV 9.4 7.0 - 12.0 fL    Neutrophils % 76 43.0 - 80.0 %    Lymphocytes % 18 (L) 20.0 - 42.0 %    Monocytes % 5 2.0 - 12.0 %    Eosinophils % 1 0 - 6 %    Basophils % 1 0.0 - 2.0 %    Immature Granulocytes % 0 0.0 - 5.0 %    Neutrophils Absolute 4.35 1.80 - 7.30 k/uL    Lymphocytes Absolute 1.01 (L) 1.50 - 4.00 k/uL    Monocytes Absolute 0.26 0.10 - 0.95 k/uL    Eosinophils Absolute 0.05 0.05 - 0.50 k/uL    Basophils Absolute 0.05 0.00 - 0.20 k/uL    Immature Granulocytes Absolute <0.03 0.00 - 0.58 k/uL   BMP   Result Value Ref Range

## 2024-12-06 NOTE — ED TRIAGE NOTES
Department of Emergency Medicine  FIRST PROVIDER TRIAGE NOTE             Independent MLP           12/6/24  10:33 AM EST    Date of Encounter: 12/6/24   MRN: [id number]      HPI: Andreina Gregg is a 89 y.o. female who presents to the ED for No chief complaint on file.  89-year-old female who presents today for back pain and sent by her PCP.  Has a history of back pain and PCP has concern for possible compression fracture.  She denies any numbness or tingling.  Denies any urinary or bowel incontinence.    ROS: Negative for cp or sob.    PE: Gen Appearance/Constitutional: alert  CV: regular rate     Initial Plan of Care: All treatment areas with department are currently occupied. Plan to order/Initiate the following while awaiting opening in ED: imaging studies.  Initiate Treatment-Testing, Proceed toTreatment Area When Bed Available for ED Attending/MLP to Continue Care    Electronically signed by NISHANT Carey CNP   DD: 12/6/24

## 2024-12-06 NOTE — ED NOTES
..ED to Inpatient Handoff Report    Notified Keira that electronic handoff available and patient ready for transport to room 503.    Safety Risks: Risk of falls    Patient in Restraints: no    Constant Observer or Patient : no    Telemetry Monitoring Ordered :No           Order to transfer to unit without monitor:N/A    Last MEWS: 1 Time completed: 1715    Deterioration Index Score:   Predictive Model Details          22 (Normal)  Factor Value    Calculated 12/6/2024 17:16 74% Age 89 years old    Deterioration Index Model 14% Potassium 4.5 mmol/L     8% Systolic 140     3% Sodium 136 mmol/L     0% Temperature 97.6 °F (36.4 °C)     0% Pulse 78     0% WBC count 5.7 k/uL     0% Hematocrit 40.0 %     0% Pulse oximetry 96 %     0% Respiratory rate 16        Vitals:    12/06/24 1036 12/06/24 1715   BP: (!) 176/80 (!) 140/68   Pulse: 83 78   Resp: 16 16   Temp: 97.5 °F (36.4 °C) 97.6 °F (36.4 °C)   TempSrc: Temporal Oral   SpO2: 99% 96%   Weight: 42.6 kg (94 lb)    Height: 1.524 m (5')          Opportunity for questions and clarification was provided.

## 2024-12-06 NOTE — H&P
[facility] Hospitalist Group   History and Physical      CHIEF COMPLAINT:  back pain    History of Present Illness:  89 y.o. female with a history of macular degeneration, stroke in 2022 s/p TPA, TIA in 2023, PFO 2022, meningioma 2018, COPD, hypothyroidism presents with back pain since Tuesday. Patient reports no trauma or fall. Patient has had 2 kyphoplasties r/t compression fractures. CT lumbar spine showed multilevel DDD, spinal stenosis at L3-4, stable 13mm peripherally calcified splenic artery aneurysm, diverticulosis, simple left hepatic cyst and right renal cyst. CT thoracic spine showed acute or subacute 40% compression fracture at T6 and multiple stable compression fractures. Labs unremarkable. ED course included 0.5tab norco.     Informant(s) for H&P: Patient and EMR     REVIEW OF SYSTEMS:  no fevers, chills, cp, sob, n/v, ha, vision/hearing changes, wt changes, hot/cold flashes, other open skin lesions, diarrhea, constipation, dysuria/hematuria unless noted in HPI. Complete ROS performed with the patient and is otherwise negative.      PMH:  Past Medical History:   Diagnosis Date    Acid reflux disease     Arthritis     Chronic back pain     COPD (chronic obstructive pulmonary disease) (HCC)     Hearing loss     Hyperlipidemia     Irritable bowel     Meningioma (HCC) 05/2018    Migraine     Neuropathy     Osteoarthritis     Osteoporosis     Thyroid disease     hypo    TIA (transient ischemic attack) 05/2018       Surgical History:  Past Surgical History:   Procedure Laterality Date    ANKLE SURGERY  1980    pin placement    APPENDECTOMY      BLEPHAROPLASTY      BREAST BIOPSY  1978    BREAST SURGERY      biopsy bilat     CHOLECYSTECTOMY      COLONOSCOPY      ENDOSCOPY, COLON, DIAGNOSTIC  8/2012    ERCP  10/31/2012    ERCP  12/05/2012    stent removal    EYE SURGERY      cataract    FIXATION KYPHOPLASTY  08/03/2016    LUMBAR 2    FRACTURE SURGERY      left ankle, pin placement

## 2024-12-06 NOTE — PROGRESS NOTES
Database initiated. Patient is A&O comes in from home alone (daughter is her neighbor). States she uses a walker and is RA at baseline.

## 2024-12-07 ENCOUNTER — APPOINTMENT (OUTPATIENT)
Dept: MRI IMAGING | Age: 88
DRG: 543 | End: 2024-12-07
Payer: MEDICARE

## 2024-12-07 LAB
ANION GAP SERPL CALCULATED.3IONS-SCNC: 11 MMOL/L (ref 7–16)
BASOPHILS # BLD: 0.06 K/UL (ref 0–0.2)
BASOPHILS NFR BLD: 1 % (ref 0–2)
BUN SERPL-MCNC: 10 MG/DL (ref 6–23)
CALCIUM SERPL-MCNC: 9.6 MG/DL (ref 8.6–10.2)
CHLORIDE SERPL-SCNC: 102 MMOL/L (ref 98–107)
CO2 SERPL-SCNC: 21 MMOL/L (ref 22–29)
CREAT SERPL-MCNC: 0.6 MG/DL (ref 0.5–1)
EOSINOPHIL # BLD: 0.13 K/UL (ref 0.05–0.5)
EOSINOPHILS RELATIVE PERCENT: 3 % (ref 0–6)
ERYTHROCYTE [DISTWIDTH] IN BLOOD BY AUTOMATED COUNT: 13.1 % (ref 11.5–15)
GFR, ESTIMATED: 84 ML/MIN/1.73M2
GLUCOSE SERPL-MCNC: 93 MG/DL (ref 74–99)
HCT VFR BLD AUTO: 39 % (ref 34–48)
HGB BLD-MCNC: 12.3 G/DL (ref 11.5–15.5)
IMM GRANULOCYTES # BLD AUTO: <0.03 K/UL (ref 0–0.58)
IMM GRANULOCYTES NFR BLD: 0 % (ref 0–5)
LYMPHOCYTES NFR BLD: 1.61 K/UL (ref 1.5–4)
LYMPHOCYTES RELATIVE PERCENT: 35 % (ref 20–42)
MCH RBC QN AUTO: 30.1 PG (ref 26–35)
MCHC RBC AUTO-ENTMCNC: 31.5 G/DL (ref 32–34.5)
MCV RBC AUTO: 95.6 FL (ref 80–99.9)
MONOCYTES NFR BLD: 0.33 K/UL (ref 0.1–0.95)
MONOCYTES NFR BLD: 7 % (ref 2–12)
NEUTROPHILS NFR BLD: 53 % (ref 43–80)
NEUTS SEG NFR BLD: 2.44 K/UL (ref 1.8–7.3)
PHOSPHATE SERPL-MCNC: 3.7 MG/DL (ref 2.5–4.5)
PLATELET, FLUORESCENCE: 232 K/UL (ref 130–450)
PMV BLD AUTO: 9.4 FL (ref 7–12)
POTASSIUM SERPL-SCNC: 4.5 MMOL/L (ref 3.5–5)
RBC # BLD AUTO: 4.08 M/UL (ref 3.5–5.5)
SODIUM SERPL-SCNC: 134 MMOL/L (ref 132–146)
WBC OTHER # BLD: 4.6 K/UL (ref 4.5–11.5)

## 2024-12-07 PROCEDURE — 6370000000 HC RX 637 (ALT 250 FOR IP): Performed by: STUDENT IN AN ORGANIZED HEALTH CARE EDUCATION/TRAINING PROGRAM

## 2024-12-07 PROCEDURE — 2580000003 HC RX 258: Performed by: STUDENT IN AN ORGANIZED HEALTH CARE EDUCATION/TRAINING PROGRAM

## 2024-12-07 PROCEDURE — 6360000002 HC RX W HCPCS: Performed by: STUDENT IN AN ORGANIZED HEALTH CARE EDUCATION/TRAINING PROGRAM

## 2024-12-07 PROCEDURE — 94640 AIRWAY INHALATION TREATMENT: CPT

## 2024-12-07 PROCEDURE — 84100 ASSAY OF PHOSPHORUS: CPT

## 2024-12-07 PROCEDURE — 85025 COMPLETE CBC W/AUTO DIFF WBC: CPT

## 2024-12-07 PROCEDURE — 72146 MRI CHEST SPINE W/O DYE: CPT

## 2024-12-07 PROCEDURE — 1200000000 HC SEMI PRIVATE

## 2024-12-07 PROCEDURE — 80048 BASIC METABOLIC PNL TOTAL CA: CPT

## 2024-12-07 PROCEDURE — 99233 SBSQ HOSP IP/OBS HIGH 50: CPT | Performed by: STUDENT IN AN ORGANIZED HEALTH CARE EDUCATION/TRAINING PROGRAM

## 2024-12-07 RX ORDER — HYDROXYZINE HYDROCHLORIDE 10 MG/1
25 TABLET, FILM COATED ORAL ONCE
Status: COMPLETED | OUTPATIENT
Start: 2024-12-07 | End: 2024-12-07

## 2024-12-07 RX ORDER — POLYETHYLENE GLYCOL 3350 17 G/17G
17 POWDER, FOR SOLUTION ORAL
COMMUNITY

## 2024-12-07 RX ORDER — VALACYCLOVIR HYDROCHLORIDE 500 MG/1
500 TABLET, FILM COATED ORAL 2 TIMES DAILY PRN
COMMUNITY

## 2024-12-07 RX ORDER — FENTANYL CITRATE 50 UG/ML
25 INJECTION, SOLUTION INTRAMUSCULAR; INTRAVENOUS
Status: DISCONTINUED | OUTPATIENT
Start: 2024-12-07 | End: 2024-12-09 | Stop reason: HOSPADM

## 2024-12-07 RX ORDER — LIDOCAINE 4 G/G
1 PATCH TOPICAL DAILY PRN
COMMUNITY

## 2024-12-07 RX ORDER — ONDANSETRON 4 MG/1
4 TABLET, ORALLY DISINTEGRATING ORAL EVERY 8 HOURS PRN
COMMUNITY

## 2024-12-07 RX ORDER — ALBUTEROL SULFATE 90 UG/1
2 INHALANT RESPIRATORY (INHALATION) EVERY 6 HOURS PRN
COMMUNITY

## 2024-12-07 RX ORDER — DEXLANSOPRAZOLE 60 MG/1
60 CAPSULE, DELAYED RELEASE ORAL DAILY PRN
COMMUNITY

## 2024-12-07 RX ORDER — DOCUSATE SODIUM 100 MG/1
100 CAPSULE, LIQUID FILLED ORAL 2 TIMES DAILY
Status: DISCONTINUED | OUTPATIENT
Start: 2024-12-07 | End: 2024-12-09 | Stop reason: HOSPADM

## 2024-12-07 RX ORDER — ACETAMINOPHEN 500 MG
1000 TABLET ORAL EVERY 8 HOURS SCHEDULED
Status: DISCONTINUED | OUTPATIENT
Start: 2024-12-07 | End: 2024-12-09 | Stop reason: HOSPADM

## 2024-12-07 RX ORDER — HYDRALAZINE HYDROCHLORIDE 10 MG/1
10 TABLET, FILM COATED ORAL 2 TIMES DAILY
Status: DISCONTINUED | OUTPATIENT
Start: 2024-12-07 | End: 2024-12-09 | Stop reason: HOSPADM

## 2024-12-07 RX ORDER — OXYCODONE HYDROCHLORIDE 5 MG/1
5 TABLET ORAL EVERY 4 HOURS PRN
Status: DISCONTINUED | OUTPATIENT
Start: 2024-12-07 | End: 2024-12-09 | Stop reason: HOSPADM

## 2024-12-07 RX ADMIN — HYDRALAZINE HYDROCHLORIDE 10 MG: 10 TABLET ORAL at 19:53

## 2024-12-07 RX ADMIN — BUDESONIDE 500 MCG: 0.5 SUSPENSION RESPIRATORY (INHALATION) at 18:05

## 2024-12-07 RX ADMIN — DOCUSATE SODIUM 100 MG: 100 CAPSULE, LIQUID FILLED ORAL at 19:53

## 2024-12-07 RX ADMIN — LEVOTHYROXINE SODIUM 50 MCG: 0.05 TABLET ORAL at 05:29

## 2024-12-07 RX ADMIN — OXYCODONE 10 MG: 5 TABLET ORAL at 08:25

## 2024-12-07 RX ADMIN — ACETAMINOPHEN 1000 MG: 500 TABLET ORAL at 16:46

## 2024-12-07 RX ADMIN — HYDRALAZINE HYDROCHLORIDE 10 MG: 10 TABLET ORAL at 10:52

## 2024-12-07 RX ADMIN — HYDROXYZINE HYDROCHLORIDE 25 MG: 10 TABLET ORAL at 13:05

## 2024-12-07 RX ADMIN — ACETAMINOPHEN 650 MG: 325 TABLET ORAL at 00:49

## 2024-12-07 RX ADMIN — ASPIRIN 81 MG: 81 TABLET, COATED ORAL at 05:27

## 2024-12-07 RX ADMIN — DOCUSATE SODIUM 100 MG: 100 CAPSULE, LIQUID FILLED ORAL at 10:52

## 2024-12-07 RX ADMIN — ACETAMINOPHEN 1000 MG: 500 TABLET ORAL at 08:24

## 2024-12-07 RX ADMIN — Medication 2000 UNITS: at 08:24

## 2024-12-07 RX ADMIN — ACETAMINOPHEN 1000 MG: 500 TABLET ORAL at 23:20

## 2024-12-07 RX ADMIN — OXYCODONE 5 MG: 5 TABLET ORAL at 19:53

## 2024-12-07 RX ADMIN — BUDESONIDE 500 MCG: 0.5 SUSPENSION RESPIRATORY (INHALATION) at 08:06

## 2024-12-07 RX ADMIN — OXYCODONE 5 MG: 5 TABLET ORAL at 13:02

## 2024-12-07 RX ADMIN — ENOXAPARIN SODIUM 30 MG: 100 INJECTION SUBCUTANEOUS at 08:25

## 2024-12-07 RX ADMIN — SODIUM CHLORIDE, PRESERVATIVE FREE 10 ML: 5 INJECTION INTRAVENOUS at 08:25

## 2024-12-07 RX ADMIN — SODIUM CHLORIDE, PRESERVATIVE FREE 10 ML: 5 INJECTION INTRAVENOUS at 19:53

## 2024-12-07 ASSESSMENT — PAIN DESCRIPTION - DESCRIPTORS
DESCRIPTORS: SQUEEZING;SPASM
DESCRIPTORS: SPASM;SHARP
DESCRIPTORS: JABBING;ACHING;DISCOMFORT

## 2024-12-07 ASSESSMENT — PAIN DESCRIPTION - LOCATION
LOCATION: BACK

## 2024-12-07 ASSESSMENT — PAIN DESCRIPTION - ORIENTATION
ORIENTATION: MID
ORIENTATION: MID
ORIENTATION: MID;LOWER

## 2024-12-07 ASSESSMENT — PAIN SCALES - GENERAL
PAINLEVEL_OUTOF10: 9
PAINLEVEL_OUTOF10: 3
PAINLEVEL_OUTOF10: 7
PAINLEVEL_OUTOF10: 9
PAINLEVEL_OUTOF10: 4
PAINLEVEL_OUTOF10: 6

## 2024-12-07 ASSESSMENT — PAIN DESCRIPTION - FREQUENCY: FREQUENCY: CONTINUOUS

## 2024-12-07 ASSESSMENT — PAIN - FUNCTIONAL ASSESSMENT
PAIN_FUNCTIONAL_ASSESSMENT: PREVENTS OR INTERFERES SOME ACTIVE ACTIVITIES AND ADLS
PAIN_FUNCTIONAL_ASSESSMENT: ACTIVITIES ARE NOT PREVENTED
PAIN_FUNCTIONAL_ASSESSMENT: ACTIVITIES ARE NOT PREVENTED

## 2024-12-07 ASSESSMENT — PAIN DESCRIPTION - PAIN TYPE: TYPE: ACUTE PAIN

## 2024-12-07 ASSESSMENT — PAIN DESCRIPTION - ONSET: ONSET: ON-GOING

## 2024-12-07 NOTE — DISCHARGE INSTRUCTIONS
Orthopedic Instructions:  Activities as tolerated. Avoid deep flexion  Physical Therapy for strengthening, range of motion, and gait training.  Ice (20 minutes on and off 1 hour)  to reduce swelling and throbbing pain.  Wean off narcotics (percocet/norco) as soon as possible. Do not take tylenol if still taking narcotics.  Follow up with Dr. Haynes in his office 7-10 days after injury. Call [phone] to schedule/confirm.

## 2024-12-07 NOTE — CONSULTS
Orthopedic Surgery Consult  Dr. Haynes      CC/Reason for consult:  Thoracic back pain    HPI:      The patient is a 89 y.o. female with thoracic back pain that began last Wednesday.  Patient does not recall any particular falls or injuries.  The pain gradually progressed to the point where she was unable to mobilize.  She was taken to the ED for further evaluation.  CT scan was obtained in the ED and patient was found to have multiple vertebral compression fractures.  Patient's pain is located diffusely throughout the thoracic spine.  He has pain with any movement.  Patient does live at home.  She ambulates with the assistance of a walker.  She is on aspirin.    Past Medical History:    Past Medical History:   Diagnosis Date    Acid reflux disease     Arthritis     Chronic back pain     COPD (chronic obstructive pulmonary disease) ([facility])     Hearing loss     Hyperlipidemia     Irritable bowel     Meningioma (HCC) 05/2018    Migraine     Neuropathy     Osteoarthritis     Osteoporosis     Thyroid disease     hypo    TIA (transient ischemic attack) 05/2018       Past Surgical History:    Past Surgical History:   Procedure Laterality Date    ANKLE SURGERY  1980    pin placement    APPENDECTOMY      BLEPHAROPLASTY      BREAST BIOPSY  1978    BREAST SURGERY      biopsy bilat     CHOLECYSTECTOMY      COLONOSCOPY      ENDOSCOPY, COLON, DIAGNOSTIC  8/2012    ERCP  10/31/2012    ERCP  12/05/2012    stent removal    EYE SURGERY      cataract    FIXATION KYPHOPLASTY  08/03/2016    LUMBAR 2    FRACTURE SURGERY      left ankle, pin placement    HYSTERECTOMY (CERVIX STATUS UNKNOWN)      ND OFFICE/OUTPT VISIT,PROCEDURE ONLY N/A 8/1/2018    KYPHOPLASTY  T11  -- TIME UNDETERMINED performed by Hilario Estrada DO at [facility]    THYROID SURGERY      biopsy    TONSILLECTOMY         Medications Prior to Admission:   Prior to Admission medications    Medication Sig Start Date End Date Taking? Authorizing Provider   lidocaine ( LIDOCAINE

## 2024-12-07 NOTE — PROGRESS NOTES
[facility] Hospitalist Progress Note    Admitting Date and Time: 12/6/2024 11:07 AM  Admit Dx: Intractable back pain [M54.9]    Subjective:  Patient is being followed for Intractable back pain [M54.9]   Patient was examined  ROS: denies fever, chills, cp, sob, n/v, HA unless stated above.      acetaminophen  1,000 mg Oral 3 times per day    docusate sodium  100 mg Oral BID    hydrALAZINE  10 mg Oral BID    sodium chloride flush  5-40 mL IntraVENous 2 times per day    enoxaparin  30 mg SubCUTAneous Daily    aspirin  81 mg Oral QAM AC    budesonide  0.5 mg Nebulization BID RT    vitamin D  2,000 Units Oral QAM    levothyroxine  50 mcg Oral Daily     oxyCODONE, 5 mg, Q4H PRN  fentanNYL, 25 mcg, Q2H PRN  sodium chloride flush, 5-40 mL, PRN  sodium chloride, , PRN  potassium chloride, 40 mEq, PRN   Or  potassium alternative oral replacement, 40 mEq, PRN  magnesium sulfate, 2,000 mg, PRN  ondansetron, 4 mg, Q8H PRN   Or  ondansetron, 4 mg, Q6H PRN  acetaminophen, 650 mg, Q6H PRN   Or  acetaminophen, 650 mg, Q6H PRN  SUMAtriptan, 100 mg, Once PRN         Objective:    BP (!) 172/77   Pulse 76   Temp 98.3 °F (36.8 °C) (Oral)   Resp 16   Ht 1.524 m (5')   Wt 40.9 kg (90 lb 1.6 oz)   SpO2 93%   BMI 17.60 kg/m²     General Appearance: alert and oriented to person, place and time and in no acute distress  Skin: warm and dry  Head: normocephalic and atraumatic  Eyes: pupils equal, round, and reactive to light, extraocular eye movements intact, conjunctivae normal  Neck: neck supple and non tender without mass   Pulmonary/Chest: clear to auscultation bilaterally- no wheezes, rales or rhonchi, normal air movement, no respiratory distress  Cardiovascular: normal rate, normal S1 and S2 and no carotid bruits  Abdomen: soft, non-tender, non-distended, normal bowel sounds, no masses or organomegaly  Extremities: no cyanosis, no clubbing and no edema  Neurologic: no cranial nerve deficit and speech normal        Recent

## 2024-12-08 PROCEDURE — 2580000003 HC RX 258: Performed by: STUDENT IN AN ORGANIZED HEALTH CARE EDUCATION/TRAINING PROGRAM

## 2024-12-08 PROCEDURE — 6370000000 HC RX 637 (ALT 250 FOR IP): Performed by: STUDENT IN AN ORGANIZED HEALTH CARE EDUCATION/TRAINING PROGRAM

## 2024-12-08 PROCEDURE — 94640 AIRWAY INHALATION TREATMENT: CPT

## 2024-12-08 PROCEDURE — 1200000000 HC SEMI PRIVATE

## 2024-12-08 PROCEDURE — 6360000002 HC RX W HCPCS: Performed by: STUDENT IN AN ORGANIZED HEALTH CARE EDUCATION/TRAINING PROGRAM

## 2024-12-08 PROCEDURE — 99233 SBSQ HOSP IP/OBS HIGH 50: CPT | Performed by: STUDENT IN AN ORGANIZED HEALTH CARE EDUCATION/TRAINING PROGRAM

## 2024-12-08 RX ADMIN — BUDESONIDE 500 MCG: 0.5 SUSPENSION RESPIRATORY (INHALATION) at 06:22

## 2024-12-08 RX ADMIN — SODIUM CHLORIDE, PRESERVATIVE FREE 10 ML: 5 INJECTION INTRAVENOUS at 20:13

## 2024-12-08 RX ADMIN — OXYCODONE 5 MG: 5 TABLET ORAL at 23:32

## 2024-12-08 RX ADMIN — BUDESONIDE 500 MCG: 0.5 SUSPENSION RESPIRATORY (INHALATION) at 20:43

## 2024-12-08 RX ADMIN — ACETAMINOPHEN 1000 MG: 500 TABLET ORAL at 08:20

## 2024-12-08 RX ADMIN — LEVOTHYROXINE SODIUM 50 MCG: 0.05 TABLET ORAL at 05:47

## 2024-12-08 RX ADMIN — ASPIRIN 81 MG: 81 TABLET, COATED ORAL at 05:47

## 2024-12-08 RX ADMIN — DOCUSATE SODIUM 100 MG: 100 CAPSULE, LIQUID FILLED ORAL at 08:21

## 2024-12-08 RX ADMIN — Medication 2000 UNITS: at 08:21

## 2024-12-08 RX ADMIN — OXYCODONE 5 MG: 5 TABLET ORAL at 15:02

## 2024-12-08 RX ADMIN — OXYCODONE 5 MG: 5 TABLET ORAL at 05:49

## 2024-12-08 RX ADMIN — HYDRALAZINE HYDROCHLORIDE 10 MG: 10 TABLET ORAL at 20:13

## 2024-12-08 RX ADMIN — HYDRALAZINE HYDROCHLORIDE 10 MG: 10 TABLET ORAL at 08:20

## 2024-12-08 RX ADMIN — ACETAMINOPHEN 1000 MG: 500 TABLET ORAL at 16:26

## 2024-12-08 RX ADMIN — DOCUSATE SODIUM 100 MG: 100 CAPSULE, LIQUID FILLED ORAL at 20:13

## 2024-12-08 RX ADMIN — ACETAMINOPHEN 1000 MG: 500 TABLET ORAL at 23:19

## 2024-12-08 RX ADMIN — ENOXAPARIN SODIUM 30 MG: 100 INJECTION SUBCUTANEOUS at 08:21

## 2024-12-08 RX ADMIN — SODIUM CHLORIDE, PRESERVATIVE FREE 10 ML: 5 INJECTION INTRAVENOUS at 08:21

## 2024-12-08 ASSESSMENT — PAIN DESCRIPTION - LOCATION
LOCATION: BACK

## 2024-12-08 ASSESSMENT — PAIN SCALES - GENERAL
PAINLEVEL_OUTOF10: 10
PAINLEVEL_OUTOF10: 10
PAINLEVEL_OUTOF10: 6
PAINLEVEL_OUTOF10: 5
PAINLEVEL_OUTOF10: 3

## 2024-12-08 ASSESSMENT — PAIN - FUNCTIONAL ASSESSMENT: PAIN_FUNCTIONAL_ASSESSMENT: ACTIVITIES ARE NOT PREVENTED

## 2024-12-08 ASSESSMENT — PAIN DESCRIPTION - ORIENTATION: ORIENTATION: UPPER;LOWER

## 2024-12-08 ASSESSMENT — PAIN DESCRIPTION - DESCRIPTORS: DESCRIPTORS: ACHING;DISCOMFORT

## 2024-12-08 NOTE — PROGRESS NOTES
[facility] Hospitalist Progress Note    Admitting Date and Time: 12/6/2024 11:07 AM  Admit Dx: Intractable back pain [M54.9]    Subjective:  Patient is being followed for Intractable back pain [M54.9]   Patient was examined  ROS: denies fever, chills, cp, sob, n/v, HA unless stated above.      acetaminophen  1,000 mg Oral 3 times per day    docusate sodium  100 mg Oral BID    hydrALAZINE  10 mg Oral BID    sodium chloride flush  5-40 mL IntraVENous 2 times per day    enoxaparin  30 mg SubCUTAneous Daily    aspirin  81 mg Oral QAM AC    budesonide  0.5 mg Nebulization BID RT    vitamin D  2,000 Units Oral QAM    levothyroxine  50 mcg Oral Daily     oxyCODONE, 5 mg, Q4H PRN  fentanNYL, 25 mcg, Q2H PRN  sodium chloride flush, 5-40 mL, PRN  sodium chloride, , PRN  potassium chloride, 40 mEq, PRN   Or  potassium alternative oral replacement, 40 mEq, PRN  magnesium sulfate, 2,000 mg, PRN  ondansetron, 4 mg, Q8H PRN   Or  ondansetron, 4 mg, Q6H PRN  acetaminophen, 650 mg, Q6H PRN   Or  acetaminophen, 650 mg, Q6H PRN         Objective:    /71   Pulse 73   Temp 97.8 °F (36.6 °C) (Oral)   Resp 18   Ht 1.524 m (5')   Wt 41.1 kg (90 lb 8 oz)   SpO2 96%   BMI 17.67 kg/m²     General Appearance: alert and oriented to person, place and time and in no acute distress  Skin: warm and dry  Head: normocephalic and atraumatic  Eyes: pupils equal, round, and reactive to light, extraocular eye movements intact, conjunctivae normal  Neck: neck supple and non tender without mass   Pulmonary/Chest: clear to auscultation bilaterally- no wheezes, rales or rhonchi, normal air movement, no respiratory distress  Cardiovascular: normal rate, normal S1 and S2 and no carotid bruits  Abdomen: soft, non-tender, non-distended, normal bowel sounds, no masses or organomegaly  Extremities: no cyanosis, no clubbing and no edema  Neurologic: no cranial nerve deficit and speech normal        Recent Labs     12/06/24  1128 12/07/24  0140

## 2024-12-08 NOTE — PROGRESS NOTES
Progress Note    Patient:  Anrdeina Gregg  YOB: 1927     89 y.o. female    Subjective:  Patient seen and examined.   No acute issues overnight.   No complaints or concerns.   Pain is controlled.   Patient denies F, HA, CP, SOB, N/V, numbness, or tingling.  Afebrile, VSS    Objective:   Vitals:    12/08/24 0638   BP: 138/71   Pulse: 73   Resp: 18   Temp: 97.8 °F (36.6 °C)   SpO2: 96%     Gen: NAD, cooperative  MSK: Thoracolumbar: TTP bilateral paraspinal musculature.  TTP over spinous process mid thoracic spine.  L2-S1 motor function is intact.  L2-S1 sensation is intact.  Negative babinski.  Negative clonus     Meds:   See rec for complete list    Impression/plan: 89 y.o. female  acute T10 vertebral compression fx     -MRI was reviewed with the patient and daughter.  Patient does have an acute T10 vertebral compression fracture.  We discussed treatment options.  At this time the daughter would like to think about whether or not she wants to undergo surgical intervention.    - AAT. Avoid forward flexion  - Pain control: IV and PO, wean IV  - Ice (20 minutes on and off 1 hour)  for swelling/pain  - PT/OT to evaluate for mobilization  - Please page ortho with any questions    Buddy Haynes DO,  9:36 AM 12/8/2024

## 2024-12-09 VITALS
DIASTOLIC BLOOD PRESSURE: 64 MMHG | TEMPERATURE: 97.6 F | HEIGHT: 60 IN | SYSTOLIC BLOOD PRESSURE: 133 MMHG | WEIGHT: 91.05 LBS | OXYGEN SATURATION: 96 % | RESPIRATION RATE: 16 BRPM | HEART RATE: 77 BPM | BODY MASS INDEX: 17.88 KG/M2

## 2024-12-09 PROCEDURE — 2580000003 HC RX 258: Performed by: STUDENT IN AN ORGANIZED HEALTH CARE EDUCATION/TRAINING PROGRAM

## 2024-12-09 PROCEDURE — 6370000000 HC RX 637 (ALT 250 FOR IP): Performed by: STUDENT IN AN ORGANIZED HEALTH CARE EDUCATION/TRAINING PROGRAM

## 2024-12-09 PROCEDURE — 99239 HOSP IP/OBS DSCHRG MGMT >30: CPT | Performed by: STUDENT IN AN ORGANIZED HEALTH CARE EDUCATION/TRAINING PROGRAM

## 2024-12-09 PROCEDURE — 6360000002 HC RX W HCPCS: Performed by: STUDENT IN AN ORGANIZED HEALTH CARE EDUCATION/TRAINING PROGRAM

## 2024-12-09 PROCEDURE — 94640 AIRWAY INHALATION TREATMENT: CPT

## 2024-12-09 RX ORDER — OXYCODONE HYDROCHLORIDE 5 MG/1
5 TABLET ORAL EVERY 6 HOURS PRN
Qty: 20 TABLET | Refills: 0 | Status: SHIPPED | OUTPATIENT
Start: 2024-12-09 | End: 2024-12-14

## 2024-12-09 RX ADMIN — ENOXAPARIN SODIUM 30 MG: 100 INJECTION SUBCUTANEOUS at 08:34

## 2024-12-09 RX ADMIN — LEVOTHYROXINE SODIUM 50 MCG: 0.05 TABLET ORAL at 06:04

## 2024-12-09 RX ADMIN — ACETAMINOPHEN 1000 MG: 500 TABLET ORAL at 08:34

## 2024-12-09 RX ADMIN — SODIUM CHLORIDE, PRESERVATIVE FREE 10 ML: 5 INJECTION INTRAVENOUS at 08:36

## 2024-12-09 RX ADMIN — BUDESONIDE 500 MCG: 0.5 SUSPENSION RESPIRATORY (INHALATION) at 08:55

## 2024-12-09 RX ADMIN — HYDRALAZINE HYDROCHLORIDE 10 MG: 10 TABLET ORAL at 08:34

## 2024-12-09 RX ADMIN — ASPIRIN 81 MG: 81 TABLET, COATED ORAL at 06:04

## 2024-12-09 RX ADMIN — Medication 2000 UNITS: at 08:35

## 2024-12-09 RX ADMIN — OXYCODONE 5 MG: 5 TABLET ORAL at 06:04

## 2024-12-09 RX ADMIN — DOCUSATE SODIUM 100 MG: 100 CAPSULE, LIQUID FILLED ORAL at 08:34

## 2024-12-09 ASSESSMENT — PAIN SCALES - GENERAL: PAINLEVEL_OUTOF10: 8

## 2024-12-09 ASSESSMENT — PAIN DESCRIPTION - DESCRIPTORS: DESCRIPTORS: JABBING

## 2024-12-09 ASSESSMENT — PAIN DESCRIPTION - LOCATION
LOCATION: BACK
LOCATION: BACK

## 2024-12-09 NOTE — CARE COORDINATION
Introduced my self and provided explanation of CM role to patient. Patient is awake, alert, and aware of current diagnosis and discharge planning. Patient is from home alone. Patient uses a walker at home. PCP is Dr. Nath. Preferred pharmacy is [facility]. Patient states her discharge plan is home with [facility]. HHC list provided. Patient and daughter also provided with a private duty list. Await HHC choices.HHC order placed. Family to provide transport home at time of discharge.   Electronically signed by Christina Walls RN on 12/9/2024 at 10:05 AM    UPDATE: Patient choosing [facility]. Referral made to [facility]. [facility] accepted. Patient wanting BSC. DME ordered, [facility] notified of order.   Electronically signed by Christina Walls RN on 12/9/2024 at 1:04 PM

## 2024-12-09 NOTE — PATIENT CARE CONFERENCE
P Quality Flow/Interdisciplinary Rounds Progress Note        Quality Flow Rounds held on December 9, 2024    Disciplines Attending:  Bedside Nurse, , , and Nursing Unit Leadership    Andreina Gregg was admitted on 12/6/2024 11:07 AM    Anticipated Discharge Date:       Disposition:    Freeman Score:  Freeman Scale Score: 19    Readmission Risk              Risk of Unplanned Readmission:  10           Discussed patient goal for the day, patient clinical progression, and barriers to discharge.  The following Goal(s) of the Day/Commitment(s) have been identified:  dc plan      Margarita Ruggiero RN  December 9, 2024

## 2024-12-09 NOTE — DISCHARGE SUMMARY
[facility] Hospitalist Physician Discharge Summary       No follow-up provider specified.    Activity level: As tolerated     Dispo:       Condition on discharge: Stable home     Patient ID:  Andreina Gregg  [id number]  89 y.o.  12/22/1927    Admit date: 12/6/2024    Discharge date and time:  12/9/2024  11:06 AM    Admission Diagnoses: Principal Problem:    Intractable back pain  Resolved Problems:    * No resolved hospital problems. *      Discharge Diagnoses: Principal Problem:    Intractable back pain  Resolved Problems:    * No resolved hospital problems. *      Consults:  IP CONSULT TO HOSPITALIST  IP CONSULT TO ORTHOPEDIC SURGERY  IP CONSULT TO HOME CARE NEEDS    Procedures:     Hospital Course:       89-year-old female with chronic back pain back surgery in the past presenting with acute on chronic back pain patient was found to have acute T10 compression fracture orthopedic surgery consulted they recommend medical management follow-up outpatient.  Medically stable discharge today  Discharge Exam:    General Appearance: alert and oriented to person, place and time and in no acute distress  Skin: warm and dry  Head: normocephalic and atraumatic  Eyes: pupils equal, round, and reactive to light, extraocular eye movements intact, conjunctivae normal  Neck: neck supple and non tender without mass   Pulmonary/Chest: clear to auscultation bilaterally- no wheezes, rales or rhonchi, normal air movement, no respiratory distress  Cardiovascular: normal rate, normal S1 and S2 and no carotid bruits  Abdomen: soft, non-tender, non-distended, normal bowel sounds, no masses or organomegaly  Extremities: no cyanosis, no clubbing and no edema  Neurologic: no cranial nerve deficit and speech normal    I/O last 3 completed shifts:  In: 180 [P.O.:180]  Out: 5 [Urine:5]  I/O this shift:  In: 180 [P.O.:180]  Out: -       LABS:  Recent Labs     12/06/24  1128 12/07/24  0140    134   K 4.5 4.5   CL 98 102   CO2 25 21*

## 2024-12-09 NOTE — PROGRESS NOTES
Occupational Therapy  OT consult received to eval/treat and chart review complete. Patient reports she is functioning at baseline level of independent. Patient reports she has been up independently since admission and has no concerns related to functional abilities. Patient adamantly declines need for OT evaluation at this time. Patient's daughter present, reports concerns about showering and toileting during night. Patient's daughter reporting she will use shower chair or take patient across the hallway to her walk in shower, was receptive to BSC, case management notified. No OT evaluation complete per patient preference.      Haydee Owens OTR/L  License Number: [id number]

## 2024-12-09 NOTE — ACP (ADVANCE CARE PLANNING)
Advance Care Planning   Healthcare Decision Maker:    Primary Decision Maker: Olga Lopes [facility] - [phone]    Click here to complete Healthcare Decision Makers including selection of the Healthcare Decision Maker Relationship (ie \"Primary\").

## 2024-12-09 NOTE — PROGRESS NOTES
Physical Therapy  Facility/Department: [address] MED SURG    Name: Andreina Gregg  : 1927  MRN: [id number]  Date of Service: 2024    PT endy was attempted this am and pt refused PT services.  She states she knows exactly what PT does.  She reports that PT is going to see how she walks in the [place] to help determine if she can go home or has any needs.  She states she is able to walk and is going home today and does not need PT at this time.  Will discharge pt from our service at this time per her request.    Ady Carl Jr., P.T.  License Number: [id number]

## 2024-12-09 NOTE — PROGRESS NOTES
CLINICAL PHARMACY NOTE: MEDS TO BEDS    Total # of Prescriptions Filled: 1   The following medications were delivered to the patient:  Oxy 5    Additional Documentation:

## 2024-12-09 NOTE — PLAN OF CARE
Problem: ABCDS Injury Assessment  Goal: Absence of physical injury  12/6/2024 2159 by Leilani Simmons  Outcome: Progressing  12/6/2024 1935 by Marie Pike RN  Outcome: Progressing     Problem: Discharge Planning  Goal: Discharge to home or other facility with appropriate resources  12/6/2024 2159 by Leilani Simmons  Outcome: Progressing  12/6/2024 1935 by Marie Pike RN  Outcome: Progressing     Problem: Chronic Conditions and Co-morbidities  Goal: Patient's chronic conditions and co-morbidity symptoms are monitored and maintained or improved  12/6/2024 2159 by Leilani Simmons  Outcome: Progressing  12/6/2024 1935 by Marie Pike RN  Outcome: Progressing     Problem: Pain  Goal: Verbalizes/displays adequate comfort level or baseline comfort level  12/6/2024 2159 by Leilani Simmons  Outcome: Progressing  12/6/2024 1935 by Marie Pike RN  Outcome: Progressing     Problem: Safety - Adult  Goal: Free from fall injury  12/6/2024 2159 by Leilani Simmons  Outcome: Progressing  12/6/2024 1935 by Marie Pike RN  Outcome: Progressing     Problem: Musculoskeletal - Adult  Goal: Return mobility to safest level of function  12/6/2024 2159 by Leilani Simmons  Outcome: Progressing  12/6/2024 1935 by Marie Pike RN  Outcome: Progressing  Goal: Maintain proper alignment of affected body part  12/6/2024 2159 by Leilani Simmons  Outcome: Progressing  12/6/2024 1935 by Marie Pike RN  Outcome: Progressing  Goal: Return ADL status to a safe level of function  12/6/2024 2159 by Leilani Simmons  Outcome: Progressing  12/6/2024 1935 by Marie Pike RN  Outcome: Progressing     Problem: Genitourinary - Adult  Goal: Absence of urinary retention  12/6/2024 2159 by Leilani Simmons  Outcome: Progressing  12/6/2024 1935 by Marie Pike RN  Outcome: Progressing  Goal: Urinary catheter remains patent  12/6/2024 2159 by Leilani Simmons  Outcome: Progressing  12/6/2024 1935 by Marie Pike RN  Outcome: Progressing     
  Problem: ABCDS Injury Assessment  Goal: Absence of physical injury  12/7/2024 1105 by Arlet Edward RN  Outcome: Not Progressing     Problem: Discharge Planning  Goal: Discharge to home or other facility with appropriate resources  12/7/2024 1105 by Arlet Edward RN  Outcome: Not Progressing     Problem: Chronic Conditions and Co-morbidities  Goal: Patient's chronic conditions and co-morbidity symptoms are monitored and maintained or improved  12/7/2024 1105 by Arlet Edward RN  Outcome: Not Progressing     Problem: Pain  Goal: Verbalizes/displays adequate comfort level or baseline comfort level  12/7/2024 1105 by Arlet Edward RN  Outcome: Not Progressing     Problem: Safety - Adult  Goal: Free from fall injury  12/7/2024 1105 by Arlet Edward RN  Outcome: Not Progressing     Problem: Musculoskeletal - Adult  Goal: Return mobility to safest level of function  12/7/2024 1105 by Arlet Edward RN  Outcome: Not Progressing     Problem: Musculoskeletal - Adult  Goal: Maintain proper alignment of affected body part  12/7/2024 1105 by Arlet Edward RN  Outcome: Not Progressing     Problem: Musculoskeletal - Adult  Goal: Return ADL status to a safe level of function  12/7/2024 1105 by Arlet Edward RN  Outcome: Not Progressing     Problem: Genitourinary - Adult  Goal: Absence of urinary retention  12/7/2024 1105 by Arlet Edward RN  Outcome: Not Progressing     Problem: Genitourinary - Adult  Goal: Urinary catheter remains patent  12/7/2024 1105 by Arlet Edward RN  Outcome: Not Progressing     Problem: ABCDS Injury Assessment  Goal: Absence of physical injury  12/7/2024 1105 by Arlet Edward RN  Outcome: Not Progressing  12/6/2024 2159 by Leilani Simmons  Outcome: Progressing     Problem: Discharge Planning  Goal: Discharge to home or other facility with appropriate resources  12/7/2024 1105 by Arlet Edward RN  Outcome: Not Progressing  12/6/2024 2159 by Leilani Simmons  Outcome: Progressing   
  Problem: ABCDS Injury Assessment  Goal: Absence of physical injury  12/8/2024 2052 by Katlin Dudley, RN  Outcome: Progressing  12/8/2024 0851 by Susy Mackay, RN  Outcome: Progressing     Problem: Discharge Planning  Goal: Discharge to home or other facility with appropriate resources  Outcome: Progressing     Problem: Chronic Conditions and Co-morbidities  Goal: Patient's chronic conditions and co-morbidity symptoms are monitored and maintained or improved  Outcome: Progressing     Problem: Pain  Goal: Verbalizes/displays adequate comfort level or baseline comfort level  Outcome: Progressing     Problem: Safety - Adult  Goal: Free from fall injury  Outcome: Progressing     Problem: Musculoskeletal - Adult  Goal: Return mobility to safest level of function  Outcome: Progressing  Goal: Maintain proper alignment of affected body part  Outcome: Progressing  Goal: Return ADL status to a safe level of function  Outcome: Progressing     Problem: Genitourinary - Adult  Goal: Absence of urinary retention  Outcome: Progressing  Goal: Urinary catheter remains patent  Outcome: Progressing     
  Problem: ABCDS Injury Assessment  Goal: Absence of physical injury  12/9/2024 0949 by Marie Pike RN  Outcome: Progressing  12/8/2024 2052 by Katlin Dudley RN  Outcome: Progressing     Problem: Discharge Planning  Goal: Discharge to home or other facility with appropriate resources  12/9/2024 0949 by Marie Pike RN  Outcome: Progressing  12/8/2024 2052 by Katlin Dudley RN  Outcome: Progressing     Problem: Chronic Conditions and Co-morbidities  Goal: Patient's chronic conditions and co-morbidity symptoms are monitored and maintained or improved  12/9/2024 0949 by Marie Pike RN  Outcome: Progressing  12/8/2024 2052 by Katlin Dudley RN  Outcome: Progressing     Problem: Pain  Goal: Verbalizes/displays adequate comfort level or baseline comfort level  12/9/2024 0949 by Marie Pike RN  Outcome: Progressing  12/8/2024 2052 by Katlin Dudley RN  Outcome: Progressing     Problem: Safety - Adult  Goal: Free from fall injury  12/9/2024 0949 by Marie Pike RN  Outcome: Progressing  12/8/2024 2052 by Katlin Dudley RN  Outcome: Progressing     Problem: Musculoskeletal - Adult  Goal: Return mobility to safest level of function  12/9/2024 0949 by Marie Pike RN  Outcome: Progressing  12/8/2024 2052 by Katlin Dudley RN  Outcome: Progressing  Goal: Maintain proper alignment of affected body part  12/9/2024 0949 by Marie Pike RN  Outcome: Progressing  12/8/2024 2052 by Katlin Dudley RN  Outcome: Progressing  Goal: Return ADL status to a safe level of function  12/9/2024 0949 by Marie Pike RN  Outcome: Progressing  12/8/2024 2052 by Katlin Dudley RN  Outcome: Progressing     Problem: Genitourinary - Adult  Goal: Absence of urinary retention  12/9/2024 0949 by Marie Pike RN  Outcome: Progressing  12/8/2024 2052 by Katlin Dudley RN  Outcome: Progressing  Goal: Urinary catheter remains patent  12/9/2024 0949 by Pike, Marie, RN  Outcome: Progressing  12/8/2024 2052 by Katlin Dudley, 
  Problem: ABCDS Injury Assessment  Goal: Absence of physical injury  12/9/2024 1227 by Marie Pike RN  Outcome: Adequate for Discharge  12/9/2024 0949 by Marie Pike RN  Outcome: Progressing     Problem: Discharge Planning  Goal: Discharge to home or other facility with appropriate resources  12/9/2024 1227 by Marie Pike RN  Outcome: Adequate for Discharge  12/9/2024 0949 by Marie Pike RN  Outcome: Progressing     Problem: Chronic Conditions and Co-morbidities  Goal: Patient's chronic conditions and co-morbidity symptoms are monitored and maintained or improved  12/9/2024 1227 by Marie Pike RN  Outcome: Adequate for Discharge  12/9/2024 0949 by Marie Pike RN  Outcome: Progressing     Problem: Pain  Goal: Verbalizes/displays adequate comfort level or baseline comfort level  12/9/2024 1227 by Marie Pike RN  Outcome: Adequate for Discharge  12/9/2024 0949 by Marie Pike RN  Outcome: Progressing     Problem: Safety - Adult  Goal: Free from fall injury  12/9/2024 1227 by Mraie Pike RN  Outcome: Adequate for Discharge  12/9/2024 0949 by Marie Pike RN  Outcome: Progressing     Problem: Musculoskeletal - Adult  Goal: Return mobility to safest level of function  12/9/2024 1227 by Mraie Pike RN  Outcome: Adequate for Discharge  12/9/2024 0949 by Marie Pike RN  Outcome: Progressing  Goal: Maintain proper alignment of affected body part  12/9/2024 1227 by Marie Pike RN  Outcome: Adequate for Discharge  12/9/2024 0949 by Marie Pike RN  Outcome: Progressing  Goal: Return ADL status to a safe level of function  12/9/2024 1227 by Marie Pike RN  Outcome: Adequate for Discharge  12/9/2024 0949 by Marie Pike RN  Outcome: Progressing     Problem: Genitourinary - Adult  Goal: Absence of urinary retention  12/9/2024 1227 by Marie Pike RN  Outcome: Adequate for Discharge  12/9/2024 0949 by Marie Pike RN  Outcome: Progressing  Goal: Urinary catheter remains 
  Problem: ABCDS Injury Assessment  Goal: Absence of physical injury  Outcome: Progressing     Problem: Discharge Planning  Goal: Discharge to home or other facility with appropriate resources  Outcome: Progressing     Problem: Chronic Conditions and Co-morbidities  Goal: Patient's chronic conditions and co-morbidity symptoms are monitored and maintained or improved  Outcome: Progressing     Problem: Pain  Goal: Verbalizes/displays adequate comfort level or baseline comfort level  Outcome: Progressing     Problem: Safety - Adult  Goal: Free from fall injury  Outcome: Progressing     Problem: Musculoskeletal - Adult  Goal: Return mobility to safest level of function  Outcome: Progressing  Goal: Maintain proper alignment of affected body part  Outcome: Progressing  Goal: Return ADL status to a safe level of function  Outcome: Progressing     Problem: Genitourinary - Adult  Goal: Absence of urinary retention  Outcome: Progressing  Goal: Urinary catheter remains patent  Outcome: Progressing     
Urinary catheter remains patent  12/7/2024 2026 by Katlin Dudley, RN  Outcome: Progressing  12/7/2024 1105 by Arlet Edward, RN  Outcome: Not Progressing

## 2025-04-10 NOTE — PROGRESS NOTES
Recommendations  1. Continue regular diet as tolerated.       2. Ensure Enlive ONS or alternative TID.      3. Rec'd Moshe BID to promote wound healing and to provide additional nutrtiion.     4. Rec'd Beneprotein TID.  5. RD to monitor and follow up.  Goals:   1. Meet % EEN/EPN by RD follow up.   2. Maintain weight during admission.  Nutrition Goal Status: progressing towards goal   in bed per her request as she was up in chair this am for 1.5 hours. She was left with call light left by patient. Chair/bed alarm: bed alarm was re-activated. Pt's/ family goals   1. To heal so she can go home. Patient and or family understand(s) diagnosis, prognosis, and plan of care. PLAN:    PT care will be provided in accordance with the objectives noted above. Exercises and functional mobility practice will be used as well as appropriate assistive devices or modalities to obtain goals. Patient and family education will also be administered as needed. Frequency of treatments: 2-5x/week x 1-2 weeks. Time in  13:00  Time out  13:15    Total Treatment Time  15 minutes     Evaluation Time includes thorough review of current medical information, gathering information on past medical history/social history and prior level of function, completion of standardized testing/informal observation of tasks, assessment of data and education on plan of care and goals. CPT codes:  [x] Low Complexity PT evaluation 00198  [] Moderate Complexity PT evaluation 68810  [] High Complexity PT evaluation 08051  [] PT Re-evaluation 06716  [] Gait training 59829 ** minutes  [] Manual therapy 31875 ** minutes  [] Therapeutic activities 89649 ** minutes  [] Therapeutic exercises 39869 ** minutes  [] Neuromuscular reeducation 87094 ** minutes     Ady Wing., P.T.   License Number: [id number]

## (undated) DEVICE — GAUZE,SPONGE,4"X4",16PLY,XRAY,STRL,LF: Brand: MEDLINE

## (undated) DEVICE — DRAPE C ARM W41XL74IN UNIV MOB W RUBBERBAND CLP

## (undated) DEVICE — TOWEL,OR,DSP,ST,BLUE,STD,6/PK,12PK/CS: Brand: MEDLINE

## (undated) DEVICE — NDLCTR: MAG/MAG 30CT 168/CS: Brand: MEDICAL ACTION INDUSTRIES

## (undated) DEVICE — BONE FILLER DEVICE F04B SIZE 3

## (undated) DEVICE — MARKER,SKIN,WI/RULER AND LABELS: Brand: MEDLINE

## (undated) DEVICE — PACK,LAPAROTOMY,NO GOWNS: Brand: MEDLINE

## (undated) DEVICE — ALCOHOL RUBBING ISO 16OZ 70%

## (undated) DEVICE — SYRINGE A08A KYPHX XPANDER INFLATION: Brand: KYPHON®  INFLATION SYRINGE

## (undated) DEVICE — SYRINGE MED 10ML LUERLOCK TIP W/O SFTY DISP

## (undated) DEVICE — GOWN,BREATHABLE SLV,AURORA,XLG,STRL: Brand: MEDLINE

## (undated) DEVICE — MIXER A07A CEMENT

## (undated) DEVICE — NEEDLE SPNL GRN 18GAX3 1/2IN

## (undated) DEVICE — CURITY FLEXIBLE ADHESIVE BANDAGE: Brand: CURITY

## (undated) DEVICE — SET EXTN L14IN 1ML IV L BOR NO FLTR NO STPCOCK

## (undated) DEVICE — Device

## (undated) DEVICE — COVER,TABLE,60X90,STERILE: Brand: MEDLINE

## (undated) DEVICE — 1000 S-DRAPE TOWEL DRAPE 10/BX: Brand: STERI-DRAPE™

## (undated) DEVICE — TRAP,MUCUS SPECIMEN,40CC: Brand: MEDLINE

## (undated) DEVICE — DOUBLE BASIN SET: Brand: MEDLINE INDUSTRIES, INC.

## (undated) DEVICE — DRAPE THER FLUID WARMING 66X44 IN FLAT SLUSH DBL DISC ORS

## (undated) DEVICE — INTRODUCER T15K ONE STEP OID BEVEL: Brand: KYPHON® ONE-STEP™ OSTEO INTRODUCER™ SYSTEM

## (undated) DEVICE — APPLICATOR PREP 6ML 0.7% IOD POVACRYLEX 74% ISO ALC

## (undated) DEVICE — DEVICE BIOP KYPHOPLASTY SZ 3

## (undated) DEVICE — TAMP K09A XPAN INFLAT BONE  SIZE 15/3-RB: Brand: KYPHON XPANDER™ INFLATABLE BONE TAMP

## (undated) DEVICE — SHEET,DRAPE,53X77,STERILE: Brand: MEDLINE

## (undated) DEVICE — INTENDED FOR TISSUE SEPARATION, AND OTHER PROCEDURES THAT REQUIRE A SHARP SURGICAL BLADE TO PUNCTURE OR CUT.: Brand: BARD-PARKER ® STAINLESS STEEL BLADES

## (undated) DEVICE — STRIP,CLOSURE,WOUND,MEDI-STRIP,1/2X4: Brand: MEDLINE

## (undated) DEVICE — 3M™ STERI-STRIP™ COMPOUND BENZOIN TINCTURE 40 BAGS/CARTON 4 CARTONS/CASE C1544: Brand: 3M™ STERI-STRIP™